# Patient Record
Sex: MALE | Race: WHITE | Employment: UNEMPLOYED | URBAN - METROPOLITAN AREA
[De-identification: names, ages, dates, MRNs, and addresses within clinical notes are randomized per-mention and may not be internally consistent; named-entity substitution may affect disease eponyms.]

---

## 2017-08-15 ENCOUNTER — OFFICE VISIT (OUTPATIENT)
Dept: SURGERY | Age: 37
End: 2017-08-15

## 2017-08-15 VITALS
WEIGHT: 238 LBS | SYSTOLIC BLOOD PRESSURE: 107 MMHG | BODY MASS INDEX: 32.23 KG/M2 | HEART RATE: 58 BPM | DIASTOLIC BLOOD PRESSURE: 71 MMHG | HEIGHT: 72 IN

## 2017-08-15 DIAGNOSIS — E89.0 POSTOPERATIVE HYPOTHYROIDISM: ICD-10-CM

## 2017-08-15 DIAGNOSIS — C73 THYROID CANCER (HCC): ICD-10-CM

## 2017-08-15 PROBLEM — E03.9 HYPOTHYROIDISM: Status: ACTIVE | Noted: 2017-08-15

## 2017-08-15 PROCEDURE — 99203 OFFICE O/P NEW LOW 30 MIN: CPT | Performed by: INTERNAL MEDICINE

## 2017-08-15 RX ORDER — CALCIUM CARBONATE 500(1250)
500 TABLET ORAL DAILY
COMMUNITY
End: 2018-01-29 | Stop reason: SDUPTHER

## 2017-08-15 RX ORDER — LEVOTHYROXINE SODIUM 0.2 MG/1
200 TABLET ORAL DAILY
COMMUNITY
End: 2017-08-15 | Stop reason: SDUPTHER

## 2017-08-15 RX ORDER — CHOLECALCIFEROL (VITAMIN D3) 125 MCG
CAPSULE ORAL
COMMUNITY
End: 2022-05-26 | Stop reason: ALTCHOICE

## 2017-08-15 RX ORDER — GABAPENTIN 300 MG/1
300 CAPSULE ORAL 3 TIMES DAILY
Status: ON HOLD | COMMUNITY
End: 2021-08-26 | Stop reason: HOSPADM

## 2017-08-15 RX ORDER — OXYCODONE HYDROCHLORIDE AND ACETAMINOPHEN 5; 325 MG/1; MG/1
1 TABLET ORAL EVERY 4 HOURS PRN
COMMUNITY
End: 2017-09-12 | Stop reason: ALTCHOICE

## 2017-08-15 RX ORDER — LEVOTHYROXINE SODIUM 0.2 MG/1
200 TABLET ORAL DAILY
Qty: 30 TABLET | Refills: 5 | Status: SHIPPED | OUTPATIENT
Start: 2017-08-15 | End: 2017-09-12 | Stop reason: SDUPTHER

## 2017-08-15 ASSESSMENT — ENCOUNTER SYMPTOMS: TROUBLE SWALLOWING: 0

## 2017-09-12 ENCOUNTER — OFFICE VISIT (OUTPATIENT)
Dept: SURGERY | Age: 37
End: 2017-09-12

## 2017-09-12 VITALS
WEIGHT: 230 LBS | SYSTOLIC BLOOD PRESSURE: 136 MMHG | BODY MASS INDEX: 31.15 KG/M2 | DIASTOLIC BLOOD PRESSURE: 82 MMHG | HEIGHT: 72 IN | HEART RATE: 74 BPM

## 2017-09-12 DIAGNOSIS — C73 PAPILLARY THYROID CARCINOMA (HCC): ICD-10-CM

## 2017-09-12 DIAGNOSIS — E89.0 POSTOPERATIVE HYPOTHYROIDISM: Primary | ICD-10-CM

## 2017-09-12 PROCEDURE — 99213 OFFICE O/P EST LOW 20 MIN: CPT | Performed by: INTERNAL MEDICINE

## 2017-09-12 RX ORDER — LEVOTHYROXINE SODIUM 0.2 MG/1
200 TABLET ORAL DAILY
Qty: 30 TABLET | Refills: 5 | Status: SHIPPED | OUTPATIENT
Start: 2017-09-12 | End: 2017-10-31 | Stop reason: ALTCHOICE

## 2017-09-16 LAB — THYROGLOBULIN BY LC-MS/MS, SERUM/PLASMA: <0.5 NG/ML (ref 1.3–31.8)

## 2017-09-28 ENCOUNTER — HOSPITAL ENCOUNTER (OUTPATIENT)
Dept: ULTRASOUND IMAGING | Age: 37
Discharge: HOME | End: 2017-09-28
Payer: COMMERCIAL

## 2017-09-28 DIAGNOSIS — C73 PAPILLARY THYROID CARCINOMA (HCC): ICD-10-CM

## 2017-09-28 PROCEDURE — 76536 US EXAM OF HEAD AND NECK: CPT

## 2017-10-03 ENCOUNTER — OFFICE VISIT (OUTPATIENT)
Dept: SURGERY | Age: 37
End: 2017-10-03

## 2017-10-03 VITALS
WEIGHT: 231 LBS | HEART RATE: 60 BPM | SYSTOLIC BLOOD PRESSURE: 130 MMHG | HEIGHT: 72 IN | DIASTOLIC BLOOD PRESSURE: 80 MMHG | BODY MASS INDEX: 31.29 KG/M2

## 2017-10-03 DIAGNOSIS — C73 PAPILLARY THYROID CARCINOMA (HCC): ICD-10-CM

## 2017-10-03 DIAGNOSIS — E89.0 POSTOPERATIVE HYPOTHYROIDISM: Primary | ICD-10-CM

## 2017-10-03 PROCEDURE — 99213 OFFICE O/P EST LOW 20 MIN: CPT | Performed by: INTERNAL MEDICINE

## 2017-10-03 NOTE — PROGRESS NOTES
Subjective:      Patient ID: Byron Rondon is a 42 y.o. male. Other   This is a chronic (hypothyroidism/thyroid cancer) problem. The problem occurs intermittently. The problem has been waxing and waning. Associated symptoms include fatigue. Exacerbated by: THYROIDECTOMY. Treatments tried: Synthroid 200 µg daily. The treatment provided moderate relief. Patient Active Problem List   Diagnosis    Thyroid cancer ([facility] [address]    Hypothyroidism         Past Surgical History:   Procedure Laterality Date    TOTAL THYROIDECTOMY Bilateral 2017         No Known Allergies      Current Outpatient Prescriptions:     levothyroxine (SYNTHROID) 200 MCG tablet, Take 1 tablet by mouth Daily, Disp: 30 tablet, Rfl: 5    gabapentin (NEURONTIN) 300 MG capsule, Take 300 mg by mouth 3 times daily, Disp: , Rfl:     calcium carbonate (OSCAL) 500 MG TABS tablet, Take 500 mg by mouth daily, Disp: , Rfl:     Cholecalciferol (VITAMIN D3) 2000 units TABS, Take by mouth, Disp: , Rfl:       Review of Systems   Constitutional: Positive for fatigue. All other systems reviewed and are negative. Vitals:    10/03/17 1144   BP: 130/80   Site: Left Arm   Position: Sitting   Cuff Size: Large Adult   Pulse: 60   Weight: 231 lb (104.8 kg)   Height: 6' (1.829 m)       Objective:   Physical Exam   Constitutional: He appears well-developed and well-nourished. HENT:   Head: Normocephalic and atraumatic. Eyes: Conjunctivae are normal.   Neck: Neck supple. Cardiovascular: Normal rate. Abdominal:   Obese    Neurological: He is alert. Skin: Skin is warm. EXAMINATION: US HEAD NECK SOFT TISSUE THYROID       CLINICAL HISTORY: 68-year-old with history of papillary thyroid carcinoma status post total thyroidectomy and lymph node resection.       COMPARISONS: No previous thyroid studies at this institution       FINDINGS: Thyroid ultrasonography was performed. No visualized thyroid tissue is demonstrated by ultrasound in the thyroid bed.  There are however bilateral nonspecific lymph nodes. There are 3 small lymph nodes in the right supraclavicular region- the    largest measuring 8 mm. Additionally there is a 1.4 x 0.6 cm lymph node at the right angle of the mandible and three lymph nodes just posterior to the angle of the mandible- the largest measuring 2 x 0.5 x 1.9 cm. There is a small less than 1 cm lymph    node just lateral to the mid internal jugular vein. Additionally there are three lymph nodes posterior to the angle of the mandible on the left the largest measuring 2 x 1.5 x 0.5 cm.           Impression   NO ULTRASOUND SIGNS TO CONFIRM THYROID TISSUE IN THE THYROID BED. NONSPECIFIC BILATERAL NECK LYMPH NODES AS DETAILED ABOVE. Results for Geno Turner (MRN [id number]) as of 10/11/2017 07:27   Ref. Range 9/12/2017 12:28   Thyroglobulin by LC-MS/MS, Serum/Plasma Latest Ref Range: 1.3 - 31.8 ng/mL <0.5 (L)     Assessment:      1. Postoperative hypothyroidism     2. Papillary thyroid carcinoma ([facility] [address]             Plan:       Will schedule pt to have neck and whole body scan with iodine 131   F/u in 4 weeks   Pt to hold of synthroid till scan is completed

## 2017-10-03 NOTE — MR AVS SNAPSHOT
After Visit Summary             Byron Rondon   10/3/2017 11:45 AM   Office Visit    Description:  Male : 1980   Provider:  Mary Carmen Jenkins MD   Department:   [address] and Future Appointments         Below is a list of your follow-up and future appointments. This may not be a complete list as you may have made appointments directly with providers that we are not aware of or your providers may have made some for you. Please call your providers to confirm appointments. It is important to keep your appointments. Please bring your current insurance card, photo ID, co-pay, and all medication bottles to your appointment. If self-pay, payment is expected at the time of service. Your To-Do List     Future Appointments Provider Department Dept Phone    10/31/2017 11:45 AM Mary Carmen Jenkins MD [address] [place] [address] [phone]    Please arrive 15 minutes prior to appointment, bring photo ID and insurance card. Please arrive 15 minutes prior to your appointment. Information from Your Visit        Department     Name Address Phone Fax    [facility] [address] [place] [phone]      Vital Signs     Blood Pressure Pulse Height Weight Body Mass Index Smoking Status    130/80 (Site: Left Arm, Position: Sitting, Cuff Size: Large Adult) 60 6' (1.829 m) 231 lb (104.8 kg) 31.33 kg/m2 Current Every Day Smoker      Additional Information about your Body Mass Index (BMI)           Your BMI as listed above is considered obese (30 or more). BMI is an estimate of body fat, calculated from your height and weight. The higher your BMI, the greater your risk of heart disease, high blood pressure, type 2 diabetes, stroke, gallstones, arthritis, sleep apnea, and certain cancers. BMI is not perfect.   It may overestimate body fat in athletes and people who are more muscular. Even a small weight loss (between 5 and 10 percent of your current weight) by decreasing your calorie intake and becoming more physically active will help lower your risk of developing or worsening diseases associated with obesity. Learn more at: [employer]             Medications and Orders      Your Current Medications Are              levothyroxine (SYNTHROID) 200 MCG tablet Take 1 tablet by mouth Daily    gabapentin (NEURONTIN) 300 MG capsule Take 300 mg by mouth 3 times daily    calcium carbonate (OSCAL) 500 MG TABS tablet Take 500 mg by mouth daily    Cholecalciferol (VITAMIN D3) 2000 units TABS Take by mouth      Allergies           No Known Allergies         Additional Information        Basic Information     Date Of Birth Sex Race Ethnicity Preferred Language    1980 Male White Non-/Non  English      Problem List as of 10/3/2017  Date Reviewed: 8/15/2017                Thyroid cancer [facility])    Hypothyroidism      Preventive Care        Date Due    HIV screening is recommended for all people regardless of risk factors  aged 15-65 years at least once (lifetime) who have never been HIV tested. 6/13/1993    Tetanus Combination Vaccine (1 - Tdap) 6/13/1997    Pneumococcal Vaccine - Pneumovax for adults aged 19-64 years with: chronic heart disease, chronic lung disease, diabetes mellitus, alcoholism, chronic liver disease, or cigarette smoking. (1 of 1 - PPSV23) 6/13/1997    Yearly Flu Vaccine (1) 9/1/2017            MyChart Signup           [employer] allows you to send messages to your doctor, view your test results, renew your prescriptions, schedule appointments, view visit notes, and more. How Do I Sign Up? 1. In your Internet browser, go to [email or web address]  2. Click on the Sign Up Now link in the Sign In box. You will see the New Member Sign Up page. 3. Enter your [employer] Access Code exactly as it appears below. You will not need to use this code after youve completed the sign-up process. If you do not sign up before the expiration date, you must request a new code. [employer] Access Code: [email or web address]  Expires: 10/14/2017 11:10 AM    4. Enter your Social Security Number ([id number]) and Date of Birth (mm/dd/yyyy) as indicated and click Submit. You will be taken to the next sign-up page. 5. Create a [employer] ID. This will be your [employer] login ID and cannot be changed, so think of one that is secure and easy to remember. 6. Create a [employer] password. You can change your password at any time. 7. Enter your Password Reset Question and Answer. This can be used at a later time if you forget your password. 8. Enter your e-mail address. You will receive e-mail notification when new information is available in [address] 9. Click Sign Up. You can now view your medical record. Additional Information  If you have questions, please contact the physician practice where you receive care. Remember, [employer] is NOT to be used for urgent needs. For medical emergencies, dial 911. For questions regarding your [employer] account call [phone]. If you have a clinical question, please call your doctor's office.

## 2017-10-30 ENCOUNTER — HOSPITAL ENCOUNTER (OUTPATIENT)
Dept: NUCLEAR MEDICINE | Age: 37
Discharge: HOME | End: 2017-10-30
Payer: COMMERCIAL

## 2017-10-30 DIAGNOSIS — C73 PRIMARY THYROID PAPILLARY CARCINOMA (HCC): ICD-10-CM

## 2017-10-30 PROCEDURE — 78014 THYROID IMAGING W/BLOOD FLOW: CPT

## 2017-10-30 PROCEDURE — 3430000000 HC RX DIAGNOSTIC RADIOPHARMACEUTICAL: Performed by: INTERNAL MEDICINE

## 2017-10-30 PROCEDURE — A9516 IODINE I-123 SOD IODIDE MIC: HCPCS | Performed by: INTERNAL MEDICINE

## 2017-10-30 RX ADMIN — Medication 262 MICRO CURIE: at 08:05

## 2017-10-31 ENCOUNTER — HOSPITAL ENCOUNTER (OUTPATIENT)
Dept: NUCLEAR MEDICINE | Age: 37
Discharge: HOME | End: 2017-10-31
Payer: COMMERCIAL

## 2017-10-31 ENCOUNTER — OFFICE VISIT (OUTPATIENT)
Dept: SURGERY | Age: 37
End: 2017-10-31

## 2017-10-31 VITALS
BODY MASS INDEX: 31.59 KG/M2 | HEIGHT: 72 IN | WEIGHT: 233.2 LBS | DIASTOLIC BLOOD PRESSURE: 85 MMHG | SYSTOLIC BLOOD PRESSURE: 124 MMHG | HEART RATE: 63 BPM

## 2017-10-31 DIAGNOSIS — E89.0 POSTOPERATIVE HYPOTHYROIDISM: Primary | ICD-10-CM

## 2017-10-31 DIAGNOSIS — C73 PRIMARY THYROID PAPILLARY CARCINOMA (HCC): ICD-10-CM

## 2017-10-31 DIAGNOSIS — C73 THYROID CANCER (HCC): ICD-10-CM

## 2017-10-31 PROCEDURE — G8417 CALC BMI ABV UP PARAM F/U: HCPCS | Performed by: INTERNAL MEDICINE

## 2017-10-31 PROCEDURE — 99213 OFFICE O/P EST LOW 20 MIN: CPT | Performed by: INTERNAL MEDICINE

## 2017-10-31 PROCEDURE — 4004F PT TOBACCO SCREEN RCVD TLK: CPT | Performed by: INTERNAL MEDICINE

## 2017-10-31 PROCEDURE — G8427 DOCREV CUR MEDS BY ELIG CLIN: HCPCS | Performed by: INTERNAL MEDICINE

## 2017-10-31 PROCEDURE — 3430000000 HC RX DIAGNOSTIC RADIOPHARMACEUTICAL: Performed by: INTERNAL MEDICINE

## 2017-10-31 PROCEDURE — G8484 FLU IMMUNIZE NO ADMIN: HCPCS | Performed by: INTERNAL MEDICINE

## 2017-10-31 PROCEDURE — A9517 I131 IODIDE CAP, RX: HCPCS | Performed by: INTERNAL MEDICINE

## 2017-10-31 RX ADMIN — Medication 4.6 MILLICURIE: at 14:40

## 2017-11-02 ENCOUNTER — HOSPITAL ENCOUNTER (OUTPATIENT)
Dept: NUCLEAR MEDICINE | Age: 37
Discharge: HOME | End: 2017-11-02
Payer: COMMERCIAL

## 2017-11-02 PROCEDURE — 78018 THYROID MET IMAGING BODY: CPT

## 2017-11-03 DIAGNOSIS — E89.0 POSTOPERATIVE HYPOTHYROIDISM: ICD-10-CM

## 2017-11-03 LAB
T4 FREE: 0.12 NG/DL (ref 0.93–1.7)
TSH SERPL DL<=0.05 MIU/L-ACNC: 122.3 UIU/ML (ref 0.27–4.2)

## 2017-11-04 LAB — THYROGLOBULIN AB: <0.9 IU/ML (ref 0–4)

## 2017-11-06 LAB — THYROGLOBULIN BY LC-MS/MS, SERUM/PLASMA: 1.6 NG/ML (ref 1.3–31.8)

## 2017-11-10 ENCOUNTER — HOSPITAL ENCOUNTER (OUTPATIENT)
Dept: NUCLEAR MEDICINE | Age: 37
Discharge: HOME | End: 2017-11-10
Payer: COMMERCIAL

## 2017-11-10 DIAGNOSIS — C80.1 PAPILLARY ADENOCARCINOMA (HCC): ICD-10-CM

## 2017-11-10 PROCEDURE — 79005 NUCLEAR RX ORAL ADMIN: CPT

## 2017-11-10 PROCEDURE — A9517 I131 IODIDE CAP, RX: HCPCS | Performed by: INTERNAL MEDICINE

## 2017-11-10 PROCEDURE — 3430000000 HC RX DIAGNOSTIC RADIOPHARMACEUTICAL: Performed by: INTERNAL MEDICINE

## 2017-11-10 RX ADMIN — Medication 96.4 MILLICURIE: at 11:30

## 2017-11-28 ENCOUNTER — OFFICE VISIT (OUTPATIENT)
Dept: SURGERY | Age: 37
End: 2017-11-28

## 2017-11-28 VITALS
BODY MASS INDEX: 33.05 KG/M2 | DIASTOLIC BLOOD PRESSURE: 87 MMHG | HEART RATE: 69 BPM | HEIGHT: 72 IN | SYSTOLIC BLOOD PRESSURE: 135 MMHG | WEIGHT: 244 LBS

## 2017-11-28 DIAGNOSIS — E89.0 POSTOPERATIVE HYPOTHYROIDISM: Primary | ICD-10-CM

## 2017-11-28 DIAGNOSIS — C73 PAPILLARY THYROID CARCINOMA (HCC): ICD-10-CM

## 2017-11-28 PROCEDURE — G8484 FLU IMMUNIZE NO ADMIN: HCPCS | Performed by: INTERNAL MEDICINE

## 2017-11-28 PROCEDURE — 99213 OFFICE O/P EST LOW 20 MIN: CPT | Performed by: INTERNAL MEDICINE

## 2017-11-28 PROCEDURE — 4004F PT TOBACCO SCREEN RCVD TLK: CPT | Performed by: INTERNAL MEDICINE

## 2017-11-28 PROCEDURE — G8417 CALC BMI ABV UP PARAM F/U: HCPCS | Performed by: INTERNAL MEDICINE

## 2017-11-28 PROCEDURE — G8427 DOCREV CUR MEDS BY ELIG CLIN: HCPCS | Performed by: INTERNAL MEDICINE

## 2017-11-28 RX ORDER — LEVOTHYROXINE SODIUM 0.2 MG/1
200 TABLET ORAL DAILY
Qty: 30 TABLET | Refills: 3 | Status: SHIPPED | OUTPATIENT
Start: 2017-11-28 | End: 2018-06-29 | Stop reason: SDUPTHER

## 2017-11-28 NOTE — PROGRESS NOTES
Right Arm   Position: Sitting   Cuff Size: Large Adult   Pulse: 69   Weight: 244 lb (110.7 kg)   Height: 6' (1.829 m)       Objective:   Physical Exam   Constitutional: He appears well-developed and well-nourished. HENT:   Head: Normocephalic and atraumatic. Neck: Neck supple. No thyromegaly present. Cardiovascular: Normal rate. Pulmonary/Chest: Effort normal.   Abdominal:   Obese    Musculoskeletal: Normal range of motion. Neurological: He is alert. Skin: Skin is warm. Psychiatric: His mood appears anxious. Results for Aranza Amato (MRN [id number]) as of 11/28/2017 14:20   Ref. Range 11/3/2017 07:35 11/3/2017 08:35   TSH Latest Ref Range: 0.270 - 4.200 uIU/mL  122.300 (H)   T4 Free Latest Ref Range: 0.93 - 1.70 ng/dL  0.12 (L)   Thyroglobulin Ab Latest Ref Range: 0.0 - 4.0 IU/mL  <0.9   Thyroglobulin by LC-MS/MS, Serum/Plasma Latest Ref Range: 1.3 - 31.8 ng/mL 1.6      Assessment:      1. Postoperative hypothyroidism  T4, Free    TSH without Reflex    Thyroglobulin    Anti-Thyroglobulin Antibody   2.  Papillary thyroid carcinoma ([facility] [address]             Plan:      Orders Placed This Encounter   Procedures    T4, Free     Standing Status:   Future     Standing Expiration Date:   11/28/2018    TSH without Reflex     Standing Status:   Future     Standing Expiration Date:   11/28/2018    Thyroglobulin     Standing Status:   Future     Standing Expiration Date:   11/28/2018    Anti-Thyroglobulin Antibody     Standing Status:   Future     Standing Expiration Date:   11/28/2018     Patient to resume his Synthroid 20 µg daily  Labs in 6 weeks

## 2018-01-17 DIAGNOSIS — E89.0 POSTOPERATIVE HYPOTHYROIDISM: ICD-10-CM

## 2018-01-17 LAB
T4 FREE: 2.29 NG/DL (ref 0.93–1.7)
TSH SERPL DL<=0.05 MIU/L-ACNC: 0.52 UIU/ML (ref 0.27–4.2)

## 2018-01-19 LAB — THYROGLOBULIN AB: <0.9 IU/ML (ref 0–4)

## 2018-01-21 LAB — THYROGLOBULIN BY LC-MS/MS, SERUM/PLASMA: <0.5 NG/ML (ref 1.3–31.8)

## 2018-01-29 ENCOUNTER — OFFICE VISIT (OUTPATIENT)
Dept: ENDOCRINOLOGY | Age: 38
End: 2018-01-29
Payer: COMMERCIAL

## 2018-01-29 VITALS
DIASTOLIC BLOOD PRESSURE: 75 MMHG | SYSTOLIC BLOOD PRESSURE: 116 MMHG | HEIGHT: 72 IN | HEART RATE: 67 BPM | WEIGHT: 227 LBS | BODY MASS INDEX: 30.75 KG/M2

## 2018-01-29 DIAGNOSIS — E89.0 POSTOPERATIVE HYPOTHYROIDISM: Primary | ICD-10-CM

## 2018-01-29 DIAGNOSIS — C73 THYROID CANCER (HCC): ICD-10-CM

## 2018-01-29 PROCEDURE — G8417 CALC BMI ABV UP PARAM F/U: HCPCS | Performed by: INTERNAL MEDICINE

## 2018-01-29 PROCEDURE — 99213 OFFICE O/P EST LOW 20 MIN: CPT | Performed by: INTERNAL MEDICINE

## 2018-01-29 PROCEDURE — G8427 DOCREV CUR MEDS BY ELIG CLIN: HCPCS | Performed by: INTERNAL MEDICINE

## 2018-01-29 PROCEDURE — G8484 FLU IMMUNIZE NO ADMIN: HCPCS | Performed by: INTERNAL MEDICINE

## 2018-01-29 PROCEDURE — 4004F PT TOBACCO SCREEN RCVD TLK: CPT | Performed by: INTERNAL MEDICINE

## 2018-01-29 RX ORDER — CALCIUM CARBONATE 500(1250)
500 TABLET ORAL DAILY
Qty: 30 TABLET | Refills: 3 | Status: SHIPPED | OUTPATIENT
Start: 2018-01-29 | End: 2021-10-14

## 2018-04-20 DIAGNOSIS — E89.0 POSTOPERATIVE HYPOTHYROIDISM: ICD-10-CM

## 2018-04-20 LAB
ANION GAP SERPL CALCULATED.3IONS-SCNC: 13 MEQ/L (ref 7–13)
BUN BLDV-MCNC: 14 MG/DL (ref 6–20)
CALCIUM SERPL-MCNC: 9.2 MG/DL (ref 8.6–10.2)
CHLORIDE BLD-SCNC: 98 MEQ/L (ref 98–107)
CO2: 26 MEQ/L (ref 22–29)
CREAT SERPL-MCNC: 0.84 MG/DL (ref 0.7–1.2)
GFR AFRICAN AMERICAN: >60
GFR NON-AFRICAN AMERICAN: >60
GLUCOSE BLD-MCNC: 84 MG/DL (ref 74–109)
POTASSIUM SERPL-SCNC: 4.6 MEQ/L (ref 3.5–5.1)
SODIUM BLD-SCNC: 137 MEQ/L (ref 132–144)
T4 FREE: 2.12 NG/DL (ref 0.93–1.7)
TSH SERPL DL<=0.05 MIU/L-ACNC: 4.15 UIU/ML (ref 0.27–4.2)

## 2018-04-22 LAB — THYROGLOBULIN AB: <0.9 IU/ML (ref 0–4)

## 2018-04-25 LAB — THYROGLOBULIN BY LC-MS/MS, SERUM/PLASMA: 2.4 NG/ML (ref 1.3–31.8)

## 2018-04-30 ENCOUNTER — OFFICE VISIT (OUTPATIENT)
Dept: ENDOCRINOLOGY | Age: 38
End: 2018-04-30
Payer: COMMERCIAL

## 2018-04-30 VITALS
WEIGHT: 248 LBS | DIASTOLIC BLOOD PRESSURE: 88 MMHG | BODY MASS INDEX: 33.59 KG/M2 | SYSTOLIC BLOOD PRESSURE: 134 MMHG | HEIGHT: 72 IN | HEART RATE: 58 BPM

## 2018-04-30 DIAGNOSIS — E89.0 POSTOPERATIVE HYPOTHYROIDISM: Primary | ICD-10-CM

## 2018-04-30 DIAGNOSIS — C73 PAPILLARY THYROID CARCINOMA (HCC): ICD-10-CM

## 2018-04-30 PROCEDURE — 99213 OFFICE O/P EST LOW 20 MIN: CPT | Performed by: INTERNAL MEDICINE

## 2018-04-30 PROCEDURE — 4004F PT TOBACCO SCREEN RCVD TLK: CPT | Performed by: INTERNAL MEDICINE

## 2018-04-30 PROCEDURE — G8417 CALC BMI ABV UP PARAM F/U: HCPCS | Performed by: INTERNAL MEDICINE

## 2018-04-30 PROCEDURE — G8427 DOCREV CUR MEDS BY ELIG CLIN: HCPCS | Performed by: INTERNAL MEDICINE

## 2018-05-17 DIAGNOSIS — C73 PAPILLARY THYROID CARCINOMA (HCC): ICD-10-CM

## 2018-05-17 LAB — TSH SERPL DL<=0.05 MIU/L-ACNC: 89.21 UIU/ML (ref 0.27–4.2)

## 2018-05-23 ENCOUNTER — TELEPHONE (OUTPATIENT)
Dept: ENDOCRINOLOGY | Age: 38
End: 2018-05-23

## 2018-06-04 ENCOUNTER — HOSPITAL ENCOUNTER (OUTPATIENT)
Dept: NUCLEAR MEDICINE | Age: 38
Discharge: HOME | End: 2018-06-06
Payer: COMMERCIAL

## 2018-06-04 DIAGNOSIS — C73 THYROID CANCER (HCC): ICD-10-CM

## 2018-06-04 PROCEDURE — A9516 IODINE I-123 SOD IODIDE MIC: HCPCS | Performed by: INTERNAL MEDICINE

## 2018-06-04 PROCEDURE — 3430000000 HC RX DIAGNOSTIC RADIOPHARMACEUTICAL: Performed by: INTERNAL MEDICINE

## 2018-06-04 PROCEDURE — 78014 THYROID IMAGING W/BLOOD FLOW: CPT

## 2018-06-04 RX ADMIN — Medication 272 MICRO CURIE: at 08:50

## 2018-06-05 ENCOUNTER — HOSPITAL ENCOUNTER (OUTPATIENT)
Dept: NUCLEAR MEDICINE | Age: 38
Discharge: HOME | End: 2018-06-07
Payer: COMMERCIAL

## 2018-06-05 DIAGNOSIS — C73 THYROID CANCER (HCC): ICD-10-CM

## 2018-06-05 PROCEDURE — A9517 I131 IODIDE CAP, RX: HCPCS | Performed by: INTERNAL MEDICINE

## 2018-06-05 PROCEDURE — 78018 THYROID MET IMAGING BODY: CPT

## 2018-06-05 PROCEDURE — 3430000000 HC RX DIAGNOSTIC RADIOPHARMACEUTICAL: Performed by: INTERNAL MEDICINE

## 2018-06-05 RX ADMIN — Medication 4.7 MILLICURIE: at 15:00

## 2018-06-29 ENCOUNTER — OFFICE VISIT (OUTPATIENT)
Dept: ENDOCRINOLOGY | Age: 38
End: 2018-06-29
Payer: COMMERCIAL

## 2018-06-29 VITALS
HEART RATE: 66 BPM | DIASTOLIC BLOOD PRESSURE: 82 MMHG | SYSTOLIC BLOOD PRESSURE: 126 MMHG | WEIGHT: 257 LBS | BODY MASS INDEX: 34.81 KG/M2 | HEIGHT: 72 IN

## 2018-06-29 DIAGNOSIS — E89.0 POSTOPERATIVE HYPOTHYROIDISM: Primary | ICD-10-CM

## 2018-06-29 DIAGNOSIS — C73 PAPILLARY THYROID CARCINOMA (HCC): ICD-10-CM

## 2018-06-29 PROCEDURE — G8417 CALC BMI ABV UP PARAM F/U: HCPCS | Performed by: INTERNAL MEDICINE

## 2018-06-29 PROCEDURE — 4004F PT TOBACCO SCREEN RCVD TLK: CPT | Performed by: INTERNAL MEDICINE

## 2018-06-29 PROCEDURE — 99213 OFFICE O/P EST LOW 20 MIN: CPT | Performed by: INTERNAL MEDICINE

## 2018-06-29 PROCEDURE — G8427 DOCREV CUR MEDS BY ELIG CLIN: HCPCS | Performed by: INTERNAL MEDICINE

## 2018-06-29 RX ORDER — LEVOTHYROXINE SODIUM 0.2 MG/1
200 TABLET ORAL DAILY
Qty: 30 TABLET | Refills: 3 | Status: SHIPPED | OUTPATIENT
Start: 2018-06-29 | End: 2018-12-14 | Stop reason: SDUPTHER

## 2018-07-02 NOTE — PROGRESS NOTES
Subjective:      Patient ID: Mick Yun is a 34 y.o. male. Other   This is a chronic (hypothyroidism/thyroid cancer) problem. The problem occurs intermittently. The problem has been waxing and waning. Associated symptoms include fatigue and myalgias. Exacerbated by: THYROIDECTOMY. Treatments tried: Synthroid 200 µg daily. The treatment provided moderate relief. Pt c/o fatigue myalgia  off synthroid     Patient is status post ablation with radioactive iodine  November 2017      Reviewed thyroid and whole body scan     EXAMINATION: NUCLEAR MEDICINE THYROID CANCER -- TOTAL BODY SCAN       CLINICAL HISTORY: Thyroid cancer with thyroidectomy 2/17.       COMPARISONS: None available.       TECHNIQUE: 4.7 mCi of sodium I-131 was ingested in a capsule with water.  Delayed image were acquired of the whole body and to 72 hours after the ingestion of the radionuclide.       FINDINGS: Radionuclide uptake is seen within salivary glands, stomach, bladder and colon, normal sites of distribution.  On the 72 hour images radionuclide is still present within the transverse colon and more distally. No tracer seen within the kidneys.    There is minimal tracer within the bladder. Also tracer within the salivary glands These are normal sized excretion. There is no significant uptake is seen within the neck.  No other sites of abnormal uptake.           Impression   NO DEFINITE EVIDENCE OF METASTATIC DISEASE. NM THYROID UPTAKE AND SCAN : 6/4/2018       CLINICAL HISTORY: C73 Thyroid cancer ([facility] [address] ICD10.       COMPARISON: Whole body iodine-131 scan 11/2/2017       TECHNIQUE: 24 hours after the oral administration of 272 uCi of I-123 by capsule, planar images of the neck and thyroid uptake was calculated. .           FINDINGS:        There is no significant focal radiotracer accumulation identified within the neck.       A thyroid uptake value of 0.3% is calculated, which appears similar to background activity.                 Impression       NO SIGNIFICANT FOCAL RADIOTRACER ACCUMULATION IDENTIFIED WITHIN THE NECK. Patient Active Problem List   Diagnosis    Thyroid cancer ([facility] [address]    Hypothyroidism    Papillary thyroid carcinoma ([facility] [address]         Past Surgical History:   Procedure Laterality Date    TOTAL THYROIDECTOMY Bilateral 2017         No Known Allergies      Current Outpatient Prescriptions:     levothyroxine (SYNTHROID) 200 MCG tablet, Take 1 tablet by mouth Daily, Disp: 30 tablet, Rfl: 3    calcium carbonate (OSCAL) 500 MG TABS tablet, Take 1 tablet by mouth daily, Disp: 30 tablet, Rfl: 3    gabapentin (NEURONTIN) 300 MG capsule, Take 300 mg by mouth 3 times daily, Disp: , Rfl:     Cholecalciferol (VITAMIN D3) 2000 units TABS, Take by mouth, Disp: , Rfl:       Review of Systems   Constitutional: Positive for fatigue. Endocrine: Positive for cold intolerance. Musculoskeletal: Positive for myalgias. All other systems reviewed and are negative. Vitals:    06/29/18 1351   BP: 126/82   Site: Left Arm   Position: Sitting   Cuff Size: Large Adult   Pulse: 66   Weight: 257 lb (116.6 kg)   Height: 6' (1.829 m)       Objective:   Physical Exam   Constitutional: He appears well-developed and well-nourished. HENT:   Head: Normocephalic and atraumatic. Neck: Neck supple. Cardiovascular: Normal rate. Abdominal:   Obese    Neurological: He is alert. Skin: Skin is warm. Psychiatric: His mood appears anxious. Assessment:       Diagnosis Orders   1. Postoperative hypothyroidism  T4, Free    TSH without Reflex    Thyroglobulin    Anti-Thyroglobulin Antibody   2.  Papillary thyroid carcinoma ([facility] [address]             Plan:      Orders Placed This Encounter   Procedures    T4, Free     Standing Status:   Future     Standing Expiration Date:   6/29/2019    TSH without Reflex     Standing Status:   Future     Standing Expiration Date:   6/29/2019    Thyroglobulin     Standing Status:   Future     Standing

## 2018-08-17 DIAGNOSIS — E89.0 POSTOPERATIVE HYPOTHYROIDISM: ICD-10-CM

## 2018-08-17 LAB
ANION GAP SERPL CALCULATED.3IONS-SCNC: 14 MEQ/L (ref 7–13)
BUN BLDV-MCNC: 16 MG/DL (ref 6–20)
CALCIUM SERPL-MCNC: 9.3 MG/DL (ref 8.6–10.2)
CHLORIDE BLD-SCNC: 100 MEQ/L (ref 98–107)
CO2: 26 MEQ/L (ref 22–29)
CREAT SERPL-MCNC: 0.87 MG/DL (ref 0.7–1.2)
GFR AFRICAN AMERICAN: >60
GFR NON-AFRICAN AMERICAN: >60
GLUCOSE BLD-MCNC: 89 MG/DL (ref 74–109)
POTASSIUM SERPL-SCNC: 3.8 MEQ/L (ref 3.5–5.1)
SODIUM BLD-SCNC: 140 MEQ/L (ref 132–144)
T4 FREE: 2.11 NG/DL (ref 0.93–1.7)
TSH SERPL DL<=0.05 MIU/L-ACNC: 1.19 UIU/ML (ref 0.27–4.2)

## 2018-08-27 LAB — THYROGLOBULIN BY LC-MS/MS, SERUM/PLASMA: <0.5 NG/ML (ref 1.3–31.8)

## 2018-09-04 ENCOUNTER — OFFICE VISIT (OUTPATIENT)
Dept: ENDOCRINOLOGY | Age: 38
End: 2018-09-04
Payer: COMMERCIAL

## 2018-09-04 VITALS
WEIGHT: 246 LBS | BODY MASS INDEX: 33.32 KG/M2 | HEART RATE: 70 BPM | HEIGHT: 72 IN | DIASTOLIC BLOOD PRESSURE: 78 MMHG | SYSTOLIC BLOOD PRESSURE: 118 MMHG

## 2018-09-04 DIAGNOSIS — C73 PAPILLARY THYROID CARCINOMA (HCC): ICD-10-CM

## 2018-09-04 DIAGNOSIS — E89.0 POSTOPERATIVE HYPOTHYROIDISM: Primary | ICD-10-CM

## 2018-09-04 PROCEDURE — G8427 DOCREV CUR MEDS BY ELIG CLIN: HCPCS | Performed by: INTERNAL MEDICINE

## 2018-09-04 PROCEDURE — G8417 CALC BMI ABV UP PARAM F/U: HCPCS | Performed by: INTERNAL MEDICINE

## 2018-09-04 PROCEDURE — 4004F PT TOBACCO SCREEN RCVD TLK: CPT | Performed by: INTERNAL MEDICINE

## 2018-09-04 PROCEDURE — 99213 OFFICE O/P EST LOW 20 MIN: CPT | Performed by: INTERNAL MEDICINE

## 2018-09-07 NOTE — PROGRESS NOTES
Subjective:      Patient ID: Jose Soto is a 34 y.o. male. 2 month f/u     Other   This is a chronic (hypothyroidism) problem. The current episode started more than 1 year ago. The problem has been gradually improving. Pertinent negatives include no fatigue. Exacerbated by: thyroidectomy  Treatments tried: synthyroid. The treatment provided moderate relief. Reviewed labs  Results for Ryland Wilson (MRN [id number]) as of 9/7/2018 06:46   Ref. Range 8/17/2018 11:23   Sodium Latest Ref Range: 132 - 144 mEq/L 140   Potassium Latest Ref Range: 3.5 - 5.1 mEq/L 3.8   Chloride Latest Ref Range: 98 - 107 mEq/L 100   CO2 Latest Ref Range: 22 - 29 mEq/L 26   BUN Latest Ref Range: 6 - 20 mg/dL 16   Creatinine Latest Ref Range: 0.70 - 1.20 mg/dL 0.87   Anion Gap Latest Ref Range: 7 - 13 mEq/L 14 (H)   GFR Non- Latest Ref Range: >60  >60.0   GFR African American Latest Ref Range: >60  >60.0   Glucose Latest Ref Range: 74 - 109 mg/dL 89   Calcium Latest Ref Range: 8.6 - 10.2 mg/dL 9.3   TSH Latest Ref Range: 0.270 - 4.200 uIU/mL 1.190   T4 Free Latest Ref Range: 0.93 - 1.70 ng/dL 2.11 (H)   Thyroglobulin by LC-MS/MS, Serum/Plasma Latest Ref Range: 1.3 - 31.8 ng/mL <0.5 (L)     Patient Active Problem List   Diagnosis    Thyroid cancer (HCC)    Hypothyroidism    Papillary thyroid carcinoma (H[facility])     No Known Allergies      Current Outpatient Prescriptions:     levothyroxine (SYNTHROID) 200 MCG tablet, Take 1 tablet by mouth Daily, Disp: 30 tablet, Rfl: 3    calcium carbonate (OSCAL) 500 MG TABS tablet, Take 1 tablet by mouth daily, Disp: 30 tablet, Rfl: 3    gabapentin (NEURONTIN) 300 MG capsule, Take 300 mg by mouth 3 times daily, Disp: , Rfl:     Cholecalciferol (VITAMIN D3) 2000 units TABS, Take by mouth, Disp: , Rfl:       Review of Systems   Constitutional: Negative for fatigue. Endocrine: Negative.       Vitals:    09/04/18 1620   BP: 118/78   Site: Right Arm   Position: Sitting   Cuff Size: Medium

## 2018-12-13 DIAGNOSIS — E89.0 POSTOPERATIVE HYPOTHYROIDISM: ICD-10-CM

## 2018-12-13 LAB
T4 FREE: 2 NG/DL (ref 0.93–1.7)
TSH SERPL DL<=0.05 MIU/L-ACNC: 2.9 UIU/ML (ref 0.27–4.2)

## 2018-12-14 RX ORDER — LEVOTHYROXINE SODIUM 0.2 MG/1
TABLET ORAL
Qty: 30 TABLET | Refills: 3 | Status: SHIPPED | OUTPATIENT
Start: 2018-12-14 | End: 2019-01-04 | Stop reason: SDUPTHER

## 2018-12-14 RX ORDER — LEVOTHYROXINE SODIUM 0.2 MG/1
200 TABLET ORAL DAILY
Qty: 30 TABLET | Refills: 3 | Status: SHIPPED | OUTPATIENT
Start: 2018-12-14 | End: 2019-01-04 | Stop reason: SDUPTHER

## 2019-01-04 ENCOUNTER — OFFICE VISIT (OUTPATIENT)
Dept: ENDOCRINOLOGY | Age: 39
End: 2019-01-04
Payer: COMMERCIAL

## 2019-01-04 VITALS
WEIGHT: 259 LBS | HEIGHT: 72 IN | SYSTOLIC BLOOD PRESSURE: 124 MMHG | BODY MASS INDEX: 35.08 KG/M2 | DIASTOLIC BLOOD PRESSURE: 76 MMHG | HEART RATE: 58 BPM

## 2019-01-04 DIAGNOSIS — E89.0 POSTOPERATIVE HYPOTHYROIDISM: Primary | ICD-10-CM

## 2019-01-04 DIAGNOSIS — E89.0 POSTOPERATIVE HYPOTHYROIDISM: ICD-10-CM

## 2019-01-04 LAB
T4 FREE: 1.68 NG/DL (ref 0.93–1.7)
TSH SERPL DL<=0.05 MIU/L-ACNC: 0.66 UIU/ML (ref 0.27–4.2)

## 2019-01-04 PROCEDURE — G8427 DOCREV CUR MEDS BY ELIG CLIN: HCPCS | Performed by: INTERNAL MEDICINE

## 2019-01-04 PROCEDURE — 4004F PT TOBACCO SCREEN RCVD TLK: CPT | Performed by: INTERNAL MEDICINE

## 2019-01-04 PROCEDURE — G8417 CALC BMI ABV UP PARAM F/U: HCPCS | Performed by: INTERNAL MEDICINE

## 2019-01-04 PROCEDURE — G8484 FLU IMMUNIZE NO ADMIN: HCPCS | Performed by: INTERNAL MEDICINE

## 2019-01-04 PROCEDURE — 99213 OFFICE O/P EST LOW 20 MIN: CPT | Performed by: INTERNAL MEDICINE

## 2019-01-04 RX ORDER — LEVOTHYROXINE SODIUM 0.2 MG/1
TABLET ORAL
Qty: 30 TABLET | Refills: 3 | Status: SHIPPED | OUTPATIENT
Start: 2019-01-04 | End: 2019-02-18 | Stop reason: SDUPTHER

## 2019-01-06 LAB — THYROGLOBULIN AB: <0.9 IU/ML (ref 0–4)

## 2019-01-09 LAB — THYROGLOBULIN BY LC-MS/MS, SERUM/PLASMA: <0.5 NG/ML (ref 1.3–31.8)

## 2019-02-07 RX ORDER — LEVOTHYROXINE SODIUM 0.1 MG/1
TABLET ORAL
Qty: 56 TABLET | Refills: 0 | COMMUNITY
Start: 2018-09-04 | End: 2019-02-18 | Stop reason: SDUPTHER

## 2019-02-18 ENCOUNTER — OFFICE VISIT (OUTPATIENT)
Dept: ENDOCRINOLOGY | Age: 39
End: 2019-02-18
Payer: COMMERCIAL

## 2019-02-18 VITALS
DIASTOLIC BLOOD PRESSURE: 84 MMHG | WEIGHT: 257 LBS | SYSTOLIC BLOOD PRESSURE: 120 MMHG | HEIGHT: 72 IN | BODY MASS INDEX: 34.81 KG/M2 | HEART RATE: 65 BPM

## 2019-02-18 DIAGNOSIS — E89.0 POSTOPERATIVE HYPOTHYROIDISM: Primary | ICD-10-CM

## 2019-02-18 PROCEDURE — G8484 FLU IMMUNIZE NO ADMIN: HCPCS | Performed by: INTERNAL MEDICINE

## 2019-02-18 PROCEDURE — 4004F PT TOBACCO SCREEN RCVD TLK: CPT | Performed by: INTERNAL MEDICINE

## 2019-02-18 PROCEDURE — 99213 OFFICE O/P EST LOW 20 MIN: CPT | Performed by: INTERNAL MEDICINE

## 2019-02-18 PROCEDURE — G8417 CALC BMI ABV UP PARAM F/U: HCPCS | Performed by: INTERNAL MEDICINE

## 2019-02-18 PROCEDURE — G8427 DOCREV CUR MEDS BY ELIG CLIN: HCPCS | Performed by: INTERNAL MEDICINE

## 2019-02-18 RX ORDER — LEVOTHYROXINE SODIUM 0.2 MG/1
TABLET ORAL
Qty: 30 TABLET | Refills: 3 | Status: SHIPPED | OUTPATIENT
Start: 2019-02-18 | End: 2019-04-18 | Stop reason: SDUPTHER

## 2019-04-18 RX ORDER — LEVOTHYROXINE SODIUM 0.2 MG/1
TABLET ORAL
Qty: 30 TABLET | Refills: 3 | Status: SHIPPED | OUTPATIENT
Start: 2019-04-18 | End: 2019-08-22 | Stop reason: SDUPTHER

## 2019-08-22 RX ORDER — LEVOTHYROXINE SODIUM 0.2 MG/1
TABLET ORAL
Qty: 30 TABLET | Refills: 3 | Status: SHIPPED | OUTPATIENT
Start: 2019-08-22 | End: 2019-09-06 | Stop reason: SDUPTHER

## 2019-08-26 DIAGNOSIS — E89.0 POSTOPERATIVE HYPOTHYROIDISM: ICD-10-CM

## 2019-08-26 LAB
T4 FREE: 2.17 NG/DL (ref 0.84–1.68)
TSH SERPL DL<=0.05 MIU/L-ACNC: 0.1 UIU/ML (ref 0.44–3.86)

## 2019-08-27 LAB — THYROGLOBULIN AB: <0.9 IU/ML (ref 0–4)

## 2019-09-03 LAB — THYROGLOBULIN BY LC-MS/MS, SERUM/PLASMA: <0.5 NG/ML (ref 1.3–31.8)

## 2019-09-06 ENCOUNTER — OFFICE VISIT (OUTPATIENT)
Dept: ENDOCRINOLOGY | Age: 39
End: 2019-09-06
Payer: COMMERCIAL

## 2019-09-06 VITALS
BODY MASS INDEX: 34 KG/M2 | SYSTOLIC BLOOD PRESSURE: 115 MMHG | WEIGHT: 251 LBS | DIASTOLIC BLOOD PRESSURE: 81 MMHG | HEART RATE: 63 BPM | HEIGHT: 72 IN

## 2019-09-06 DIAGNOSIS — C73 PAPILLARY THYROID CARCINOMA (HCC): ICD-10-CM

## 2019-09-06 DIAGNOSIS — E89.0 POSTOPERATIVE HYPOTHYROIDISM: Primary | ICD-10-CM

## 2019-09-06 PROCEDURE — 99213 OFFICE O/P EST LOW 20 MIN: CPT | Performed by: INTERNAL MEDICINE

## 2019-09-06 PROCEDURE — 4004F PT TOBACCO SCREEN RCVD TLK: CPT | Performed by: INTERNAL MEDICINE

## 2019-09-06 PROCEDURE — G8417 CALC BMI ABV UP PARAM F/U: HCPCS | Performed by: INTERNAL MEDICINE

## 2019-09-06 PROCEDURE — G8427 DOCREV CUR MEDS BY ELIG CLIN: HCPCS | Performed by: INTERNAL MEDICINE

## 2019-09-06 RX ORDER — ATORVASTATIN CALCIUM 80 MG/1
80 TABLET, FILM COATED ORAL DAILY
COMMUNITY
End: 2021-10-14

## 2019-09-06 RX ORDER — LEVOTHYROXINE SODIUM 175 UG/1
TABLET ORAL
Qty: 30 TABLET | Refills: 6 | Status: SHIPPED | OUTPATIENT
Start: 2019-09-06 | End: 2020-10-23

## 2020-03-11 ENCOUNTER — HOSPITAL ENCOUNTER (OUTPATIENT)
Age: 40
Discharge: HOME | End: 2020-03-11
Payer: MEDICARE

## 2020-03-11 LAB
THYROXINE, FREE: 1.8 NG/DL (ref 0.93–1.7)
TSH SERPL DL<=0.05 MIU/L-ACNC: 0.39 MIU/L (ref 0.3–5)

## 2020-03-11 PROCEDURE — 36415 COLL VENOUS BLD VENIPUNCTURE: CPT

## 2020-03-11 PROCEDURE — 84439 ASSAY OF FREE THYROXINE: CPT

## 2020-03-11 PROCEDURE — 84443 ASSAY THYROID STIM HORMONE: CPT

## 2020-03-11 PROCEDURE — 84432 ASSAY OF THYROGLOBULIN: CPT

## 2020-03-11 PROCEDURE — 86800 THYROGLOBULIN ANTIBODY: CPT

## 2020-03-12 LAB
THYROGLOBULIN AB: <20 IU/ML (ref 0–40)
THYROGLOBULIN: <0.2 NG/ML (ref 0.3–49.7)

## 2020-09-25 RX ORDER — LEVOTHYROXINE SODIUM 175 UG/1
TABLET ORAL
Qty: 30 TABLET | Refills: 4 | OUTPATIENT
Start: 2020-09-25

## 2020-10-01 RX ORDER — LEVOTHYROXINE SODIUM 175 UG/1
TABLET ORAL
Qty: 30 TABLET | Refills: 4 | OUTPATIENT
Start: 2020-10-01

## 2020-10-05 RX ORDER — LEVOTHYROXINE SODIUM 175 UG/1
TABLET ORAL
Qty: 30 TABLET | Refills: 4 | OUTPATIENT
Start: 2020-10-05

## 2020-10-23 RX ORDER — LEVOTHYROXINE SODIUM 175 UG/1
TABLET ORAL
Qty: 30 TABLET | Refills: 0 | Status: SHIPPED | OUTPATIENT
Start: 2020-10-23 | End: 2020-12-22 | Stop reason: SDUPTHER

## 2020-10-26 RX ORDER — LEVOTHYROXINE SODIUM 175 UG/1
TABLET ORAL
Qty: 30 TABLET | Refills: 4 | OUTPATIENT
Start: 2020-10-26

## 2020-11-24 RX ORDER — LEVOTHYROXINE SODIUM 175 UG/1
TABLET ORAL
Qty: 22 TABLET | Refills: 0 | OUTPATIENT
Start: 2020-11-24

## 2020-12-17 RX ORDER — LEVOTHYROXINE SODIUM 175 UG/1
TABLET ORAL
Qty: 22 TABLET | Refills: 0 | OUTPATIENT
Start: 2020-12-17

## 2020-12-22 ENCOUNTER — VIRTUAL VISIT (OUTPATIENT)
Dept: ENDOCRINOLOGY | Age: 40
End: 2020-12-22
Payer: MEDICARE

## 2020-12-22 PROCEDURE — 99213 OFFICE O/P EST LOW 20 MIN: CPT | Performed by: INTERNAL MEDICINE

## 2020-12-22 PROCEDURE — G8428 CUR MEDS NOT DOCUMENT: HCPCS | Performed by: INTERNAL MEDICINE

## 2020-12-22 RX ORDER — LEVOTHYROXINE SODIUM 175 UG/1
TABLET ORAL
Qty: 30 TABLET | Refills: 11 | Status: SHIPPED | OUTPATIENT
Start: 2020-12-22 | End: 2022-01-19 | Stop reason: SDUPTHER

## 2020-12-22 ASSESSMENT — ENCOUNTER SYMPTOMS: TROUBLE SWALLOWING: 0

## 2020-12-22 NOTE — PROGRESS NOTES
2020    TELEHEALTH EVALUATION -- Audio/Visual (During VEGXP-72 public health emergency)    Due to COVID 19 outbreak, patient's office visit was converted to a virtual visit. Patient was contacted and agreed to proceed with a virtual visit via [email or web address]  The risks and benefits of converting to a virtual visit were discussed in light of the current infectious disease epidemic. Patient also understood that insurance coverage and co-pays are up to their individual insurance plans. HPI: Video visit patient was at home I was at my office    Jaida Perez (:  1980) has requested an audio/video evaluation for the following concern(s):    Follow-up on hypothyroidism status post thyroidectomy for papillary thyroid carcinoma last labs were done in March at [facility] facility out of free T4 was slightly high TSH was normal to thyroglobulin level less than 0.2 patient denies any neck pain difficulty swallowing any lymph node enlargement currently on levothyroxine 175 mcg daily    Results for Kamilla Martinez (MRN [id number]) as of 2020 17:25   Ref. Range 2018 11:23 2018 10:46 2019 17:06 2019 08:22 3/11/2020 13:01   TSH Latest Ref Range: 0.30 - 5.00 mIU/L 1.190 2.900 0.663 0.097 (L) 0.39   Thyroxine, Free Latest Ref Range: 0.93 - 1.70 ng/dL     1.80 (H)   T4 Free Latest Ref Range: 0.84 - 1.68 ng/dL 2.11 (H) 2.00 (H) 1.68 2.17 (H)    Thyroglobulin Latest Ref Range: 0.3 - 49.7 ng/mL     <0.2 (L)   Thyroglobulin Ab Latest Ref Range: 0.0 - 40.0 IU/mL   <0.9 <0.9 <20.0   Thyroglobulin by LC-MS/MS, Serum/Plasma Latest Ref Range: 1.3 - 31.8 ng/mL <0.5 (L)  <0.5 (L) <0.5 (L)      Patient Active Problem List   Diagnosis    Thyroid cancer ([address]    Hypothyroidism    Papillary thyroid carcinoma (HCC)         Review of Systems   HENT: Negative for trouble swallowing. Endocrine: Negative. Musculoskeletal: Negative for neck pain. Hematological: Negative for adenopathy. All other systems reviewed and are negative. Prior to Visit Medications    Medication Sig Taking? Authorizing Provider   levothyroxine (SYNTHROID) 175 MCG tablet TAKE ONE TABLET BY MOUTH DAILY  Jordon Avina MD   apixaban (ELIQUIS) 5 MG TABS tablet Take by mouth 2 times daily  Historical Provider, MD   aspirin 81 MG tablet Take 81 mg by mouth daily  Historical Provider, MD   atorvastatin (LIPITOR) 80 MG tablet Take 80 mg by mouth daily  Historical Provider, MD   calcium carbonate (OSCAL) 500 MG TABS tablet Take 1 tablet by mouth daily  Nayeli Toure MD   gabapentin (NEURONTIN) 300 MG capsule Take 300 mg by mouth 3 times daily  Historical Provider, MD   Cholecalciferol (VITAMIN D3) 2000 units TABS Take by mouth  Historical Provider, MD       Social History     Tobacco Use    Smoking status: Current Every Day Smoker    Smokeless tobacco: Never Used    Tobacco comment: trying to cut back   Substance Use Topics    Alcohol use: Not on file    Drug use: Not on file            PHYSICAL EXAMINATION:  [ INSTRUCTIONS:  \"[x]\" Indicates a positive item  \"[]\" Indicates a negative item  -- DELETE ALL ITEMS NOT EXAMINED]  [] Alert  [] Oriented to person/place/time    [] No apparent distress  [] Toxic appearing    [] Face flushed appearing [] Sclera clear  [] Lips are cyanotic      [] Breathing appears normal  [] Appears tachypneic      [] Rash on visible skin    [] Cranial Nerves II-XII grossly intact    [] Motor grossly intact in visible upper extremities    [] Motor grossly intact in visible lower extremities    [] Normal Mood  [] Anxious appearing    [] Depressed appearing  [] Confused appearing      [] Poor short term memory  [] Poor long term memory    [] OTHER:      Due to this being a TeleHealth encounter, evaluation of the following organ systems is limited: Vitals/Constitutional/EENT/Resp/CV/GI//MS/Neuro/Skin/Heme-Lymph-Imm.     ASSESSMENT/PLAN:     Diagnosis Orders 1. Postoperative hypothyroidism  levothyroxine (SYNTHROID) 175 MCG tablet    T4, Free    TSH without Reflex    Anti-Thyroglobulin Antibody    Thyroglobulin     Orders Placed This Encounter   Procedures    T4, Free     Standing Status:   Future     Standing Expiration Date:   12/22/2021    TSH without Reflex     Standing Status:   Future     Standing Expiration Date:   12/22/2021    Anti-Thyroglobulin Antibody     Standing Status:   Future     Standing Expiration Date:   12/22/2021    Thyroglobulin     Standing Status:   Future     Standing Expiration Date:   12/22/2021     Orders Placed This Encounter   Medications    levothyroxine (SYNTHROID) 175 MCG tablet     Sig: Once a day     Dispense:  30 tablet     Refill:  11     Continue levothyroxine 175 mcg daily repeat labs with thyroglobulin free T4 TSH in 6 to 12 months time    An  electronic signature was used to authenticate this note. --Jonny Garcia MD on 12/22/2020 at 5:25 PM        Pursuant to the emergency declaration under the [facility] waiver authority and the [employer] Act, this Virtual  Visit was conducted, with patient's consent, to reduce the patient's risk of exposure to COVID-19 and provide continuity of care for an established patient. Services were provided through a video synchronous discussion virtually to substitute for in-person clinic visit.

## 2021-08-23 ENCOUNTER — APPOINTMENT (OUTPATIENT)
Dept: CT IMAGING | Age: 41
DRG: 300 | End: 2021-08-23
Attending: STUDENT IN AN ORGANIZED HEALTH CARE EDUCATION/TRAINING PROGRAM
Payer: MEDICARE

## 2021-08-23 ENCOUNTER — APPOINTMENT (OUTPATIENT)
Dept: GENERAL RADIOLOGY | Age: 41
DRG: 300 | End: 2021-08-23
Attending: STUDENT IN AN ORGANIZED HEALTH CARE EDUCATION/TRAINING PROGRAM
Payer: MEDICARE

## 2021-08-23 ENCOUNTER — HOSPITAL ENCOUNTER (INPATIENT)
Age: 41
LOS: 3 days | Discharge: HOME | DRG: 300 | End: 2021-08-26
Attending: STUDENT IN AN ORGANIZED HEALTH CARE EDUCATION/TRAINING PROGRAM | Admitting: STUDENT IN AN ORGANIZED HEALTH CARE EDUCATION/TRAINING PROGRAM
Payer: MEDICARE

## 2021-08-23 DIAGNOSIS — L03.032 CELLULITIS OF THIRD TOE OF LEFT FOOT: ICD-10-CM

## 2021-08-23 DIAGNOSIS — I74.9 ARTERIAL EMBOLISM (HCC): Primary | ICD-10-CM

## 2021-08-23 PROBLEM — Z86.718 HISTORY OF DEEP VEIN THROMBOSIS OF LOWER EXTREMITY: Status: ACTIVE | Noted: 2021-08-23

## 2021-08-23 PROBLEM — Z72.0 TOBACCO ABUSE: Status: ACTIVE | Noted: 2021-08-23

## 2021-08-23 PROBLEM — Z86.718 HISTORY OF DEEP VEIN THROMBOSIS OF LOWER EXTREMITY: Status: RESOLVED | Noted: 2021-08-23 | Resolved: 2021-08-23

## 2021-08-23 PROBLEM — R23.0 TOE CYANOSIS: Status: ACTIVE | Noted: 2021-08-23

## 2021-08-23 LAB
ANION GAP SERPL CALCULATED.3IONS-SCNC: 11 MMOL/L (ref 9–17)
BUN BLDV-MCNC: 16 MG/DL (ref 6–20)
BUN/CREAT BLD: ABNORMAL (ref 9–20)
CALCIUM SERPL-MCNC: 9.2 MG/DL (ref 8.6–10.4)
CHLORIDE BLD-SCNC: 100 MMOL/L (ref 98–107)
CO2: 23 MMOL/L (ref 20–31)
CREAT SERPL-MCNC: 0.85 MG/DL (ref 0.7–1.2)
GFR AFRICAN AMERICAN: >60 ML/MIN
GFR NON-AFRICAN AMERICAN: >60 ML/MIN
GFR SERPL CREATININE-BSD FRML MDRD: ABNORMAL ML/MIN/{1.73_M2}
GFR SERPL CREATININE-BSD FRML MDRD: ABNORMAL ML/MIN/{1.73_M2}
GLUCOSE BLD-MCNC: 91 MG/DL (ref 70–99)
HCT VFR BLD CALC: 56.4 % (ref 40.7–50.3)
HEMOGLOBIN: 18.1 G/DL (ref 13–17)
MCH RBC QN AUTO: 29.1 PG (ref 25.2–33.5)
MCHC RBC AUTO-ENTMCNC: 32.1 G/DL (ref 28.4–34.8)
MCV RBC AUTO: 90.8 FL (ref 82.6–102.9)
NRBC AUTOMATED: 0 PER 100 WBC
PARTIAL THROMBOPLASTIN TIME: 25.9 SEC (ref 20.5–30.5)
PARTIAL THROMBOPLASTIN TIME: 85.2 SEC (ref 20.5–30.5)
PDW BLD-RTO: 16.2 % (ref 11.8–14.4)
PLATELET # BLD: 644 K/UL (ref 138–453)
PMV BLD AUTO: 9.6 FL (ref 8.1–13.5)
POTASSIUM SERPL-SCNC: 4.4 MMOL/L (ref 3.7–5.3)
RBC # BLD: 6.21 M/UL (ref 4.21–5.77)
SODIUM BLD-SCNC: 134 MMOL/L (ref 135–144)
WBC # BLD: 15.4 K/UL (ref 3.5–11.3)

## 2021-08-23 PROCEDURE — 85027 COMPLETE CBC AUTOMATED: CPT

## 2021-08-23 PROCEDURE — 99223 1ST HOSP IP/OBS HIGH 75: CPT | Performed by: INTERNAL MEDICINE

## 2021-08-23 PROCEDURE — 2580000003 HC RX 258: Performed by: INTERNAL MEDICINE

## 2021-08-23 PROCEDURE — 73620 X-RAY EXAM OF FOOT: CPT

## 2021-08-23 PROCEDURE — 80048 BASIC METABOLIC PNL TOTAL CA: CPT

## 2021-08-23 PROCEDURE — 71275 CT ANGIOGRAPHY CHEST: CPT

## 2021-08-23 PROCEDURE — 6370000000 HC RX 637 (ALT 250 FOR IP): Performed by: INTERNAL MEDICINE

## 2021-08-23 PROCEDURE — 36415 COLL VENOUS BLD VENIPUNCTURE: CPT

## 2021-08-23 PROCEDURE — 99222 1ST HOSP IP/OBS MODERATE 55: CPT | Performed by: PODIATRIST

## 2021-08-23 PROCEDURE — 87040 BLOOD CULTURE FOR BACTERIA: CPT

## 2021-08-23 PROCEDURE — 6370000000 HC RX 637 (ALT 250 FOR IP): Performed by: EMERGENCY MEDICINE

## 2021-08-23 PROCEDURE — 6360000002 HC RX W HCPCS: Performed by: INTERNAL MEDICINE

## 2021-08-23 PROCEDURE — 6360000002 HC RX W HCPCS: Performed by: STUDENT IN AN ORGANIZED HEALTH CARE EDUCATION/TRAINING PROGRAM

## 2021-08-23 PROCEDURE — 6360000004 HC RX CONTRAST MEDICATION: Performed by: EMERGENCY MEDICINE

## 2021-08-23 PROCEDURE — 2500000003 HC RX 250 WO HCPCS: Performed by: INTERNAL MEDICINE

## 2021-08-23 PROCEDURE — 6360000002 HC RX W HCPCS: Performed by: NURSE PRACTITIONER

## 2021-08-23 PROCEDURE — 1200000000 HC SEMI PRIVATE

## 2021-08-23 PROCEDURE — 85730 THROMBOPLASTIN TIME PARTIAL: CPT

## 2021-08-23 PROCEDURE — 94761 N-INVAS EAR/PLS OXIMETRY MLT: CPT

## 2021-08-23 PROCEDURE — 2580000003 HC RX 258: Performed by: NURSE PRACTITIONER

## 2021-08-23 RX ORDER — SODIUM CHLORIDE 9 MG/ML
25 INJECTION, SOLUTION INTRAVENOUS PRN
Status: DISCONTINUED | OUTPATIENT
Start: 2021-08-23 | End: 2021-08-26 | Stop reason: HOSPADM

## 2021-08-23 RX ORDER — OXYCODONE HYDROCHLORIDE AND ACETAMINOPHEN 5; 325 MG/1; MG/1
2 TABLET ORAL EVERY 6 HOURS PRN
Status: DISCONTINUED | OUTPATIENT
Start: 2021-08-23 | End: 2021-08-25

## 2021-08-23 RX ORDER — ONDANSETRON 4 MG/1
4 TABLET, ORALLY DISINTEGRATING ORAL EVERY 8 HOURS PRN
Status: DISCONTINUED | OUTPATIENT
Start: 2021-08-23 | End: 2021-08-26 | Stop reason: HOSPADM

## 2021-08-23 RX ORDER — SODIUM CHLORIDE 0.9 % (FLUSH) 0.9 %
10 SYRINGE (ML) INJECTION PRN
Status: DISCONTINUED | OUTPATIENT
Start: 2021-08-23 | End: 2021-08-26 | Stop reason: HOSPADM

## 2021-08-23 RX ORDER — ASPIRIN 81 MG/1
81 TABLET, CHEWABLE ORAL DAILY
Status: DISCONTINUED | OUTPATIENT
Start: 2021-08-23 | End: 2021-08-26 | Stop reason: HOSPADM

## 2021-08-23 RX ORDER — HEPARIN SODIUM 1000 [USP'U]/ML
40 INJECTION, SOLUTION INTRAVENOUS; SUBCUTANEOUS PRN
Status: DISCONTINUED | OUTPATIENT
Start: 2021-08-23 | End: 2021-08-26

## 2021-08-23 RX ORDER — SODIUM CHLORIDE 9 MG/ML
INJECTION, SOLUTION INTRAVENOUS CONTINUOUS
Status: DISCONTINUED | OUTPATIENT
Start: 2021-08-23 | End: 2021-08-25

## 2021-08-23 RX ORDER — GABAPENTIN 600 MG/1
300 TABLET ORAL 3 TIMES DAILY
Status: DISCONTINUED | OUTPATIENT
Start: 2021-08-23 | End: 2021-08-26 | Stop reason: HOSPADM

## 2021-08-23 RX ORDER — HEPARIN SODIUM 1000 [USP'U]/ML
80 INJECTION, SOLUTION INTRAVENOUS; SUBCUTANEOUS PRN
Status: DISCONTINUED | OUTPATIENT
Start: 2021-08-23 | End: 2021-08-26

## 2021-08-23 RX ORDER — HEPARIN SODIUM 10000 [USP'U]/100ML
5-30 INJECTION, SOLUTION INTRAVENOUS CONTINUOUS
Status: DISCONTINUED | OUTPATIENT
Start: 2021-08-23 | End: 2021-08-26

## 2021-08-23 RX ORDER — ACETAMINOPHEN 650 MG/1
650 SUPPOSITORY RECTAL EVERY 6 HOURS PRN
Status: DISCONTINUED | OUTPATIENT
Start: 2021-08-23 | End: 2021-08-26 | Stop reason: HOSPADM

## 2021-08-23 RX ORDER — SODIUM CHLORIDE 0.9 % (FLUSH) 0.9 %
5-40 SYRINGE (ML) INJECTION EVERY 12 HOURS SCHEDULED
Status: DISCONTINUED | OUTPATIENT
Start: 2021-08-23 | End: 2021-08-26 | Stop reason: HOSPADM

## 2021-08-23 RX ORDER — CALCIUM CARBONATE 200(500)MG
500 TABLET,CHEWABLE ORAL DAILY
Status: DISCONTINUED | OUTPATIENT
Start: 2021-08-23 | End: 2021-08-26 | Stop reason: HOSPADM

## 2021-08-23 RX ORDER — POTASSIUM CHLORIDE 20 MEQ/1
40 TABLET, EXTENDED RELEASE ORAL PRN
Status: DISCONTINUED | OUTPATIENT
Start: 2021-08-23 | End: 2021-08-26 | Stop reason: HOSPADM

## 2021-08-23 RX ORDER — LEVOTHYROXINE SODIUM 175 UG/1
175 TABLET ORAL DAILY
Status: DISCONTINUED | OUTPATIENT
Start: 2021-08-23 | End: 2021-08-26 | Stop reason: HOSPADM

## 2021-08-23 RX ORDER — ATORVASTATIN CALCIUM 80 MG/1
80 TABLET, FILM COATED ORAL DAILY
Status: DISCONTINUED | OUTPATIENT
Start: 2021-08-23 | End: 2021-08-26 | Stop reason: HOSPADM

## 2021-08-23 RX ORDER — ACETAMINOPHEN 325 MG/1
650 TABLET ORAL EVERY 6 HOURS PRN
Status: DISCONTINUED | OUTPATIENT
Start: 2021-08-23 | End: 2021-08-26 | Stop reason: HOSPADM

## 2021-08-23 RX ORDER — MAGNESIUM SULFATE 1 G/100ML
1000 INJECTION INTRAVENOUS PRN
Status: DISCONTINUED | OUTPATIENT
Start: 2021-08-23 | End: 2021-08-26 | Stop reason: HOSPADM

## 2021-08-23 RX ORDER — VITAMIN B COMPLEX
2000 TABLET ORAL DAILY
Status: DISCONTINUED | OUTPATIENT
Start: 2021-08-23 | End: 2021-08-26 | Stop reason: HOSPADM

## 2021-08-23 RX ORDER — POLYETHYLENE GLYCOL 3350 17 G/17G
17 POWDER, FOR SOLUTION ORAL DAILY PRN
Status: DISCONTINUED | OUTPATIENT
Start: 2021-08-23 | End: 2021-08-26 | Stop reason: HOSPADM

## 2021-08-23 RX ORDER — POTASSIUM CHLORIDE 7.45 MG/ML
10 INJECTION INTRAVENOUS PRN
Status: DISCONTINUED | OUTPATIENT
Start: 2021-08-23 | End: 2021-08-26 | Stop reason: HOSPADM

## 2021-08-23 RX ORDER — FAMOTIDINE 20 MG/1
20 TABLET, FILM COATED ORAL 2 TIMES DAILY
Status: DISCONTINUED | OUTPATIENT
Start: 2021-08-23 | End: 2021-08-25

## 2021-08-23 RX ORDER — FENTANYL CITRATE 50 UG/ML
25 INJECTION, SOLUTION INTRAMUSCULAR; INTRAVENOUS
Status: DISCONTINUED | OUTPATIENT
Start: 2021-08-23 | End: 2021-08-23

## 2021-08-23 RX ORDER — HEPARIN SODIUM 1000 [USP'U]/ML
80 INJECTION, SOLUTION INTRAVENOUS; SUBCUTANEOUS ONCE
Status: COMPLETED | OUTPATIENT
Start: 2021-08-23 | End: 2021-08-23

## 2021-08-23 RX ORDER — ONDANSETRON 2 MG/ML
4 INJECTION INTRAMUSCULAR; INTRAVENOUS EVERY 6 HOURS PRN
Status: DISCONTINUED | OUTPATIENT
Start: 2021-08-23 | End: 2021-08-26 | Stop reason: HOSPADM

## 2021-08-23 RX ADMIN — DOXYCYCLINE 100 MG: 100 INJECTION, POWDER, LYOPHILIZED, FOR SOLUTION INTRAVENOUS at 16:51

## 2021-08-23 RX ADMIN — OXYCODONE HYDROCHLORIDE AND ACETAMINOPHEN 2 TABLET: 5; 325 TABLET ORAL at 13:26

## 2021-08-23 RX ADMIN — CEFTRIAXONE SODIUM 1000 MG: 1 INJECTION, POWDER, FOR SOLUTION INTRAMUSCULAR; INTRAVENOUS at 16:17

## 2021-08-23 RX ADMIN — HYDROMORPHONE HYDROCHLORIDE 0.5 MG: 1 INJECTION, SOLUTION INTRAMUSCULAR; INTRAVENOUS; SUBCUTANEOUS at 12:13

## 2021-08-23 RX ADMIN — SODIUM CHLORIDE: 9 INJECTION, SOLUTION INTRAVENOUS at 13:20

## 2021-08-23 RX ADMIN — SODIUM CHLORIDE, PRESERVATIVE FREE 10 ML: 5 INJECTION INTRAVENOUS at 12:14

## 2021-08-23 RX ADMIN — FENTANYL CITRATE 25 MCG: 50 INJECTION, SOLUTION INTRAMUSCULAR; INTRAVENOUS at 10:26

## 2021-08-23 RX ADMIN — GABAPENTIN 300 MG: 600 TABLET ORAL at 13:52

## 2021-08-23 RX ADMIN — SODIUM CHLORIDE, PRESERVATIVE FREE 10 ML: 5 INJECTION INTRAVENOUS at 21:05

## 2021-08-23 RX ADMIN — SODIUM CHLORIDE, PRESERVATIVE FREE 10 ML: 5 INJECTION INTRAVENOUS at 10:28

## 2021-08-23 RX ADMIN — HYDROMORPHONE HYDROCHLORIDE 0.5 MG: 1 INJECTION, SOLUTION INTRAMUSCULAR; INTRAVENOUS; SUBCUTANEOUS at 16:19

## 2021-08-23 RX ADMIN — FAMOTIDINE 20 MG: 20 TABLET, FILM COATED ORAL at 21:04

## 2021-08-23 RX ADMIN — FAMOTIDINE 20 MG: 20 TABLET, FILM COATED ORAL at 13:26

## 2021-08-23 RX ADMIN — HEPARIN SODIUM AND DEXTROSE 18 UNITS/KG/HR: 10000; 5 INJECTION INTRAVENOUS at 12:00

## 2021-08-23 RX ADMIN — OXYCODONE HYDROCHLORIDE AND ACETAMINOPHEN 2 TABLET: 5; 325 TABLET ORAL at 21:04

## 2021-08-23 RX ADMIN — HEPARIN SODIUM 7880 UNITS: 1000 INJECTION INTRAVENOUS; SUBCUTANEOUS at 11:54

## 2021-08-23 RX ADMIN — GABAPENTIN 300 MG: 600 TABLET ORAL at 21:04

## 2021-08-23 RX ADMIN — IOPAMIDOL 100 ML: 755 INJECTION, SOLUTION INTRAVENOUS at 14:15

## 2021-08-23 ASSESSMENT — PAIN SCALES - GENERAL
PAINLEVEL_OUTOF10: 10
PAINLEVEL_OUTOF10: 10
PAINLEVEL_OUTOF10: 8
PAINLEVEL_OUTOF10: 8
PAINLEVEL_OUTOF10: 9
PAINLEVEL_OUTOF10: 9
PAINLEVEL_OUTOF10: 10

## 2021-08-23 ASSESSMENT — ENCOUNTER SYMPTOMS
CHEST TIGHTNESS: 0
SHORTNESS OF BREATH: 0
EYE ITCHING: 0
VOMITING: 0
EYE PAIN: 0
FACIAL SWELLING: 0
CHOKING: 0
DIARRHEA: 0
COUGH: 0
COLOR CHANGE: 1
BACK PAIN: 0
NAUSEA: 0

## 2021-08-23 ASSESSMENT — PAIN DESCRIPTION - LOCATION
LOCATION: FOOT

## 2021-08-23 NOTE — FLOWSHEET NOTE
08/23/21 1118   Encounter Summary   Services provided to: Patient   Referral/Consult From: Rounding   Continue Visiting   (8/23/2021)   Complexity of Encounter Low   Length of Encounter 15 minutes   Routine   Type Initial   Assessment Coping   Intervention Active listening;Nurtured hope   Outcome Acceptance

## 2021-08-23 NOTE — CONSULTS
[address]      Patient's Name/ Date of Birth/ Gender: Isela Cornejo / 1980 (35 y.o.) / male     Referring Physician: Patrice Andujar MD    Consulting Physician: Dr. Lucia Ritter    History of present Illness: Pt is a 35 y.o. male w/ PMHx of papillary thyroid cancer (2016) who presents with foot pain and swelling, cyanosis of L 2nd and 3rd toes, and R 4th toes. He states his L toes have been blue for a \"few months\" and the R foot started yesterday. Since yesterday has also had increased swelling to L foot. He also has a wound to his L 3rd toes that he states has been there \"for a few months\", is painful and is the reason he walks with a cane. He tried peroxide on it once a few months ago but has only used soap and water since that time. He was seen at OSH ED for it in the past but did not follow up with podiatry. He has never seen a vascular surgeon. He is on eliquis for history of RLE DVT and blue toe syndrome diagnosed in 2019. Transferred from [facility] this AM. At OSH CT A/P with bilateral 3 vessel run off, faint to no opacification of L 2nd-4th digits. Also showed aorto-iliac atherosclerotic calcifications and ?acute CHUCK thrombus. Denies n/v/diarrhea, hematochezia, abd pain. Denies taking ASA. Past Medical History:  has a past medical history of Headache and Hypothyroidism. Past Surgical History:   Past Surgical History:   Procedure Laterality Date    TOTAL THYROIDECTOMY Bilateral 2017       Social History:  reports that he has been smoking. He has never used smokeless tobacco.    Family History: family history includes Asthma in his brother; Cancer in his mother; High Blood Pressure in his father. Review of Systems:   General: Denies fever, chills, night sweats, weight loss, malaise, fatigue  HEENT: Denies sore throat, sinus problems, allergic rhinosinusitis  Card: Denies chest pain, palpitations, orthopnea/PND.   Pulm: Denies cough, shortness of breath  GI: denies history of constipation, diarrhea, hematochezia or melena  : Denies polyuria, dysuria, hematuria  Endo: Denies diabetes. Positive for hypothyroid  Heme: Denies anemia, +h/o DVT and blue toe syndrome in RLE  Neuro: Denies h/o CVA, TIA  Skin: Denies rashes, ulcers  Musculoskeletal: Denies muscle, joint, back pain. + L foot pain. Allergies: Patient has no known allergies.     Current Meds:  Current Facility-Administered Medications:     aspirin chewable tablet 81 mg, 81 mg, Oral, Daily, Arcelia Rough, APRN - CNP    atorvastatin (LIPITOR) tablet 80 mg, 80 mg, Oral, Daily, Arcelia Rough, APRN - CNP    calcium carbonate (TUMS) chewable tablet 500 mg, 500 mg, Oral, Daily, Arcelia Rough, APRN - CNP    Vitamin D (CHOLECALCIFEROL) tablet 2,000 Units, 2,000 Units, Oral, Daily, Arcelia Rough, APRN - CNP    levothyroxine (SYNTHROID) tablet 175 mcg, 175 mcg, Oral, Daily, Arcelia Rough, APRN - CNP    sodium chloride flush 0.9 % injection 5-40 mL, 5-40 mL, Intravenous, 2 times per day, Arcelia Rough, APRN - CNP, 10 mL at 08/23/21 1028    sodium chloride flush 0.9 % injection 10 mL, 10 mL, Intravenous, PRN, Arcelia Rough, APRN - CNP, 10 mL at 08/23/21 1214    0.9 % sodium chloride infusion, 25 mL, Intravenous, PRN, Arcelia Rough, APRN - CNP    potassium chloride (KLOR-CON M) extended release tablet 40 mEq, 40 mEq, Oral, PRN **OR** potassium bicarb-citric acid (EFFER-K) effervescent tablet 40 mEq, 40 mEq, Oral, PRN **OR** potassium chloride 10 mEq/100 mL IVPB (Peripheral Line), 10 mEq, Intravenous, PRN, Arcelia Rough, APRN - CNP    magnesium sulfate 1000 mg in dextrose 5% 100 mL IVPB, 1,000 mg, Intravenous, PRN, Arcelia Rough, APRN - CNP    ondansetron (ZOFRAN-ODT) disintegrating tablet 4 mg, 4 mg, Oral, Q8H PRN **OR** ondansetron (ZOFRAN) injection 4 mg, 4 mg, Intravenous, Q6H PRN, PHILIP Hogan CNP    polyethylene glycol (GLYCOLAX) packet 17 g, 17 g, Oral, Daily PRN, PHILIP Hogan CNP   acetaminophen (TYLENOL) tablet 650 mg, 650 mg, Oral, Q6H PRN **OR** acetaminophen (TYLENOL) suppository 650 mg, 650 mg, Rectal, Q6H PRN, PHILIP Dias - CNP    heparin (porcine) injection 7,880 Units, 80 Units/kg, Intravenous, PRN, Ivy Zheng, APRN - CNP    heparin (porcine) injection 3,940 Units, 40 Units/kg, Intravenous, PRN, Ivy Zheng APRN - CNP    heparin 25,000 units in dextrose 5% 250 mL (premix) infusion, 5-30 Units/kg/hr, Intravenous, Continuous, PHILIP Dias - CNP, Last Rate: 17.7 mL/hr at 08/23/21 1200, 18 Units/kg/hr at 08/23/21 1200    HYDROmorphone (DILAUDID) injection 0.5 mg, 0.5 mg, Intravenous, Q3H PRN, Jessica Leal MD, 0.5 mg at 08/23/21 1213    oxyCODONE-acetaminophen (PERCOCET) 5-325 MG per tablet 2 tablet, 2 tablet, Oral, Q6H PRN, Jessica Leal MD    0.9 % sodium chloride infusion, , Intravenous, Continuous, Jessica Leal MD    doxycycline (VIBRAMYCIN) 100 mg in dextrose 5 % 100 mL IVPB, 100 mg, Intravenous, Q12H, Jessica Leal MD    cefTRIAXone (ROCEPHIN) 1000 mg IVPB in 50 mL D5W minibag, 1,000 mg, Intravenous, Q24H, Jessica Leal MD    famotidine (PEPCID) tablet 20 mg, 20 mg, Oral, BID, Jessica Leal MD    Vital Signs:  Vitals:    08/23/21 1023   BP: 128/82   Pulse:    Resp:    Temp:    SpO2:        Physical Exam:  Vital signs and Nurse's note reviewed  Gen:  A&Ox3, NAD  HEENT: PERRLA, EOMI, no scleral icterus, oral mucosa moist  Neck: Supple  Chest: Symmetric rise with inhalation, no evidence of trauma  CVS: Regular rate and rhythm  Resp: Good bilateral air entry  Abd: soft, non-tender, non-distended  Ext: L foot edema. L 2nd and 3rd toes cyanotic, chronic wound to 3rd toe. R 4th toe with cyanosis. Ecchymosis to plantar aspect of L foot. peripheral pulses 2+ DP/PT  CNS: Moves all extremities, no gross focal motor deficits  Skin: L 3rd toe chronic wound.      Labs:   Lab Results   Component Value Date    WBC 15.4 08/23/2021    HGB 18.1 08/23/2021    HCT 56.4 08/23/2021    MCV 90.8 08/23/2021     08/23/2021     Lab Results   Component Value Date     08/17/2018    K 3.8 08/17/2018     08/17/2018    CO2 26 08/17/2018    BUN 16 08/17/2018    CREATININE 0.87 08/17/2018    GLUCOSE 89 08/17/2018    CALCIUM 9.3 08/17/2018     No results found for: INR    Imaging:  CT A/P 8/22/21:  There is nonopacification of approximately 3.5 cm segment of the proximal   CHUCK. Given some adjacent stranding and dilatation of this vessel acute thrombus   is suspected. There is reconstitution distally. No associated abnormal bowel   wall thickening, pneumatosis or free air. 2. Patent bilateral lower extremity vasculature with three-vessel runoff to the   level of the ankles. There is faint to no opacification of the digital arteries   of the right second and fourth digital arteries or left fourth digit although   this may in part be related to the small size of these arteries in physiologic   variation. 3. No abdominal aortic aneurysm or dissection. Impression:  Patient Active Problem List   Diagnosis    Thyroid cancer ([address]    Hypothyroidism    Papillary thyroid carcinoma ([address]    Arterial embolism (HCC)    Tobacco abuse    Cellulitis of third toe of left foot    Toe cyanosis         Recommendation:    1. Continue heparin for now. Consider transitioning to lovenox given hx of papillary thyroid cancer and continued thrombus on Eliquis  2. F/u Echo  3. F/u CTA Chest w/ contrast. Cr 1.17 at OSH (0.9-1.3)  4. Recommend hypercoaguability studies  5. Medical management per Primary team  6. No indication for operative intervention at this time.         Ira Sanford DO

## 2021-08-23 NOTE — H&P
[facility]  Office: [address], DO, Levell Ebbs, DO, Vasiliy Wang, DO, Carisa Buchanan Blood, DO, Neil Lara MD, Darling Bo MD, Shin Kuo MD, Naveen Cisneros MD, Roger Ramos MD, Ivy Johnson MD, Jorge Park MD, Shahid Lockhart, DO, Geovanni Bauman MD, Henna Carrington, DO, Samantha Ruth MD,  Adiel Leo, DO, Dee Goldsmith MD, Franco Shanks MD, Raenette Felty, MD, Kyra Lambert MD, Jermaine Saez MD, Scottie Bains MD, Keira Foster MD, Patricia Jones, [facility], [address], CNP, Kourtney Fix, CNP, Shana Kimble, CNS, Bill , CNP, Genaro Howard, CNP, Filomena High, CNP, Esteban Blend, CNP, Grace Vázquez, CNP, Kenneth Emery PA-C, Gustavo Harris, DNP, Ricardo Oris, CNP, Gautam Garcia, CNP, Werner Asters, CNP, Marti Olivarez, CNP, Dar Cleaning, CNP, Jeannette Levine, CNP, Tian Archer, CNP, Hernan Limb, CNP         [facility]    HISTORY AND PHYSICAL EXAMINATION            Date:   8/23/2021  Patient name:  Dom Arango  Date of admission:  8/23/2021  9:19 AM  MRN:   [id number]  Account:  [de-identified]  YOB: 1980  PCP:    Michael Ledbetter  Room:   2001/2001-01  Code Status:    Full Code    Chief Complaint:     Left foot pain and discoloration    History Obtained From:     patient, electronic medical record    History of Present Illness:     Dom Arango is a 35 y.o. Non- / non  male who presents with left foot pain, swelling and discolaration and is admitted to the hospital for the management of Arterial embolism ([address] Patient is a 35 yr old  male with a [facility] thyroid cancer and tobacco abuse who presented to an outside facility with left foot pain, swelling and discolored toes. He has had issues with blue toes of his right foot before in the past.  He was seen in [facility] and started on a Heparin gtt a few years ago. The discoloration improved and the pain lessened. He was then diagnosed with thyroid cancer about 2 years ago and had 3 open neck surgeries to remove his thyroid gland and 31 lymph nodes. He did have some lymph node spread, and then underwent 3-4 radiation treatments. He follows with ENT, Dr. Elly Adkins. Patient smokes about 10 cigs a day. He has stress and has been unable to quick smoking. He has had pain and swelling to the 3rd left digit for a few months, he was told it might be infected, no drainage. He denies any fevers. His 2nd left toe has turned dusky over the last few days and hurts tremendously. No sob, chest pain or swelling of his legs. He now notices left foot swelling, and redness on the top of his foot. He was transferred on a Heparin gtt and has a consult to Vascular surgery. He does admit to missing a few doses of Eliquis. CTA angio abd:    1. Nonopacification of approx 3.5 cm segment of the proximal CHUCK. Acute thrombus is expected, there is reconstitution distally, no abnormal bowel wall thickening, pneumatosis, or free air    2. Patent bilat lower extremity vasculature with 3 vessel run-off to the level of the ankles. There is faint to no opacification of the digital arteries of the right 2nd and 4th digital arteries or left 4th digit, may be related to small size     Xray Left Foot    There is cortical irregularity along the distal tuft of the 3rd distal phalanx concerning for infection, no definite gas within soft tissues    Labs:  Cr 1.17, WBC: 20.4, CRP 2.3. Past Medical History:     Past Medical History:   Diagnosis Date    Headache     Hypothyroidism         Past Surgical History:     Past Surgical History:   Procedure Laterality Date    TOTAL THYROIDECTOMY Bilateral 2017        Medications Prior to Admission:     Prior to Admission medications    Medication Sig Start Date End Date Taking?  Authorizing Provider   levothyroxine (SYNTHROID) 175 MCG tablet Once a day 12/22/20  Yes Morena Hale MD   apixaban (ELIQUIS) 5 MG TABS tablet Take by mouth 2 times daily   Yes Historical Provider, MD   aspirin 81 MG tablet Take 81 mg by mouth daily    Historical Provider, MD   atorvastatin (LIPITOR) 80 MG tablet Take 80 mg by mouth daily    Historical Provider, MD   calcium carbonate (OSCAL) 500 MG TABS tablet Take 1 tablet by mouth daily 1/29/18   Wellington Hutchinson MD   gabapentin (NEURONTIN) 300 MG capsule Take 300 mg by mouth 3 times daily    Historical Provider, MD   Cholecalciferol (VITAMIN D3) 2000 units TABS Take by mouth    Historical Provider, MD        Allergies:     Patient has no known allergies. Social History:     Tobacco:    reports that he has been smoking. He has never used smokeless tobacco.  Alcohol:      has no history on file for alcohol use. Drug Use:  has no history on file for drug use. Family History:     Family History   Problem Relation Age of Onset    Cancer Mother     High Blood Pressure Father     Asthma Brother        Review of Systems:     Positive and Negative as described in HPI.     CONSTITUTIONAL:  negative for fevers, chills, sweats, fatigue, weight loss  HEENT:  negative for vision, hearing changes, runny nose, + throat pain  RESPIRATORY:  negative for shortness of breath, cough, congestion, wheezing  CARDIOVASCULAR:  negative for chest pain, palpitations  GASTROINTESTINAL:  negative for nausea, vomiting, diarrhea, constipation, change in bowel habits, abdominal pain   GENITOURINARY:  negative for difficulty of urination, burning with urination, frequency   INTEGUMENT:  negative for rash, skin lesions, easy bruising   HEMATOLOGIC/LYMPHATIC:  negative for swelling/edema   ALLERGIC/IMMUNOLOGIC:  negative for urticaria , itching  ENDOCRINE:  negative increase in drinking, increase in urination, hot or cold intolerance  MUSCULOSKELETAL:  Positive joint pains, muscle aches, swelling of joints  NEUROLOGICAL:  negative for headaches, dizziness, lightheadedness, numbness, pain, tingling extremities  BEHAVIOR/PSYCH:  negative for depression, + anxiety    Physical Exam:   /82   Pulse 56   Temp 97.7 °F (36.5 °C) (Oral)   Resp 13   Ht 6' 1\" (1.854 m)   Wt 217 lb 1.6 oz (98.5 kg)   SpO2 93%   BMI 28.64 kg/m²   Temp (24hrs), Av.7 °F (36.5 °C), Min:97.7 °F (36.5 °C), Max:97.7 °F (36.5 °C)    No results for input(s): POCGLU in the last 72 hours.   No intake or output data in the 24 hours ending 21 1214    General Appearance: alert, well appearing, and in no acute distress  Mental status: oriented to person, place, and time  Head: normocephalic, atraumatic  Eye: no icterus, redness, pupils equal and reactive, extraocular eye movements intact, conjunctiva clear  Ear: normal external ear, no discharge, hearing intact  Nose: no drainage noted  Mouth: mucous membranes moist  Neck: supple, no carotid bruits, thyroid not palpable  Lungs: Bilateral equal air entry, clear to ausculation, no wheezing, rales or rhonchi, normal effort  Cardiovascular: normal rate, regular rhythm, no murmur, gallop, rub  Abdomen: Soft, nontender, nondistended, normal bowel sounds, no hepatomegaly or splenomegaly  Neurologic: There are no new focal motor or sensory deficits, normal muscle tone and bulk, no abnormal sensation, normal speech, cranial nerves II through XII grossly intact  Skin: + dusky left 2nd toe, + thickened nail of 3rd left toe, dry skin, + erythema and swelling   Extremities: peripheral pulses palpable, + left foot edema, mild erythema skin  Psych: normal affect    Investigations:      Laboratory Testing:  Recent Results (from the past 24 hour(s))   CBC    Collection Time: 21 10:56 AM   Result Value Ref Range    WBC 15.4 (H) 3.5 - 11.3 k/uL    RBC 6.21 (H) 4.21 - 5.77 m/uL    Hemoglobin 18.1 (H) 13.0 - 17.0 g/dL    Hematocrit 56.4 (H) 40.7 - 50.3 %    MCV 90.8 82.6 - 102.9 fL    MCH 29.1 25.2 - 33.5 pg    MCHC 32.1 28.4 - 34.8 g/dL    RDW 16.2 (H) 11.8 - 14.4 %    Platelets 256 (H) 014 - 453 k/uL    MPV 9.6 8.1 - 13.5 fL    NRBC Automated 0.0 0.0 per 100 WBC   APTT    Collection Time: 08/23/21 10:56 AM   Result Value Ref Range    PTT 25.9 20.5 - 30.5 sec       Imaging/Diagnostics:  No results found. Assessment :      Hospital Problems         Last Modified POA    * (Principal) Arterial embolism ([address] 8/23/2021 Yes    Hypothyroidism 8/23/2021 Yes    Tobacco abuse 8/23/2021 Yes    Cellulitis of third toe of left foot 8/23/2021 Yes    Toe cyanosis 8/23/2021 Yes          Plan:     Patient status inpatient in the Progressive Unit/Step down    Left 2nd toe cyanosis- from blue toe syndrome? Started on Heparin gtt, statin, ASA. Vascular consulted. CTA angio abd reviewed. Encouraged pt to stop smoking. Left 3rd toe cellulitis / osteo? Concern for dry gangrene. Will consult Podiatry for assistance. Stop IV Fentanyl, change to IV Dilaudid prn, Percocet prn pain. Check BC X 2, start Doxycycline IV, Rocephin. Cr elevated  Inferior mesentaric artery thrombus-  Incidental finding? No abdominal pain, on Heparin gtt, await Vascular recommendations. Check Echo. Telemetry  Hypothyroid s/p total thyroidectomy for papillary thyroid cancer- resume Levothyroxine, check TSH  Tobacco abuse- encourage cessation  Start IVF  Gi proph    dw nurse    Consultations:   IP CONSULT TO VASCULAR SURGERY  IP CONSULT TO PODIATRY     Patient is admitted as inpatient status because of co-morbidities listed above, severity of signs and symptoms as outlined, requirement for current medical therapies and most importantly because of direct risk to patient if care not provided in a hospital setting. Expected length of stay > 48 hours.     Puneet Russo MD  8/23/2021  12:14 PM    Copy sent to Dr. Maria Victoria Waterman

## 2021-08-23 NOTE — PROGRESS NOTES
[facility] Tele[facility] applied. vitals recorded. Patient oriented to room.

## 2021-08-23 NOTE — CONSULTS
Consultation Note  Podiatric Medicine and Surgery     Subjective     Chief Complaint: Left foot pain and discoloration    HPI:  Wendy Keller is a 35 y.o. male seen at [facility]. [facility] floor for left foot pain, swelling and discoloration to the second and third digit of the left foot and right fourth digit. Patient has a past medical history of thyroid cancer and tobacco abuse. Patient states that he has had issues with blue toes on his right foot in the past when he was getting treated for the thyroid cancer in 2016. Patient states that his discoloration improved when he received blood thinner at [facility] a few years ago. At that time he was told that the discoloration occurred due to small clots in his vessels of his right foot. Today he presents with discoloration on the left second and third digit and right fourth digit. Patient states that he has not seen a podiatrist in the past.  Patient states that he has more of a burning and tingling sensation to his left second and third digit. Patient denies any other pedal complaints at this time. PCP is Ella Ordonez    ROS:   Review of Systems   Constitutional: Negative for chills, fatigue and fever. HENT: Negative for facial swelling. Eyes: Negative for pain and itching. Respiratory: Negative for cough, choking, chest tightness and shortness of breath. Cardiovascular: Negative for leg swelling. Gastrointestinal: Negative for diarrhea, nausea and vomiting. Musculoskeletal: Negative for back pain, neck pain and neck stiffness. Skin: Positive for color change, pallor and wound. Past Medical History   has a past medical history of Headache and Hypothyroidism. Past Surgical History   has a past surgical history that includes Total Thyroidectomy (Bilateral, 2017). Medications  Prior to Admission medications    Medication Sig Start Date End Date Taking?  Authorizing Provider   levothyroxine (SYNTHROID) 175 MCG tablet Once a day 12/22/20  Yes Wellington Hutchinson MD   apixaban (ELIQUIS) 5 MG TABS tablet Take by mouth 2 times daily   Yes Historical Provider, MD   aspirin 81 MG tablet Take 81 mg by mouth daily    Historical Provider, MD   atorvastatin (LIPITOR) 80 MG tablet Take 80 mg by mouth daily    Historical Provider, MD   calcium carbonate (OSCAL) 500 MG TABS tablet Take 1 tablet by mouth daily 1/29/18   Wellington Hutchinson MD   gabapentin (NEURONTIN) 300 MG capsule Take 300 mg by mouth 3 times daily    Historical Provider, MD   Cholecalciferol (VITAMIN D3) 2000 units TABS Take by mouth    Historical Provider, MD    Scheduled Meds:   aspirin  81 mg Oral Daily    atorvastatin  80 mg Oral Daily    calcium carbonate  500 mg Oral Daily    Vitamin D  2,000 Units Oral Daily    levothyroxine  175 mcg Oral Daily    sodium chloride flush  5-40 mL Intravenous 2 times per day    doxycycline (VIBRAMYCIN) IV  100 mg Intravenous Q12H    cefTRIAXone (ROCEPHIN) IV  1,000 mg Intravenous Q24H    famotidine  20 mg Oral BID    gabapentin  300 mg Oral TID     Continuous Infusions:   sodium chloride      heparin (PORCINE) Infusion Stopped (08/23/21 1845)    sodium chloride 75 mL/hr at 08/23/21 1320     PRN Meds:.sodium chloride flush, sodium chloride, potassium chloride **OR** potassium alternative oral replacement **OR** potassium chloride, magnesium sulfate, ondansetron **OR** ondansetron, polyethylene glycol, acetaminophen **OR** acetaminophen, heparin (porcine), heparin (porcine), HYDROmorphone, oxyCODONE-acetaminophen    Allergies  has No Known Allergies. Family History  family history includes Asthma in his brother; Cancer in his mother; High Blood Pressure in his father. Social History   reports that he has been smoking. He has never used smokeless tobacco.   has no history on file for alcohol use.   has no history on file for drug use. Objective     Vitals:  No data found.   Average, Min, and Max for last 24 hours Vitals:  TEMPERATURE:  Temp Av.7 °F (36.5 °C)  Min: 97.7 °F (36.5 °C)  Max: 97.7 °F (36.5 °C)    RESPIRATIONS RANGE: Resp  Av  Min: 13  Max: 13    PULSE RANGE: Pulse  Av  Min: 56  Max: 56    BLOOD PRESSURE RANGE:  Systolic (50ZVQ), UVY:645 , Min:125 , QVH:701   ; Diastolic (55NOF), USC:70, Min:82, Max:85      PULSE OXIMETRY RANGE: SpO2  Av %  Min: 93 %  Max: 93 %  I&O:  No intake/output data recorded. CBC:  Recent Labs     21  1056   WBC 15.4*   HGB 18.1*   HCT 56.4*   *        BMP:  Recent Labs     21  1446   *   K 4.4      CO2 23   BUN 16   CREATININE 0.85   GLUCOSE 91   CALCIUM 9.2        Coags:  Recent Labs     21  1056 21  1727   APTT 25.9 85.2*       No results found for: LABA1C  No results found for: SEDRATE  No results found for: CRP      Lower Extremity Physical Exam:  Vascular: DP and PT pulses are palpable. CFT <3 seconds to digits 1-5 on the right and digits 1, 4 and 5 on the left foot. Hair growth is absent to the level of the digits. Discoloration and nonpitting edema noted to left second and third digit. Neuro: Saph/sural/SP/DP/plantar sensation intact to light touch. Musculoskeletal: Muscle strength is 5/5 to all lower extremity muscle groups. Gross deformity is absent. Dermatologic: Cyanosis of the second and third digit of left toe appreciated. Mild cyanosis noted to the dorsal aspect of the right fourth digit. Ecchymosis to plantar aspect of the left foot. Dried exudate noted to the distal tuft of the left third digit. Otherwise no open lesions noted. No associated mal odor appreciated. Erythema noted to the left foot to ankle. Does not probe to bone, sinus track, or undermine. No fluctuance, crepitus, or induration. Interdigital maceration absent. Clinical:               Imaging:   CTA CHEST W CONTRAST   Preliminary Result   No acute abnormality of the thoracic aorta (when taking into account mild   pulsation/motion artifact). Mild secretions/debris within the trachea and bronchus intermedius. Probable   subsegmental atelectasis/scarring in the lingula. Couple of scattered tiny nodules measuring 4 mm or less. XR FOOT LEFT (2 VIEWS)   Final Result   No acute osseous or soft tissue abnormality. Cultures: None    Assessment     Louise Davies is a 35 y.o. male with   Acute limb ischemia secondary to micro emboli event, left lower extremity   Tobacco abuse  History of thyroid cancer  History of chemotherapy     Principal Problem:    Arterial embolism (HCC)  Active Problems:    Hypothyroidism    Tobacco abuse    Cellulitis of third toe of left foot    Toe cyanosis  Resolved Problems:    History of deep vein thrombosis of lower extremity        Plan     Patient examined and evaluated at bedside   Treatment options discussed in detail with the patient  Radiographs of left foot reviewed and discussed in detail with patient. There is no acute osseous abnormality noted. Internal medicine as primary for medical management  Vascular following- no intervention planned at this time, follow-up echo, follow-up CTA  No surgical intervention planned at this time from podiatry standpoint. Low suspicion for infectious process of the left foot. Given the overall clinical picture patient has acute limb ischemia likely secondary to microembolic event of the left foot. Suspect hypercoagulable state due to history of chemotherapy and previous microembolic event. Agree with hypercoagulability studies. Continue percocet for ischemic pain. We will plan to closely monitor demarcation of the left foot over the next 24 to 48 hours. No dressings to the left foot. Weightbearing as tolerated to the left foot      Chandu Pratt DPM   Podiatric Medicine & Surgery   8/23/2021 at 7:20 PM     Senior Resident Statement:  I have discussed the case, including pertinent history and exam findings with the resident.  I agree with the assessment, plan and orders as documented by the intern. Any changes were made in the note above. Electronically signed by Matheus Guthrie DPM on 8/23/2021 at 9:43 PM    I performed a history and physical examination of the patient and discussed management with the resident. I reviewed the residents note and agree with the documented findings and plan of care. Any areas of disagreement are noted on the chart. I was personally present for the key portions of any procedures. I have documented in the chart those procedures where I was not present during the key portions. I have reviewed the Podiatry Resident progress note. I agree with the chief complaint, past medical history, past surgical history, allergies, medications, social and family history as documented unless otherwise noted below. Documentation of the HPI, Physical Exam and Medical Decision Making performed by medical students or scribes is based on my personal performance of the HPI, PE and MDM. I have personally evaluated this patient and have completed at least one if not all key elements of the E/M (history, physical exam, and MDM). Additional findings are as noted.      Lucrecia Francisco DPM on 8/24/2021 at 6:33 AM  Board Certified, American Board of Podiatric Surgery  Fellow, [employer] [facility] [employer]

## 2021-08-23 NOTE — CARE COORDINATION
Case Management Initial Discharge Plan  Adelina Dickey,             Met with:patient to discuss discharge plans. Information verified: address, contacts, phone number, , insurance Yes  Insurance Provider: Medicare. Medicaid    Emergency Contact/Next of Kin name & number: mother [phone]  Who are involved in patient's support system? Mother, sister    PCP: Nilson Amato  Date of last visit: year ago      Discharge Planning    Living Arrangements:  Parent     Home has one story with ramp    Patient able to perform ADL's:Independent    Current Services (outpatient & in home) none  DME equipment: crutches  DME provider:     Is patient receiving oral anticoagulation therapy? No          Potential Assistance Needed:  N/A    Patient agreeable to home care: No  [place] of choice provided:  n/a    Prior SNF/Rehab Placement and Facility: N/A  Agreeable to SNF/Rehab: No  [place] of choice provided: n/a     Evaluation: n/a    Expected Discharge date:  21    Patient expects to be discharged to:   home    If home: is the family and/or caregiver willing & able to provide support at home? yes  Who will be providing this support? mother*    Follow Up Appointment: Best Day/ Time: Monday AM    Transportation provider: family  Transportation arrangements needed for discharge: No    Readmission Risk              Risk of Unplanned Readmission:  9             Does patient have a readmission risk score greater than 14?: No  If yes, follow-up appointment must be made within 7 days of discharge.      Goals of Care: no left  Foot pain      Educated patient on transitional options, provided freedom of choice and are agreeable with plan      Discharge Plan: home with motherjahaira     [address] in [facility]      Electronically signed by Noris Oneal RN on 21 at 12:12 PM EDT

## 2021-08-24 LAB
ANION GAP SERPL CALCULATED.3IONS-SCNC: 9 MMOL/L (ref 9–17)
BUN BLDV-MCNC: 16 MG/DL (ref 6–20)
BUN/CREAT BLD: ABNORMAL (ref 9–20)
CALCIUM SERPL-MCNC: 8.3 MG/DL (ref 8.6–10.4)
CHLORIDE BLD-SCNC: 102 MMOL/L (ref 98–107)
CO2: 22 MMOL/L (ref 20–31)
CREAT SERPL-MCNC: 1.01 MG/DL (ref 0.7–1.2)
GFR AFRICAN AMERICAN: >60 ML/MIN
GFR NON-AFRICAN AMERICAN: >60 ML/MIN
GFR SERPL CREATININE-BSD FRML MDRD: ABNORMAL ML/MIN/{1.73_M2}
GFR SERPL CREATININE-BSD FRML MDRD: ABNORMAL ML/MIN/{1.73_M2}
GLUCOSE BLD-MCNC: 108 MG/DL (ref 70–99)
HCT VFR BLD CALC: 53.1 % (ref 40.7–50.3)
HEMOGLOBIN: 16.8 G/DL (ref 13–17)
INR BLD: 1
LV EF: 65 %
LVEF MODALITY: NORMAL
MCH RBC QN AUTO: 29.1 PG (ref 25.2–33.5)
MCHC RBC AUTO-ENTMCNC: 31.6 G/DL (ref 28.4–34.8)
MCV RBC AUTO: 92 FL (ref 82.6–102.9)
NRBC AUTOMATED: 0 PER 100 WBC
PARTIAL THROMBOPLASTIN TIME: 40.5 SEC (ref 20.5–30.5)
PARTIAL THROMBOPLASTIN TIME: 59.4 SEC (ref 20.5–30.5)
PARTIAL THROMBOPLASTIN TIME: 60.6 SEC (ref 20.5–30.5)
PDW BLD-RTO: 15.7 % (ref 11.8–14.4)
PLATELET # BLD: 579 K/UL (ref 138–453)
PMV BLD AUTO: 9.7 FL (ref 8.1–13.5)
POTASSIUM SERPL-SCNC: 4.7 MMOL/L (ref 3.7–5.3)
PROTHROMBIN TIME: 10.9 SEC (ref 9.1–12.3)
RBC # BLD: 5.77 M/UL (ref 4.21–5.77)
SODIUM BLD-SCNC: 133 MMOL/L (ref 135–144)
WBC # BLD: 16 K/UL (ref 3.5–11.3)

## 2021-08-24 PROCEDURE — 99232 SBSQ HOSP IP/OBS MODERATE 35: CPT | Performed by: STUDENT IN AN ORGANIZED HEALTH CARE EDUCATION/TRAINING PROGRAM

## 2021-08-24 PROCEDURE — 97161 PT EVAL LOW COMPLEX 20 MIN: CPT

## 2021-08-24 PROCEDURE — 6370000000 HC RX 637 (ALT 250 FOR IP): Performed by: NURSE PRACTITIONER

## 2021-08-24 PROCEDURE — 85730 THROMBOPLASTIN TIME PARTIAL: CPT

## 2021-08-24 PROCEDURE — 6370000000 HC RX 637 (ALT 250 FOR IP): Performed by: INTERNAL MEDICINE

## 2021-08-24 PROCEDURE — 2580000003 HC RX 258: Performed by: INTERNAL MEDICINE

## 2021-08-24 PROCEDURE — 6360000002 HC RX W HCPCS: Performed by: NURSE PRACTITIONER

## 2021-08-24 PROCEDURE — 6370000000 HC RX 637 (ALT 250 FOR IP): Performed by: EMERGENCY MEDICINE

## 2021-08-24 PROCEDURE — 2580000003 HC RX 258: Performed by: STUDENT IN AN ORGANIZED HEALTH CARE EDUCATION/TRAINING PROGRAM

## 2021-08-24 PROCEDURE — 6360000002 HC RX W HCPCS: Performed by: STUDENT IN AN ORGANIZED HEALTH CARE EDUCATION/TRAINING PROGRAM

## 2021-08-24 PROCEDURE — 1200000000 HC SEMI PRIVATE

## 2021-08-24 PROCEDURE — 36415 COLL VENOUS BLD VENIPUNCTURE: CPT

## 2021-08-24 PROCEDURE — 85027 COMPLETE CBC AUTOMATED: CPT

## 2021-08-24 PROCEDURE — 6360000002 HC RX W HCPCS: Performed by: INTERNAL MEDICINE

## 2021-08-24 PROCEDURE — 85610 PROTHROMBIN TIME: CPT

## 2021-08-24 PROCEDURE — 2580000003 HC RX 258: Performed by: NURSE PRACTITIONER

## 2021-08-24 PROCEDURE — 80048 BASIC METABOLIC PNL TOTAL CA: CPT

## 2021-08-24 PROCEDURE — 2500000003 HC RX 250 WO HCPCS: Performed by: INTERNAL MEDICINE

## 2021-08-24 PROCEDURE — 94761 N-INVAS EAR/PLS OXIMETRY MLT: CPT

## 2021-08-24 PROCEDURE — 93306 TTE W/DOPPLER COMPLETE: CPT

## 2021-08-24 PROCEDURE — 97530 THERAPEUTIC ACTIVITIES: CPT

## 2021-08-24 RX ADMIN — DOXYCYCLINE 100 MG: 100 INJECTION, POWDER, LYOPHILIZED, FOR SOLUTION INTRAVENOUS at 00:54

## 2021-08-24 RX ADMIN — HEPARIN SODIUM AND DEXTROSE 18 UNITS/KG/HR: 10000; 5 INJECTION INTRAVENOUS at 16:47

## 2021-08-24 RX ADMIN — FAMOTIDINE 20 MG: 20 TABLET, FILM COATED ORAL at 09:13

## 2021-08-24 RX ADMIN — CEFTRIAXONE SODIUM 1000 MG: 1 INJECTION, POWDER, FOR SOLUTION INTRAMUSCULAR; INTRAVENOUS at 13:37

## 2021-08-24 RX ADMIN — HEPARIN SODIUM 3940 UNITS: 1000 INJECTION INTRAVENOUS; SUBCUTANEOUS at 04:43

## 2021-08-24 RX ADMIN — OXYCODONE HYDROCHLORIDE AND ACETAMINOPHEN 2 TABLET: 5; 325 TABLET ORAL at 03:18

## 2021-08-24 RX ADMIN — GABAPENTIN 300 MG: 600 TABLET ORAL at 13:31

## 2021-08-24 RX ADMIN — HYDROMORPHONE HYDROCHLORIDE 0.5 MG: 1 INJECTION, SOLUTION INTRAMUSCULAR; INTRAVENOUS; SUBCUTANEOUS at 04:49

## 2021-08-24 RX ADMIN — SODIUM CHLORIDE, PRESERVATIVE FREE 10 ML: 5 INJECTION INTRAVENOUS at 16:25

## 2021-08-24 RX ADMIN — VANCOMYCIN HYDROCHLORIDE 1250 MG: 10 INJECTION, POWDER, LYOPHILIZED, FOR SOLUTION INTRAVENOUS at 21:06

## 2021-08-24 RX ADMIN — OXYCODONE HYDROCHLORIDE AND ACETAMINOPHEN 2 TABLET: 5; 325 TABLET ORAL at 19:16

## 2021-08-24 RX ADMIN — SODIUM CHLORIDE: 9 INJECTION, SOLUTION INTRAVENOUS at 16:17

## 2021-08-24 RX ADMIN — HYDROMORPHONE HYDROCHLORIDE 0.5 MG: 1 INJECTION, SOLUTION INTRAMUSCULAR; INTRAVENOUS; SUBCUTANEOUS at 09:19

## 2021-08-24 RX ADMIN — LEVOTHYROXINE SODIUM 175 MCG: 175 TABLET ORAL at 09:14

## 2021-08-24 RX ADMIN — OXYCODONE HYDROCHLORIDE AND ACETAMINOPHEN 2 TABLET: 5; 325 TABLET ORAL at 11:10

## 2021-08-24 RX ADMIN — HYDROMORPHONE HYDROCHLORIDE 0.5 MG: 1 INJECTION, SOLUTION INTRAMUSCULAR; INTRAVENOUS; SUBCUTANEOUS at 13:24

## 2021-08-24 RX ADMIN — SODIUM CHLORIDE, PRESERVATIVE FREE 10 ML: 5 INJECTION INTRAVENOUS at 13:25

## 2021-08-24 RX ADMIN — SODIUM CHLORIDE, PRESERVATIVE FREE 10 ML: 5 INJECTION INTRAVENOUS at 09:19

## 2021-08-24 RX ADMIN — HEPARIN SODIUM AND DEXTROSE 16 UNITS/KG/HR: 10000; 5 INJECTION INTRAVENOUS at 03:15

## 2021-08-24 RX ADMIN — DOXYCYCLINE 100 MG: 100 INJECTION, POWDER, LYOPHILIZED, FOR SOLUTION INTRAVENOUS at 13:29

## 2021-08-24 RX ADMIN — GABAPENTIN 300 MG: 600 TABLET ORAL at 09:13

## 2021-08-24 RX ADMIN — FAMOTIDINE 20 MG: 20 TABLET, FILM COATED ORAL at 21:14

## 2021-08-24 RX ADMIN — SODIUM CHLORIDE, PRESERVATIVE FREE 10 ML: 5 INJECTION INTRAVENOUS at 21:02

## 2021-08-24 RX ADMIN — HYDROMORPHONE HYDROCHLORIDE 0.5 MG: 1 INJECTION, SOLUTION INTRAMUSCULAR; INTRAVENOUS; SUBCUTANEOUS at 16:25

## 2021-08-24 RX ADMIN — GABAPENTIN 300 MG: 600 TABLET ORAL at 21:14

## 2021-08-24 RX ADMIN — CEFEPIME HYDROCHLORIDE 2000 MG: 2 INJECTION, POWDER, FOR SOLUTION INTRAVENOUS at 21:01

## 2021-08-24 RX ADMIN — SODIUM CHLORIDE: 9 INJECTION, SOLUTION INTRAVENOUS at 03:21

## 2021-08-24 RX ADMIN — HYDROMORPHONE HYDROCHLORIDE 0.5 MG: 1 INJECTION, SOLUTION INTRAMUSCULAR; INTRAVENOUS; SUBCUTANEOUS at 00:54

## 2021-08-24 RX ADMIN — HYDROMORPHONE HYDROCHLORIDE 0.5 MG: 1 INJECTION, SOLUTION INTRAMUSCULAR; INTRAVENOUS; SUBCUTANEOUS at 21:16

## 2021-08-24 ASSESSMENT — PAIN DESCRIPTION - ORIENTATION
ORIENTATION: LEFT

## 2021-08-24 ASSESSMENT — PAIN SCALES - GENERAL
PAINLEVEL_OUTOF10: 10
PAINLEVEL_OUTOF10: 8
PAINLEVEL_OUTOF10: 9
PAINLEVEL_OUTOF10: 10
PAINLEVEL_OUTOF10: 8
PAINLEVEL_OUTOF10: 10

## 2021-08-24 ASSESSMENT — PAIN DESCRIPTION - ONSET
ONSET: ON-GOING
ONSET: ON-GOING

## 2021-08-24 ASSESSMENT — PAIN DESCRIPTION - LOCATION
LOCATION: FOOT

## 2021-08-24 ASSESSMENT — PAIN DESCRIPTION - PAIN TYPE
TYPE: ACUTE PAIN

## 2021-08-24 ASSESSMENT — PAIN DESCRIPTION - FREQUENCY
FREQUENCY: CONTINUOUS

## 2021-08-24 ASSESSMENT — PAIN DESCRIPTION - DESCRIPTORS
DESCRIPTORS: THROBBING
DESCRIPTORS: BURNING
DESCRIPTORS: BURNING

## 2021-08-24 ASSESSMENT — PAIN DESCRIPTION - PROGRESSION
CLINICAL_PROGRESSION: NOT CHANGED
CLINICAL_PROGRESSION: NOT CHANGED

## 2021-08-24 NOTE — PROGRESS NOTES
[facility]    Progress Note    8/24/2021    9:58 AM    Name:   Thomas Fields  MRN:     [id number]     [place]:      [de-identified]   Room:   2001/2001-01  IP Day:  1  Admit Date:  8/23/2021  9:19 AM    PCP:   Tyesha Rangel  Code Status:  Full Code    Subjective:     C/C: Left foot pain     Interval History Status: not changed. Patient was seen and examined at bedside this AM. Continues to endorse severe left foot pain. Denies fever/chills, nausea/vomiting, shortness of breath or chest pain. Plan for echocardiogram today. Brief History:     (per HPI) Thomas Fields is a 35 y.o. Non- / non  male who presents with left foot pain, swelling and discolaration and is admitted to the hospital for the management of Arterial embolism ([address]    Patient is a 35 yr old  male with a [facility] thyroid cancer and tobacco abuse who presented to an outside facility with left foot pain, swelling and discolored toes. He has had issues with blue toes of his right foot before in the past.  He was seen in [facility] and started on a Heparin gtt a few years ago. The discoloration improved and the pain lessened. He was then diagnosed with thyroid cancer about 2 years ago and had 3 open neck surgeries to remove his thyroid gland and 31 lymph nodes. He did have some lymph node spread, and then underwent 3-4 radiation treatments. He follows with ENT, Dr. Marichuy Garcia.       Patient smokes about 10 cigs a day. He has stress and has been unable to quick smoking. He has had pain and swelling to the 3rd left digit for a few months, he was told it might be infected, no drainage. He denies any fevers. His 2nd left toe has turned dusky over the last few days and hurts tremendously. No sob, chest pain or swelling of his legs.   He now notices left foot swelling, and redness on the top of his foot.       He was transferred on a Heparin gtt and has a consult to Vascular surgery. He does admit to missing a few doses of Eliquis. Review of Systems:     Constitutional:  negative for chills, fevers, sweats  Respiratory:  negative for cough, dyspnea on exertion, shortness of breath, wheezing  Cardiovascular:  negative for chest pain, chest pressure/discomfort, lower extremity edema, palpitations  Gastrointestinal:  negative for abdominal pain, constipation, diarrhea, nausea, vomiting  Neurological:  negative for dizziness, headache    Medications: Allergies:  No Known Allergies    Current Meds:   Scheduled Meds:    aspirin  81 mg Oral Daily    atorvastatin  80 mg Oral Daily    calcium carbonate  500 mg Oral Daily    Vitamin D  2,000 Units Oral Daily    levothyroxine  175 mcg Oral Daily    sodium chloride flush  5-40 mL Intravenous 2 times per day    doxycycline (VIBRAMYCIN) IV  100 mg Intravenous Q12H    cefTRIAXone (ROCEPHIN) IV  1,000 mg Intravenous Q24H    famotidine  20 mg Oral BID    gabapentin  300 mg Oral TID     Continuous Infusions:    sodium chloride      heparin (PORCINE) Infusion 18 Units/kg/hr (08/24/21 0446)    sodium chloride 75 mL/hr at 08/24/21 0321     PRN Meds: sodium chloride flush, sodium chloride, potassium chloride **OR** potassium alternative oral replacement **OR** potassium chloride, magnesium sulfate, ondansetron **OR** ondansetron, polyethylene glycol, acetaminophen **OR** acetaminophen, heparin (porcine), heparin (porcine), HYDROmorphone, oxyCODONE-acetaminophen    Data:     Past Medical History:   has a past medical history of Headache and Hypothyroidism. Social History:   reports that he has been smoking.  He has never used smokeless tobacco.     Family History:   Family History   Problem Relation Age of Onset    Cancer Mother     High Blood Pressure Father     Asthma Brother        Vitals:  BP (!) 143/88   Pulse 52   Temp 98.7 °F (37.1 °C) (Oral)   Resp 13   Ht 6' 1\" (1.854 m)   Wt 221 lb 3.2 oz (100.3 kg)   SpO2 95%   BMI 29.18 kg/m²   Temp (24hrs), Av.5 °F (36.9 °C), Min:98.1 °F (36.7 °C), Max:98.7 °F (37.1 °C)    No results for input(s): POCGLU in the last 72 hours. I/O (24Hr): Intake/Output Summary (Last 24 hours) at 2021 0958  Last data filed at 2021 0451  Gross per 24 hour   Intake 3014.9 ml   Output 1640 ml   Net 1374.9 ml       Labs:  Hematology:  Recent Labs     21  1056 21  0309   WBC 15.4* 16.0*   RBC 6.21* 5.77   HGB 18.1* 16.8   HCT 56.4* 53.1*   MCV 90.8 92.0   MCH 29.1 29.1   MCHC 32.1 31.6   RDW 16.2* 15.7*   * 579*   MPV 9.6 9.7   INR  --  1.0     Chemistry:  Recent Labs     21  1446 21  0309   * 133*   K 4.4 4.7    102   CO2 23 22   GLUCOSE 91 108*   BUN 16 16   CREATININE 0.85 1.01   ANIONGAP 11 9   LABGLOM >60 >60   GFRAA >60 >60   CALCIUM 9.2 8.3*   No results for input(s): PROT, LABALBU, LABA1C, V2ONAHL, S4QWLKB, FT4, TSH, AST, ALT, LDH, GGT, ALKPHOS, LABGGT, BILITOT, BILIDIR, AMMONIA, AMYLASE, LIPASE, LACTATE, CHOL, HDL, LDLCHOLESTEROL, CHOLHDLRATIO, TRIG, VLDL, VAE23ST, PHENYTOIN, PHENYF, URICACID, POCGLU in the last 72 hours. ABG:No results found for: POCPH, PHART, PH, POCPCO2, EIY9XDU, PCO2, POCPO2, PO2ART, PO2, POCHCO3, CFO6UNN, HCO3, NBEA, PBEA, BEART, BE, THGBART, THB, USA9CVS, GPYZ9SDP, C5NMMOHY, O2SAT, FIO2  Lab Results   Component Value Date/Time    SPECIAL LA 9 CC 2021 02:46 PM    SPECIAL LH UNK CC 2021 02:46 PM     Lab Results   Component Value Date/Time    CULTURE NO GROWTH 14 HOURS 2021 02:46 PM    CULTURE NO GROWTH 14 HOURS 2021 02:46 PM       Radiology:  XR FOOT LEFT (2 VIEWS)    Result Date: 2021  No acute osseous or soft tissue abnormality. CTA CHEST W CONTRAST    Result Date: 2021  No acute abnormality of the thoracic aorta (when taking into account mild pulsation/motion artifact). Mild secretions/debris within the trachea and bronchus intermedius.   Probable subsegmental atelectasis/scarring in the lingula. Couple of scattered tiny nodules measuring 4 mm or less. Physical Examination:        General appearance:  alert, cooperative and no distress  Mental Status:  oriented to person, place and time and normal affect  Lungs:  clear to auscultation bilaterally, normal effort  Heart:  regular rate and rhythm, no murmur  Abdomen:  soft, nontender, nondistended, normal bowel sounds, no masses, hepatomegaly, splenomegaly  Extremities:  Cyanosis appreciated in left second and third toe   Skin:  no gross lesions, rashes, induration    Assessment:        Hospital Problems         Last Modified POA    * (Principal) Arterial embolism ([facility] [address] 8/23/2021 Yes    Hypothyroidism 8/23/2021 Yes    Tobacco abuse 8/23/2021 Yes    Cellulitis of third toe of left foot 8/23/2021 Yes    Toe cyanosis 8/23/2021 Yes          Plan:        Left 2nd toe cyanosis- likely 2/2 blue toe syndrome. Vascular surgery and podiatry following. Continue heparin infusion. Echocardiogram today. Encouraged smoking cessation. Continue aspirin. Continue doxycycline and ceftriaxone for possible cellulitis. Continue PRN hydromorphone and Percocet for breakthrough pain. Thyroid cancer s/p thyroidectomy -Continue levothyroxine 175 mcg daily   Hyperlipidemia -Continue atorvastatin 80 mg daily   Neuropathic pain -Continue gabapentin   Tobacco abuse -Encouraged cessation.      Margarita Castro MD  8/24/2021  9:58 AM

## 2021-08-24 NOTE — PROGRESS NOTES
Pharmacy Note  Vancomycin Consult    Shin Johnson is a 35 y.o. male started on Vancomycin for Skin and Soft Tissue Infection; consult received from Dr. Patsy Silva to manage therapy. Also receiving the following antibiotics: Cefepime. Patient Active Problem List   Diagnosis    Thyroid cancer ([facility] [address]    Hypothyroidism    Papillary thyroid carcinoma ([facility] [address]    Arterial embolism (HCC)    Tobacco abuse    Cellulitis of third toe of left foot    Toe cyanosis     Allergies:  Patient has no known allergies. Temp max: 97.7    Recent Labs     08/23/21  1056 08/23/21  1446 08/24/21  0309   BUN  --  16 16   CREATININE  --  0.85 1.01   WBC 15.4*  --  16.0*       Intake/Output Summary (Last 24 hours) at 8/24/2021 1906  Last data filed at 8/24/2021 1902  Gross per 24 hour   Intake 3703.4 ml   Output 1630 ml   Net 2073.4 ml     Culture Date      Source                       Results  Pending    Ht Readings from Last 1 Encounters:   08/23/21 6' 1\" (1.854 m)        Wt Readings from Last 1 Encounters:   08/24/21 221 lb 3.2 oz (100.3 kg)       Body mass index is 29.18 kg/m². Estimated Creatinine Clearance: 118 mL/min (based on SCr of 1.01 mg/dL). Goal Trough Level: 10-15 mcg/mL  AUC goal = 400-600  Assessment/Plan:  Will initiate Vancomycin 1250 mg IV every 12 hours. Timing of trough level will be determined based on culture results, renal function, and clinical response. Estimated trough = 15.6 and AUC of 496. Thank you for the consult. Will continue to follow.     Juhi Marrerochure Pharm D.  8/24/2021  7:08 PM

## 2021-08-24 NOTE — PROGRESS NOTES
[facility]up[facility] [address] [email or web address]  Occupational Therapy Not Seen Note    DATE: 2021  Name: Louise Davies  : 1980  MRN: [id number]    Patient not available for Occupational Therapy due to:    [x] Pt is modified independent with functional mobility and functional tasks. Pt with no OT acute care needs at this time, will defer OT eval. Re-order OT services if pt has a amputation or functionality changes during this hospital stay.       Next Scheduled Treatment: N/A    Shyla Morrison OT OTR/L

## 2021-08-24 NOTE — PROGRESS NOTES
Progress Note  Podiatric Medicine and Surgery     Subjective     CC: Left foot pain and discoloration    Patient seen and examined at bedside. No acute events overnight. Vital signs stable   WBC 15.4 -> 16.0   Patient states that his left 3rd toenail fell off this morning. HPI :  Jaida Perez is a 35 y.o. male seen at [facility] for left foot pain, swelling and discoloration to the second and third digit of the left foot and right fourth digit. Patient has a past medical history of thyroid cancer and tobacco abuse. Patient states that he has had issues with blue toes on his right foot in the past when he was getting treated for the thyroid cancer in 2016. Patient states that his discoloration improved when he received blood thinner at [facility] a few years ago. At that time he was told that the discoloration occurred due to small clots in his vessels of his right foot. Today he presents with discoloration on the left second and third digit and right fourth digit. Patient states that he has not seen a podiatrist in the past.  Patient states that he has more of a burning and tingling sensation to his left second and third digit. Patient denies any other pedal complaints at this time. ROS: Denies N/V/F/C/SOB/CP. Otherwise negative except at stated in the HPI.      Medications:  Scheduled Meds:   aspirin  81 mg Oral Daily    atorvastatin  80 mg Oral Daily    calcium carbonate  500 mg Oral Daily    Vitamin D  2,000 Units Oral Daily    levothyroxine  175 mcg Oral Daily    sodium chloride flush  5-40 mL IntraVENous 2 times per day    doxycycline (VIBRAMYCIN) IV  100 mg IntraVENous Q12H    cefTRIAXone (ROCEPHIN) IV  1,000 mg IntraVENous Q24H    famotidine  20 mg Oral BID    gabapentin  300 mg Oral TID       Continuous Infusions:   sodium chloride      heparin (PORCINE) Infusion 18 Units/kg/hr (08/24/21 8196)    sodium chloride 75 mL/hr at 08/24/21 0321       PRN Meds:sodium chloride flush, sodium chloride, potassium chloride **OR** potassium alternative oral replacement **OR** potassium chloride, magnesium sulfate, ondansetron **OR** ondansetron, polyethylene glycol, acetaminophen **OR** acetaminophen, heparin (porcine), heparin (porcine), HYDROmorphone, oxyCODONE-acetaminophen    Objective     Vitals:  Patient Vitals for the past 8 hrs:   BP Temp Temp src Pulse Resp   21 1200 132/86 97.7 °F (36.5 °C) Oral 64 16   21 0907 (!) 143/88 98.7 °F (37.1 °C) Oral -- --     Average, Min, and Max for last 24 hours Vitals:  TEMPERATURE:  Temp  Av.3 °F (36.8 °C)  Min: 97.7 °F (36.5 °C)  Max: 98.7 °F (37.1 °C)    RESPIRATIONS RANGE: Resp  Av.7  Min: 12  Max: 16    PULSE RANGE: Pulse  Av.8  Min: 52  Max: 64    BLOOD PRESSURE RANGE:  Systolic (25DEB), BN , Min:131 , LET:437   ; Diastolic (96SMM), WHV:12, Min:86, Max:89      PULSE OXIMETRY RANGE: SpO2  Av %  Min: 95 %  Max: 95 %    I/O last 3 completed shifts: In: 3014.9 [P.O.:1320; I.V.:1694.9]  Out: 1640 [Urine:1640]    CBC:  Recent Labs     21  1056 21  0309   WBC 15.4* 16.0*   HGB 18.1* 16.8   HCT 56.4* 53.1*   * 579*        BMP:  Recent Labs     21  1446 21  0309   * 133*   K 4.4 4.7    102   CO2 23 22   BUN 16 16   CREATININE 0.85 1.01   GLUCOSE 91 108*   CALCIUM 9.2 8.3*        Coags:  Recent Labs     21  1727 21  0309 21  0959   APTT 85.2* 40.5* 60.6*   INR  --  1.0  --        No results found for: SEDRATE  No results for input(s): CRP in the last 72 hours. Lower Extremity Physical Exam:  Vascular: DP and PT pulses are palpable Bilateral. CFT <3 seconds to digits 1-3 and 5 on the right foot, 1, 4 and 5 digits on the left foot. Cyanotic left second and discolored third digit.       Neuro: Light touch sensation is intact to the level of the digits, Bilateral.     Musculoskeletal: Muscle strength is 5/5 to all lower extremity muscle groups, Bilateral.  Pain on palpation to second and third digit, left foot. Gross deformity is absent    Dermatologic: Cyanosis of the second and discoloration of the third digit, left foot appreciated. Increased cyanosis from previous physical exam noted to the dorsal aspect of the right fourth digit. Nails 1-5 normatrophic on the right foot and nails 1, 3 and 5 are normatrophic while nails 2 and 3 are discolored on the left foot. Ecchymosis to plantar aspect of the left foot. Malodor appreciated from left foot. Erythema noted to left foot and to ankle. No fluctuance, crepitus, or induration. Interdigital maceration absent, Bilateral.       Clinical Images:                  Imaging:   CTA CHEST W CONTRAST   Final Result   No acute abnormality of the thoracic aorta (when taking into account mild   pulsation/motion artifact). Mild secretions/debris within the trachea and bronchus intermedius. Probable   subsegmental atelectasis/scarring in the lingula. Couple of scattered tiny nodules measuring 4 mm or less. RECOMMENDATIONS:   Multiple pulmonary nodules. Most severe: 4 mm right solid pulmonary nodule   within the upper lobe. If patient is low risk for malignancy, no routine   follow-up imaging is recommended; if patient is high risk for malignancy, a   non-contrast Chest CT at 12 months is optional. If performed and the nodule   is stable at 12 months, no further follow-up is recommended. These guidelines do not apply to immunocompromised patients and patients with   cancer. Follow up in patients with significant comorbidities as clinically   warranted. For lung cancer screening, adhere to Lung-RADS guidelines. Reference: Radiology. 2017; 284(1):228-43. XR FOOT LEFT (2 VIEWS)   Final Result   No acute osseous or soft tissue abnormality.              Cultures: None    Assessment   Beverely Dancer is a 35 y.o. male with   Acute limb ischemia secondary to micro emboli event, left lower extremity  Tobacco abuse  History of thyroid cancer  History of chemotherapy    Principal Problem:    Arterial embolism (HCC)  Active Problems:    Hypothyroidism    Tobacco abuse    Cellulitis of third toe of left foot    Toe cyanosis  Resolved Problems:    History of deep vein thrombosis of lower extremity       Plan     Patient examined and evaluated at bedside   Treatment options discussed in detail with the patient  Internal medicine is as primary for medical management. At this time no surgical intervention planned from podiatry standpoint. There is low suspicion of infection process of the left foot and no acute osseous abnormality appreciated on radiographs. Given the overall clinical picture patient has acute limb ischemia likely secondary to a microembolic event of the left foot. Agree with vascular on hypercoagulability studies. Continue Percocet for ischemic pain. Podiatry will sign off at this time. Thank you for the consult. Please [facility] podiatry resident on call with any questions or concerns. Discussed with Dr. Toth Held as tolerated to left foot    Isael Hogue DPM   Podiatric Medicine & Surgery   8/24/2021 at 2:01 PM     Senior Resident Statement:  I have discussed the case, including pertinent history and exam findings with the resident. I agree with the assessment, plan and orders as documented above. Electronically signed by Soo Gambino DPM on 8/24/2021 at 7:59 PM    I performed a history and physical examination of the patient and discussed management with the resident. I reviewed the residents note and agree with the documented findings and plan of care. Any areas of disagreement are noted on the chart. I was personally present for the key portions of any procedures. I have documented in the chart those procedures where I was not present during the key portions. I have reviewed the Podiatry Resident progress note.  I agree with the chief complaint, past medical history,

## 2021-08-24 NOTE — PLAN OF CARE
Problem: Pain:  Goal: Pain level will decrease  Description: Pain level will decrease  8/24/2021 0743 by Sang Alcazar RN  Outcome: Ongoing  8/24/2021 0512 by Cole Hutson RN  Outcome: Ongoing     Problem: Pain:  Goal: Control of acute pain  Description: Control of acute pain  8/24/2021 0743 by Sang Alcazar RN  Outcome: Ongoing  8/24/2021 0512 by Cole Hutson RN  Outcome: Ongoing     Problem: Pain:  Goal: Control of chronic pain  Description: Control of chronic pain  8/24/2021 0512 by Cole Hutson RN  Outcome: Ongoing     Problem: Falls - Risk of:  Goal: Will remain free from falls  Description: Will remain free from falls  8/24/2021 0512 by Cole Hutson RN  Outcome: Ongoing     Will continue to monitor patient

## 2021-08-24 NOTE — PROGRESS NOTES
Physical Therapy    Facility/Department: [facility] CAR 2  Initial Assessment    NAME: Elbert Tavarez  : 1980  MRN: [id number]    No chief complaint on file.  -L foot pain    Date of Service: 2021    Discharge Recommendations:    No further therapy required at discharge. PT Equipment Recommendations  Equipment Needed: Yes  Mobility Devices: Kota Champagne: Rolling    Assessment   Body structures, Functions, Activity limitations: Decreased functional mobility   Assessment: Pt amb 250' CGA improving to Benedict, cues to walk flat footed, using RW d/t pain with WB LLE. Pt without acute PT needs, physical therapy to sign off. Prognosis: Good  Decision Making: Low Complexity  PT Education: Plan of Care;PT Role;General Safety  REQUIRES PT FOLLOW UP: No  Activity Tolerance  Activity Tolerance: Patient Tolerated treatment well       Patient Diagnosis(es): There were no encounter diagnoses. has a past medical history of Headache and Hypothyroidism. has a past surgical history that includes Total Thyroidectomy (Bilateral, 2017). Restrictions  Restrictions/Precautions  Restrictions/Precautions: General Precautions  Required Braces or Orthoses?: No  Position Activity Restriction  Other position/activity restrictions: LLE WBAT  Vision/Hearing  Vision: Within Functional Limits  Hearing: Within functional limits     Subjective  General  Chart Reviewed: Yes  Patient assessed for rehabilitation services?: Yes  Response To Previous Treatment: Not applicable  Family / Caregiver Present: Yes  Follows Commands: Within Functional Limits  Subjective  Subjective: RN and pt agreeable to PT. Pt alert in bed upon arrival.  Pain Screening  Patient Currently in Pain: Yes  Pain Assessment  Pain Assessment: 0-10  Pain Level: 8  Pain Type: Acute pain  Pain Location: Foot  Pain Orientation: Left  Non-Pharmaceutical Pain Intervention(s): Ambulation/Increased Activity; Distraction; Emotional support  Response to Pain Intervention: Patient Satisfied  Vital Signs  Patient Currently in Pain: Yes  Pre Treatment Pain Screening  Intervention List: Patient able to continue with treatment    Orientation  Orientation  Overall Orientation Status: Within Functional Limits  Social/Functional History  Social/Functional History  Lives With:  (mother)  Type of Home: House  Home Layout: One level  Home Access: Ramped entrance  Bathroom Shower/Tub: Walk-in shower  [address] [place]: Grab bars in shower (able to shower standing)  Bathroom Accessibility: [facility]: [address] [place] [address] Street: [address]: Independent  Homemaking Responsibilities: Yes (do own stuff)  Ambulation Assistance: Independent  Transfer Assistance: Independent  Active : Yes  Mode of Transportation: Car  Occupation: Part time employment, On disability  Type of occupation: seasonal work, done for season. Objective          AROM RLE (degrees)  RLE AROM: WFL  RLE General AROM: .  AROM LLE (degrees)  LLE AROM : WFL  LLE General AROM: painful L toes  AROM RUE (degrees)  RUE AROM : WFL  AROM LUE (degrees)  LUE AROM : WFL  Strength RLE  Strength RLE: WFL  Strength LLE  Strength LLE: WFL  Strength RUE  Strength RUE: WFL  Strength LUE  Strength LUE: WFL     Sensation  Overall Sensation Status: Impaired (c/o numbness/ tingling L foot, chronic)  Bed mobility  Supine to Sit: Independent  Sit to Supine: Independent  Scooting: Independent  Transfers  Sit to Stand: Contact guard assistance  Stand to sit: Stand by assistance  Ambulation  Ambulation?: Yes  Ambulation 1  Surface: level tile  Device: Rolling Walker; No Device  Assistance: Contact guard assistance;Modified Independent (CGA initially improving to Benedict)  Quality of Gait: minorly slowed marily, no LOB or buckling,  Distance: 250  Comments: pt tending to walk on lateral aspect of L foot/ L ankle inverted. Pt stated d/t pain. encouraged to WB flat on foot, using RW if too painful. Pt stated understanding. Balance  Posture: Good  Sitting - Static: Good  Sitting - Dynamic: Good  Standing - Static: Good  Standing - Dynamic: Good;-  Comments: RW used while assessing standing balance        Plan   Plan  Times per week: d/c PT  Safety Devices  Type of devices: Call light within reach, Nurse notified, Gait belt, Left in bed, All fall risk precautions in place  Restraints  Initially in place: No    AM-PAC Score  AM-PAC Inpatient Mobility Raw Score : 24 (08/24/21 1413)  AM-PAC Inpatient T-Scale Score : 61.14 (08/24/21 1413)  Mobility Inpatient CMS 0-100% Score: 0 (08/24/21 1413)  Mobility Inpatient CMS G-Code Modifier : [address] (08/24/21 1413)          Goals  Short term goals  Time Frame for Short term goals: d/c PT       Therapy Time   Individual Concurrent Group Co-treatment   Time In 1042         Time Out 1101         Minutes 19         Timed Code Treatment Minutes: 8 Minutes       Josephine Belts, PT

## 2021-08-25 ENCOUNTER — APPOINTMENT (OUTPATIENT)
Dept: CT IMAGING | Age: 41
DRG: 300 | End: 2021-08-25
Attending: STUDENT IN AN ORGANIZED HEALTH CARE EDUCATION/TRAINING PROGRAM
Payer: MEDICARE

## 2021-08-25 LAB
ANION GAP SERPL CALCULATED.3IONS-SCNC: 10 MMOL/L (ref 9–17)
BUN BLDV-MCNC: 10 MG/DL (ref 6–20)
BUN/CREAT BLD: ABNORMAL (ref 9–20)
CALCIUM SERPL-MCNC: 8.4 MG/DL (ref 8.6–10.4)
CHLORIDE BLD-SCNC: 101 MMOL/L (ref 98–107)
CO2: 21 MMOL/L (ref 20–31)
CREAT SERPL-MCNC: 0.82 MG/DL (ref 0.7–1.2)
GFR AFRICAN AMERICAN: >60 ML/MIN
GFR NON-AFRICAN AMERICAN: >60 ML/MIN
GFR SERPL CREATININE-BSD FRML MDRD: ABNORMAL ML/MIN/{1.73_M2}
GFR SERPL CREATININE-BSD FRML MDRD: ABNORMAL ML/MIN/{1.73_M2}
GLUCOSE BLD-MCNC: 105 MG/DL (ref 70–99)
HCT VFR BLD CALC: 50.6 % (ref 40.7–50.3)
HEMOGLOBIN: 16.2 G/DL (ref 13–17)
MCH RBC QN AUTO: 28.7 PG (ref 25.2–33.5)
MCHC RBC AUTO-ENTMCNC: 32 G/DL (ref 28.4–34.8)
MCV RBC AUTO: 89.7 FL (ref 82.6–102.9)
NRBC AUTOMATED: 0 PER 100 WBC
PARTIAL THROMBOPLASTIN TIME: 55.1 SEC (ref 20.5–30.5)
PARTIAL THROMBOPLASTIN TIME: 75.4 SEC (ref 20.5–30.5)
PARTIAL THROMBOPLASTIN TIME: 97 SEC (ref 20.5–30.5)
PDW BLD-RTO: 15.5 % (ref 11.8–14.4)
PLATELET # BLD: 576 K/UL (ref 138–453)
PMV BLD AUTO: 10.1 FL (ref 8.1–13.5)
POTASSIUM SERPL-SCNC: 4 MMOL/L (ref 3.7–5.3)
RBC # BLD: 5.64 M/UL (ref 4.21–5.77)
SODIUM BLD-SCNC: 132 MMOL/L (ref 135–144)
WBC # BLD: 14.7 K/UL (ref 3.5–11.3)

## 2021-08-25 PROCEDURE — 6370000000 HC RX 637 (ALT 250 FOR IP): Performed by: INTERNAL MEDICINE

## 2021-08-25 PROCEDURE — 6370000000 HC RX 637 (ALT 250 FOR IP): Performed by: EMERGENCY MEDICINE

## 2021-08-25 PROCEDURE — 94760 N-INVAS EAR/PLS OXIMETRY 1: CPT

## 2021-08-25 PROCEDURE — 99231 SBSQ HOSP IP/OBS SF/LOW 25: CPT | Performed by: STUDENT IN AN ORGANIZED HEALTH CARE EDUCATION/TRAINING PROGRAM

## 2021-08-25 PROCEDURE — 6360000004 HC RX CONTRAST MEDICATION: Performed by: STUDENT IN AN ORGANIZED HEALTH CARE EDUCATION/TRAINING PROGRAM

## 2021-08-25 PROCEDURE — 6360000002 HC RX W HCPCS: Performed by: STUDENT IN AN ORGANIZED HEALTH CARE EDUCATION/TRAINING PROGRAM

## 2021-08-25 PROCEDURE — 2060000000 HC ICU INTERMEDIATE R&B

## 2021-08-25 PROCEDURE — 85730 THROMBOPLASTIN TIME PARTIAL: CPT

## 2021-08-25 PROCEDURE — 85027 COMPLETE CBC AUTOMATED: CPT

## 2021-08-25 PROCEDURE — 2580000003 HC RX 258: Performed by: STUDENT IN AN ORGANIZED HEALTH CARE EDUCATION/TRAINING PROGRAM

## 2021-08-25 PROCEDURE — 6360000002 HC RX W HCPCS: Performed by: NURSE PRACTITIONER

## 2021-08-25 PROCEDURE — 6370000000 HC RX 637 (ALT 250 FOR IP): Performed by: NURSE PRACTITIONER

## 2021-08-25 PROCEDURE — 73706 CT ANGIO LWR EXTR W/O&W/DYE: CPT

## 2021-08-25 PROCEDURE — 6360000002 HC RX W HCPCS: Performed by: INTERNAL MEDICINE

## 2021-08-25 PROCEDURE — 2580000003 HC RX 258: Performed by: NURSE PRACTITIONER

## 2021-08-25 PROCEDURE — 36415 COLL VENOUS BLD VENIPUNCTURE: CPT

## 2021-08-25 PROCEDURE — 80048 BASIC METABOLIC PNL TOTAL CA: CPT

## 2021-08-25 PROCEDURE — 6370000000 HC RX 637 (ALT 250 FOR IP): Performed by: STUDENT IN AN ORGANIZED HEALTH CARE EDUCATION/TRAINING PROGRAM

## 2021-08-25 RX ORDER — OXYCODONE HYDROCHLORIDE AND ACETAMINOPHEN 5; 325 MG/1; MG/1
2 TABLET ORAL EVERY 4 HOURS PRN
Status: DISCONTINUED | OUTPATIENT
Start: 2021-08-25 | End: 2021-08-26

## 2021-08-25 RX ORDER — MORPHINE SULFATE 2 MG/ML
2 INJECTION, SOLUTION INTRAMUSCULAR; INTRAVENOUS
Status: DISCONTINUED | OUTPATIENT
Start: 2021-08-25 | End: 2021-08-26

## 2021-08-25 RX ORDER — MORPHINE SULFATE 4 MG/ML
4 INJECTION, SOLUTION INTRAMUSCULAR; INTRAVENOUS
Status: DISCONTINUED | OUTPATIENT
Start: 2021-08-25 | End: 2021-08-26

## 2021-08-25 RX ADMIN — GABAPENTIN 300 MG: 600 TABLET ORAL at 13:53

## 2021-08-25 RX ADMIN — OXYCODONE HYDROCHLORIDE AND ACETAMINOPHEN 2 TABLET: 5; 325 TABLET ORAL at 15:01

## 2021-08-25 RX ADMIN — HYDROMORPHONE HYDROCHLORIDE 0.5 MG: 1 INJECTION, SOLUTION INTRAMUSCULAR; INTRAVENOUS; SUBCUTANEOUS at 03:13

## 2021-08-25 RX ADMIN — GABAPENTIN 300 MG: 600 TABLET ORAL at 23:02

## 2021-08-25 RX ADMIN — HEPARIN SODIUM 3940 UNITS: 1000 INJECTION INTRAVENOUS; SUBCUTANEOUS at 12:02

## 2021-08-25 RX ADMIN — MORPHINE SULFATE 4 MG: 4 INJECTION INTRAVENOUS at 21:03

## 2021-08-25 RX ADMIN — SODIUM CHLORIDE, PRESERVATIVE FREE 10 ML: 5 INJECTION INTRAVENOUS at 21:03

## 2021-08-25 RX ADMIN — HEPARIN SODIUM AND DEXTROSE 20 UNITS/KG/HR: 10000; 5 INJECTION INTRAVENOUS at 06:16

## 2021-08-25 RX ADMIN — CEFEPIME HYDROCHLORIDE 2000 MG: 2 INJECTION, POWDER, FOR SOLUTION INTRAVENOUS at 08:59

## 2021-08-25 RX ADMIN — FAMOTIDINE 20 MG: 20 TABLET, FILM COATED ORAL at 08:57

## 2021-08-25 RX ADMIN — HYDROMORPHONE HYDROCHLORIDE 0.5 MG: 1 INJECTION, SOLUTION INTRAMUSCULAR; INTRAVENOUS; SUBCUTANEOUS at 12:18

## 2021-08-25 RX ADMIN — SODIUM CHLORIDE, PRESERVATIVE FREE 10 ML: 5 INJECTION INTRAVENOUS at 08:55

## 2021-08-25 RX ADMIN — GABAPENTIN 300 MG: 600 TABLET ORAL at 08:57

## 2021-08-25 RX ADMIN — OXYCODONE HYDROCHLORIDE AND ACETAMINOPHEN 2 TABLET: 5; 325 TABLET ORAL at 19:00

## 2021-08-25 RX ADMIN — HYDROMORPHONE HYDROCHLORIDE 0.5 MG: 1 INJECTION, SOLUTION INTRAMUSCULAR; INTRAVENOUS; SUBCUTANEOUS at 08:50

## 2021-08-25 RX ADMIN — LEVOTHYROXINE SODIUM 175 MCG: 175 TABLET ORAL at 08:58

## 2021-08-25 RX ADMIN — VANCOMYCIN HYDROCHLORIDE 1250 MG: 10 INJECTION, POWDER, LYOPHILIZED, FOR SOLUTION INTRAVENOUS at 09:56

## 2021-08-25 RX ADMIN — SODIUM CHLORIDE, PRESERVATIVE FREE 10 ML: 5 INJECTION INTRAVENOUS at 17:59

## 2021-08-25 RX ADMIN — IOPAMIDOL 125 ML: 755 INJECTION, SOLUTION INTRAVENOUS at 02:21

## 2021-08-25 RX ADMIN — OXYCODONE HYDROCHLORIDE AND ACETAMINOPHEN 2 TABLET: 5; 325 TABLET ORAL at 10:00

## 2021-08-25 RX ADMIN — HYDROMORPHONE HYDROCHLORIDE 0.5 MG: 1 INJECTION, SOLUTION INTRAMUSCULAR; INTRAVENOUS; SUBCUTANEOUS at 17:58

## 2021-08-25 RX ADMIN — OXYCODONE HYDROCHLORIDE AND ACETAMINOPHEN 2 TABLET: 5; 325 TABLET ORAL at 01:50

## 2021-08-25 ASSESSMENT — PAIN SCALES - GENERAL
PAINLEVEL_OUTOF10: 10
PAINLEVEL_OUTOF10: 0

## 2021-08-25 ASSESSMENT — PAIN DESCRIPTION - PAIN TYPE: TYPE: ACUTE PAIN

## 2021-08-25 ASSESSMENT — PAIN DESCRIPTION - FREQUENCY: FREQUENCY: CONTINUOUS

## 2021-08-25 ASSESSMENT — PAIN DESCRIPTION - LOCATION: LOCATION: FOOT

## 2021-08-25 ASSESSMENT — PAIN DESCRIPTION - DESCRIPTORS: DESCRIPTORS: BURNING;POUNDING;PRESSURE

## 2021-08-25 NOTE — PLAN OF CARE
Problem: Falls - Risk of:  Goal: Will remain free from falls  Description: Will remain free from falls  Outcome: Met This Shift  Goal: Absence of physical injury  Description: Absence of physical injury  Outcome: Met This Shift     Ongoing left foot pain, left second toe is black necrotic in appearence left third toe is red and dusky in appearance and swollen. Pain meds given per order. Patient remains free of falls. Uses urinal at bedside and uses call light appropriately.

## 2021-08-25 NOTE — PROGRESS NOTES
[facility]    Progress Note    8/25/2021    1:24 PM    Name:   Louise Davies  MRN:     [id number]     [place]:      [de-identified]   Room:   2001/2001-01  IP Day:  2  Admit Date:  8/23/2021  9:19 AM    PCP:   Baron Bueno  Code Status:  Full Code    Subjective:     C/C: Left foot pain     Interval History Status: not changed. Patient was seen and examined at bedside this AM. Continues to endorse severe left foot pain. The patient's left second toe has darkened in color and is starting to appear necrotic. Update: Spoke with both vascular surgery and podiatry this afternoon. No intervention indicated from either service at this time. Plan to continue heparin drip and continue to monitor for improvement at this time. Brief History:     (per HPI) Louise Davies is a 35 y.o. Non- / non  male who presents with left foot pain, swelling and discolaration and is admitted to the hospital for the management of Arterial embolism ([facility] [address]    Patient is a 35 yr old  male with a [facility] thyroid cancer and tobacco abuse who presented to an outside facility with left foot pain, swelling and discolored toes. He has had issues with blue toes of his right foot before in the past.  He was seen in [facility] and started on a Heparin gtt a few years ago. The discoloration improved and the pain lessened. He was then diagnosed with thyroid cancer about 2 years ago and had 3 open neck surgeries to remove his thyroid gland and 31 lymph nodes. He did have some lymph node spread, and then underwent 3-4 radiation treatments. He follows with ENT, Dr. Bee Lee.       Patient smokes about 10 cigs a day. He has stress and has been unable to quick smoking. He has had pain and swelling to the 3rd left digit for a few months, he was told it might be infected, no drainage. He denies any fevers.   His 2nd left toe has turned dusky over the last few days and hurts tremendously. No sob, chest pain or swelling of his legs. He now notices left foot swelling, and redness on the top of his foot.       He was transferred on a Heparin gtt and has a consult to Vascular surgery. He does admit to missing a few doses of Eliquis. Review of Systems:     Constitutional:  negative for chills, fevers, sweats  Respiratory:  negative for cough, dyspnea on exertion, shortness of breath, wheezing  Cardiovascular:  negative for chest pain, chest pressure/discomfort, lower extremity edema, palpitations  Gastrointestinal:  negative for abdominal pain, constipation, diarrhea, nausea, vomiting  Neurological:  negative for dizziness, headache    Medications: Allergies:  No Known Allergies    Current Meds:   Scheduled Meds:    aspirin  81 mg Oral Daily    atorvastatin  80 mg Oral Daily    calcium carbonate  500 mg Oral Daily    Vitamin D  2,000 Units Oral Daily    levothyroxine  175 mcg Oral Daily    sodium chloride flush  5-40 mL IntraVENous 2 times per day    gabapentin  300 mg Oral TID     Continuous Infusions:    sodium chloride      heparin (PORCINE) Infusion 22 Units/kg/hr (08/25/21 1200)     PRN Meds: oxyCODONE-acetaminophen, sodium chloride flush, sodium chloride, potassium chloride **OR** potassium alternative oral replacement **OR** potassium chloride, magnesium sulfate, ondansetron **OR** ondansetron, polyethylene glycol, acetaminophen **OR** acetaminophen, heparin (porcine), heparin (porcine), HYDROmorphone    Data:     Past Medical History:   has a past medical history of Headache and Hypothyroidism. Social History:   reports that he has been smoking.  He has never used smokeless tobacco.     Family History:   Family History   Problem Relation Age of Onset    Cancer Mother     High Blood Pressure Father     Asthma Brother        Vitals:  /80   Pulse 59   Temp 98.2 °F (36.8 °C) (Oral)   Resp 14   Ht 6' 1\" (1.854 m)   Wt 221 lb 8 oz (100.5 kg) SpO2 95%   BMI 29.22 kg/m²   Temp (24hrs), Av °F (36.7 °C), Min:97.6 °F (36.4 °C), Max:98.6 °F (37 °C)    No results for input(s): POCGLU in the last 72 hours. I/O (24Hr): Intake/Output Summary (Last 24 hours) at 2021 1324  Last data filed at 2021 1157  Gross per 24 hour   Intake 2122.4 ml   Output 3430 ml   Net -1307.6 ml       Labs:  Hematology:  Recent Labs     21  1056 21  0309 21  0430   WBC 15.4* 16.0* 14.7*   RBC 6.21* 5.77 5.64   HGB 18.1* 16.8 16.2   HCT 56.4* 53.1* 50.6*   MCV 90.8 92.0 89.7   MCH 29.1 29.1 28.7   MCHC 32.1 31.6 32.0   RDW 16.2* 15.7* 15.5*   * 579* 576*   MPV 9.6 9.7 10.1   INR  --  1.0  --      Chemistry:  Recent Labs     21  1446 21  0309 21  0430   * 133* 132*   K 4.4 4.7 4.0    102 101   CO2 23 22 21   GLUCOSE 91 108* 105*   BUN 16 16 10   CREATININE 0.85 1.01 0.82   ANIONGAP 11 9 10   LABGLOM >60 >60 >60   GFRAA >60 >60 >60   CALCIUM 9.2 8.3* 8.4*   No results for input(s): PROT, LABALBU, LABA1C, S0JUEUF, O1ARCYH, FT4, TSH, AST, ALT, LDH, GGT, ALKPHOS, LABGGT, BILITOT, BILIDIR, AMMONIA, AMYLASE, LIPASE, LACTATE, CHOL, HDL, LDLCHOLESTEROL, CHOLHDLRATIO, TRIG, VLDL, EAI98LP, PHENYTOIN, PHENYF, URICACID, POCGLU in the last 72 hours. ABG:No results found for: POCPH, PHART, PH, POCPCO2, GIO3QQV, PCO2, POCPO2, PO2ART, PO2, POCHCO3, OEN6UDW, HCO3, NBEA, PBEA, BEART, BE, THGBART, THB, HUR8ZUP, AEXA4EYK, Z3QNTEOV, O2SAT, FIO2  Lab Results   Component Value Date/Time    SPECIAL LA 9 CC 2021 02:46 PM    SPECIAL LH UNK CC 2021 02:46 PM     Lab Results   Component Value Date/Time    CULTURE NO GROWTH 2 DAYS 2021 02:46 PM    CULTURE NO GROWTH 2 DAYS 2021 02:46 PM       Radiology:  XR FOOT LEFT (2 VIEWS)    Result Date: 2021  No acute osseous or soft tissue abnormality.      CTA CHEST W CONTRAST    Result Date: 2021  No acute abnormality of the thoracic aorta (when taking into account mild pulsation/motion artifact). Mild secretions/debris within the trachea and bronchus intermedius. Probable subsegmental atelectasis/scarring in the lingula. Couple of scattered tiny nodules measuring 4 mm or less. Physical Examination:        General appearance:  alert, cooperative and no distress  Mental Status:  oriented to person, place and time and normal affect  Lungs:  clear to auscultation bilaterally, normal effort  Heart:  regular rate and rhythm, no murmur  Abdomen:  soft, nontender, nondistended, normal bowel sounds, no masses, hepatomegaly, splenomegaly  Extremities:  Cyanosis appreciated in left second and third toe. Left second toe appears necrotic. Skin:  no gross lesions, rashes, induration    Assessment:        Hospital Problems         Last Modified POA    * (Principal) Arterial embolism ([address] 8/23/2021 Yes    Hypothyroidism 8/23/2021 Yes    Tobacco abuse 8/23/2021 Yes    Cellulitis of third toe of left foot 8/23/2021 Yes    Toe cyanosis 8/23/2021 Yes          Plan:        Left 2nd toe cyanosis- likely 2/2 blue toe syndrome. Continue heparin drip. Stop antibiotics as there is no evidence of infection at this time. Will ask podiatry to re-evaluate the patient. Thyroid cancer s/p thyroidectomy -Continue levothyroxine 175 mcg daily   Hyperlipidemia -Continue atorvastatin 80 mg daily   Neuropathic pain -Continue gabapentin   Tobacco abuse -Encouraged cessation.      Mark Thakur MD  8/25/2021  1:24 PM

## 2021-08-25 NOTE — PLAN OF CARE
Problem: Pain:  Goal: Pain level will decrease  Description: Pain level will decrease  8/25/2021 0727 by Darwin Ramos RN  Outcome: Ongoing  8/25/2021 0648 by Maurice Palomino RN  Outcome: Not Met This Shift     Problem: Pain:  Goal: Control of acute pain  Description: Control of acute pain  8/25/2021 0727 by Darwin Ramos RN  Outcome: Ongoing  8/25/2021 0648 by Maurice Palomino RN  Outcome: Not Met This Shift     Will continue to monitor patient

## 2021-08-26 ENCOUNTER — APPOINTMENT (OUTPATIENT)
Dept: GENERAL RADIOLOGY | Age: 41
DRG: 300 | End: 2021-08-26
Attending: STUDENT IN AN ORGANIZED HEALTH CARE EDUCATION/TRAINING PROGRAM
Payer: MEDICARE

## 2021-08-26 VITALS
SYSTOLIC BLOOD PRESSURE: 146 MMHG | HEIGHT: 73 IN | RESPIRATION RATE: 16 BRPM | BODY MASS INDEX: 29.36 KG/M2 | WEIGHT: 221.5 LBS | DIASTOLIC BLOOD PRESSURE: 78 MMHG | TEMPERATURE: 97.5 F | HEART RATE: 72 BPM | OXYGEN SATURATION: 96 %

## 2021-08-26 LAB
PARTIAL THROMBOPLASTIN TIME: 35.1 SEC (ref 20.5–30.5)
VANCOMYCIN TROUGH DATE LAST DOSE: ABNORMAL
VANCOMYCIN TROUGH DOSE AMOUNT: ABNORMAL
VANCOMYCIN TROUGH TIME LAST DOSE: ABNORMAL
VANCOMYCIN TROUGH: 4.8 UG/ML (ref 10–20)

## 2021-08-26 PROCEDURE — 6370000000 HC RX 637 (ALT 250 FOR IP): Performed by: NURSE PRACTITIONER

## 2021-08-26 PROCEDURE — 6370000000 HC RX 637 (ALT 250 FOR IP): Performed by: EMERGENCY MEDICINE

## 2021-08-26 PROCEDURE — 6370000000 HC RX 637 (ALT 250 FOR IP): Performed by: STUDENT IN AN ORGANIZED HEALTH CARE EDUCATION/TRAINING PROGRAM

## 2021-08-26 PROCEDURE — 80202 ASSAY OF VANCOMYCIN: CPT

## 2021-08-26 PROCEDURE — 99238 HOSP IP/OBS DSCHRG MGMT 30/<: CPT | Performed by: STUDENT IN AN ORGANIZED HEALTH CARE EDUCATION/TRAINING PROGRAM

## 2021-08-26 PROCEDURE — 6360000002 HC RX W HCPCS: Performed by: NURSE PRACTITIONER

## 2021-08-26 PROCEDURE — 85730 THROMBOPLASTIN TIME PARTIAL: CPT

## 2021-08-26 PROCEDURE — 36415 COLL VENOUS BLD VENIPUNCTURE: CPT

## 2021-08-26 PROCEDURE — 73630 X-RAY EXAM OF FOOT: CPT

## 2021-08-26 PROCEDURE — 2580000003 HC RX 258: Performed by: NURSE PRACTITIONER

## 2021-08-26 RX ORDER — GABAPENTIN 300 MG/1
300 CAPSULE ORAL 3 TIMES DAILY
Qty: 90 CAPSULE | Refills: 0 | Status: SHIPPED | OUTPATIENT
Start: 2021-08-26 | End: 2021-10-14

## 2021-08-26 RX ORDER — OXYCODONE HYDROCHLORIDE 5 MG/1
10 TABLET ORAL EVERY 4 HOURS PRN
Status: DISCONTINUED | OUTPATIENT
Start: 2021-08-26 | End: 2021-08-26 | Stop reason: HOSPADM

## 2021-08-26 RX ORDER — POVIDONE-IODINE 7.5 MG/ML
SOLUTION TOPICAL
Qty: 50 EACH | Refills: 1 | Status: SHIPPED | OUTPATIENT
Start: 2021-08-26 | End: 2022-05-26 | Stop reason: ALTCHOICE

## 2021-08-26 RX ORDER — GAUZE BANDAGE 1"X131"
BANDAGE TOPICAL
Qty: 96 EACH | Refills: 3 | Status: SHIPPED | OUTPATIENT
Start: 2021-08-26 | End: 2022-05-26 | Stop reason: ALTCHOICE

## 2021-08-26 RX ORDER — OXYCODONE HYDROCHLORIDE 10 MG/1
10 TABLET ORAL EVERY 6 HOURS PRN
Qty: 12 TABLET | Refills: 0 | Status: SHIPPED | OUTPATIENT
Start: 2021-08-26 | End: 2021-08-29

## 2021-08-26 RX ADMIN — LEVOTHYROXINE SODIUM 175 MCG: 175 TABLET ORAL at 08:10

## 2021-08-26 RX ADMIN — SODIUM CHLORIDE, PRESERVATIVE FREE 10 ML: 5 INJECTION INTRAVENOUS at 08:11

## 2021-08-26 RX ADMIN — GABAPENTIN 300 MG: 600 TABLET ORAL at 08:10

## 2021-08-26 RX ADMIN — HEPARIN SODIUM 3940 UNITS: 1000 INJECTION INTRAVENOUS; SUBCUTANEOUS at 05:46

## 2021-08-26 RX ADMIN — ASPIRIN 81 MG: 81 TABLET, CHEWABLE ORAL at 08:10

## 2021-08-26 RX ADMIN — Medication 2000 UNITS: at 08:11

## 2021-08-26 RX ADMIN — MORPHINE SULFATE 4 MG: 4 INJECTION INTRAVENOUS at 04:20

## 2021-08-26 RX ADMIN — ATORVASTATIN CALCIUM 80 MG: 80 TABLET, FILM COATED ORAL at 08:11

## 2021-08-26 RX ADMIN — OXYCODONE HYDROCHLORIDE AND ACETAMINOPHEN 2 TABLET: 5; 325 TABLET ORAL at 05:46

## 2021-08-26 RX ADMIN — ANTACID TABLETS 500 MG: 500 TABLET, CHEWABLE ORAL at 08:11

## 2021-08-26 RX ADMIN — APIXABAN 5 MG: 5 TABLET, FILM COATED ORAL at 08:23

## 2021-08-26 RX ADMIN — HEPARIN SODIUM AND DEXTROSE 19 UNITS/KG/HR: 10000; 5 INJECTION INTRAVENOUS at 05:47

## 2021-08-26 RX ADMIN — POLYETHYLENE GLYCOL 3350 17 G: 17 POWDER, FOR SOLUTION ORAL at 08:22

## 2021-08-26 RX ADMIN — MORPHINE SULFATE 2 MG: 2 INJECTION, SOLUTION INTRAMUSCULAR; INTRAVENOUS at 01:03

## 2021-08-26 RX ADMIN — OXYCODONE HYDROCHLORIDE 10 MG: 5 TABLET ORAL at 12:16

## 2021-08-26 RX ADMIN — GABAPENTIN 300 MG: 600 TABLET ORAL at 15:00

## 2021-08-26 ASSESSMENT — PAIN SCALES - GENERAL
PAINLEVEL_OUTOF10: 10
PAINLEVEL_OUTOF10: 8
PAINLEVEL_OUTOF10: 10
PAINLEVEL_OUTOF10: 8
PAINLEVEL_OUTOF10: 10

## 2021-08-26 ASSESSMENT — PAIN DESCRIPTION - FREQUENCY: FREQUENCY: CONTINUOUS

## 2021-08-26 ASSESSMENT — PAIN DESCRIPTION - ONSET: ONSET: ON-GOING

## 2021-08-26 ASSESSMENT — PAIN DESCRIPTION - LOCATION: LOCATION: FOOT

## 2021-08-26 ASSESSMENT — PAIN DESCRIPTION - DESCRIPTORS: DESCRIPTORS: BURNING;POUNDING

## 2021-08-26 ASSESSMENT — PAIN DESCRIPTION - PROGRESSION
CLINICAL_PROGRESSION: NOT CHANGED
CLINICAL_PROGRESSION: NOT CHANGED

## 2021-08-26 ASSESSMENT — PAIN DESCRIPTION - ORIENTATION: ORIENTATION: LEFT

## 2021-08-26 ASSESSMENT — PAIN DESCRIPTION - PAIN TYPE: TYPE: ACUTE PAIN

## 2021-08-26 NOTE — PLAN OF CARE
Patient discharged. Reviewed discharge paperwork with patient and meds received from meds-to-bed service. Patient is aware that he still needs to  his Gabapentin from [facility] in [place], [place] and stated that he would do so. Educated on smoking cessation. Patient had a ride home.    Problem: Pain:  Goal: Pain level will decrease  Description: Pain level will decrease  8/26/2021 1755 by Stacia Curtis RN  Outcome: Completed  8/26/2021 0816 by Rufina Bashir RN  Outcome: Ongoing  Goal: Control of acute pain  Description: Control of acute pain  Outcome: Completed  Goal: Control of chronic pain  Description: Control of chronic pain  Outcome: Completed     Problem: Falls - Risk of:  Goal: Will remain free from falls  Description: Will remain free from falls  8/26/2021 1755 by Stacia Curtis RN  Outcome: Completed  8/26/2021 0816 by Rufina Bashir RN  Outcome: Met This Shift  Goal: Absence of physical injury  Description: Absence of physical injury  Outcome: Completed

## 2021-08-26 NOTE — PROGRESS NOTES
- Patient takes Acetaminophen 650 mg  at home. Given 975 mg in ED and tolerated well.  - Ordered Acetaminophen 650 mg Q 6 hrs for pain  - Neuro checks q6h  - PT/OT evaluation  - Fall risk protocol Pharmacy Note  Stress Ulcer Prophylaxis Discontinuation    Pharmacist assessment of stress ulcer prophylaxis therapy for Shan Trevino, 35 y.o. male    Patient Active Problem List   Diagnosis    Thyroid cancer ([facility] [address]    Hypothyroidism    Papillary thyroid carcinoma ([facility] [address]    Arterial embolism (HCC)    Tobacco abuse    Cellulitis of third toe of left foot    Toe cyanosis     Past Medical History:   Diagnosis Date    Headache     Hypothyroidism      Recent Labs     08/24/21  0309   INR 1.0     Recent Labs     08/23/21  1056 08/24/21  0309 08/25/21  0430   HGB 18.1* 16.8 16.2   HCT 56.4* 53.1* 50.6*   * 579* 576*       Per the [facility] stress ulcer prophylaxis criteria, the following has been discontinued per P&T Guidelines:    Pepcid                                                                          Stress ulcer prophylaxis criteria:   Any one of the following major risk factors:   Mechanical ventilation ? 48 hours  Coagulopathy (platelets <14,998 mm3, INR >1.5, or aPTT >2 times control, not on anticoagulation)  History of gastrointestinal (GI) ulcerations or GI bleed within past year   Traumatic brain or spinal cord injury   Diana Coma Scale (GCS) ? 10 or inability to obey simple commands  Burn injuries affecting >35% body surface area    At least two of the following minor risk factors:   Length of ICU stay ?7 days  Occult GI bleeding (lasting 6 days or longer)  Sepsis/septic shock (vasopressor support and/or positive microbiologic cultures/suspected infection)  High dose corticosteroid use (>250 mg/day hydrocortisone or equivalent)  Hepatic failure [total bilirubin level >5 mg/dL, AST or ALT >150 U/L (3× ULN)] or partial hepatectomy  Acute renal failure   Transplantation perioperatively in the ICU  Multiple trauma; trauma sustained to more than one body region (injury severity score >15)  _________________________________________________________________________    If the - Patient takes Acetaminophen 650 mg  at home. Given 975 mg in ED and tolerated well.  - Ordered Acetaminophen 650 mg Q 6 hrs for pain  - Neuro checks q6h  - PT/OT evaluation  - Fall risk protocol prescriber doesn't feel this discontinuation is appropriate, re-order the medication and place \"SUP appropriate per prescriber\" in comments of the order. If you would like further assistance or have questions, please contact inpatient pharmacy.      Thank you,    Khalif Everett, PharmD, JUDI, BCPS 8/25/2021 12:51 PM - Patient takes Acetaminophen 650 mg  at home. Given 975 mg in ED and tolerated well.  - Ordered Acetaminophen 650 mg Q 6 hrs for pain  - Neuro checks q6h  - PT/OT evaluation  - Fall risk protocol - Patient takes Acetaminophen 650 mg  at home. Given 975 mg in ED and tolerated well.  - Ordered Acetaminophen 650 mg Q 6 hrs for pain  - Neuro checks q6h  - PT/OT evaluation  - Fall risk protocol - Patient takes Acetaminophen 650 mg  at home. Given 975 mg in ED and tolerated well.  - Ordered Acetaminophen 650 mg Q 6 hrs for pain  - Neuro checks q6h  - PT/OT evaluation  - Fall risk protocol - Patient takes Acetaminophen 650 mg  at home. Given 975 mg in ED and tolerated well.  - Ordered Acetaminophen 650 mg Q 6 hrs for pain  - Neuro checks q6h  - PT/OT evaluation  - Fall risk protocol Hx of CKD stage 3, baseline Scr 1.5- 1.7  - avoid nephrotoxic agents  - Renal consult appreciated. - Patient takes Acetaminophen 650 mg  at home. Given 975 mg in ED and tolerated well.  - Ordered Acetaminophen 650 mg Q 6 hrs for pain  - Neuro checks q6h  - PT/OT evaluation  - Fall risk protocol Hx of CKD stage 3, baseline Scr 1.5- 1.7  - avoid nephrotoxic agents  - Renal consult appreciated. - Patient takes Acetaminophen 650 mg  at home. Given 975 mg in ED and tolerated well.  - Ordered Acetaminophen 650 mg Q 6 hrs for pain  - Neuro checks q6h  - PT/OT evaluation  - Fall risk protocol - Patient takes Acetaminophen 650 mg  at home. Given 975 mg in ED and tolerated well.  - Ordered Acetaminophen 650 mg Q 6 hrs for pain  - Neuro checks q6h  - PT/OT evaluation  - Fall risk protocol - Patient takes Acetaminophen 650 mg  at home. Given 975 mg in ED and tolerated well.  - Ordered Acetaminophen 650 mg Q 6 hrs for pain  - Neuro checks q6h  - PT/OT evaluation  - Fall risk protocol - Patient takes Acetaminophen 650 mg  at home. Given 975 mg in ED and tolerated well.  - Ordered Acetaminophen 650 mg Q 6 hrs for pain  - Neuro checks q6h  - PT/OT evaluation  - Fall risk protocol - Patient takes Acetaminophen 650 mg  at home. Given 975 mg in ED and tolerated well.  - Ordered Acetaminophen 650 mg Q 6 hrs for pain  - Neuro checks q6h  - PT/OT evaluation  - Fall risk protocol - Patient takes Acetaminophen 650 mg  at home. Given 975 mg in ED and tolerated well.  - Ordered Acetaminophen 650 mg Q 6 hrs for pain  - Neuro checks q6h  - PT/OT evaluation  - Fall risk protocol

## 2021-08-26 NOTE — CARE COORDINATION
Met with patient, plan is home with family, he is comfortable with doing his own dressing changes    Discharge [facility] Case Management Department  Written by: Yesenia Cali RN    Patient Name: Caro Mcnair  Attending Provider: Hope Opitz, MD  Admit Date: 2021  9:19 AM  MRN: [id number]  Account: [de-identified]                     : 1980  Discharge Date:  2021        Disposition: home    Yesenia Cali RN

## 2021-08-26 NOTE — PROGRESS NOTES
Progress Note  Podiatric Medicine and Surgery     Subjective     CC: Ischemic digits     Patient seen and examined at bedside. He denies any new complaints. Tolerating diet well. Patient states he often misses his evening dose of Eliquis. Afebrile, vital signs stable       HPI :  Kenna Woods is a 35 y.o. male seen at [facility] floor for left foot pain, swelling and discoloration to the second and third digit of the left foot and right fourth digit. Patient has a past medical history of thyroid cancer and tobacco abuse. Patient states that he has had issues with blue toes on his right foot in the past when he was getting treated for the thyroid cancer in 2016. Patient states that his discoloration improved when he received blood thinner at [facility] a few years ago. At that time he was told that the discoloration occurred due to small clots in his vessels of his right foot. Today he presents with discoloration on the left second and third digit and right fourth digit. Patient states that he has not seen a podiatrist in the past.  Patient states that he has more of a burning and tingling sensation to his left second and third digit. Patient denies any other pedal complaints at this time. ROS: Denies N/V/F/C/SOB/CP. Otherwise negative except at stated in the HPI.      Medications:  Scheduled Meds:   rivaroxaban  20 mg Oral Daily    aspirin  81 mg Oral Daily    atorvastatin  80 mg Oral Daily    calcium carbonate  500 mg Oral Daily    Vitamin D  2,000 Units Oral Daily    levothyroxine  175 mcg Oral Daily    sodium chloride flush  5-40 mL IntraVENous 2 times per day    gabapentin  300 mg Oral TID       Continuous Infusions:   sodium chloride         PRN Meds:oxyCODONE, sodium chloride flush, sodium chloride, potassium chloride **OR** potassium alternative oral replacement **OR** potassium chloride, magnesium sulfate, ondansetron **OR** ondansetron, polyethylene glycol, acetaminophen **OR** acetaminophen    Objective     Vitals:  Patient Vitals for the past 8 hrs:   BP Temp Temp src Pulse Resp   21 0830 129/75 98.4 °F (36.9 °C) Oral 59 20   21 0330 125/79 98.6 °F (37 °C) Oral 57 15     Average, Min, and Max for last 24 hours Vitals:  TEMPERATURE:  Temp  Av.4 °F (36.9 °C)  Min: 98.2 °F (36.8 °C)  Max: 98.6 °F (37 °C)    RESPIRATIONS RANGE: Resp  Avg: 15.2  Min: 13  Max: 20    PULSE RANGE: Pulse  Av.8  Min: 54  Max: 59    BLOOD PRESSURE RANGE:  Systolic (03XUH), ISC:507 , Min:117 , WELSH:023   ; Diastolic (56GKB), CPL:07, Min:70, Max:80      PULSE OXIMETRY RANGE: SpO2  Av %  Min: 95 %  Max: 97 %    I/O last 3 completed shifts: In: 2194 [P.O.:810; I.V.:940]  Out: 2465 [Urine:2465]    CBC:  Recent Labs     21  0309 21  0430   WBC 16.0* 14.7*   HGB 16.8 16.2   HCT 53.1* 50.6*   * 576*        BMP:  Recent Labs     21  1446 21  0309 21  0430   * 133* 132*   K 4.4 4.7 4.0    102 101   CO2 23 22 21   BUN 16 16 10   CREATININE 0.85 1.01 0.82   GLUCOSE 91 108* 105*   CALCIUM 9.2 8.3* 8.4*        Coags:  Recent Labs     21  0309 21  0959 21  0930 21  1745 21  0426   APTT 40.5*   < > 55.1* 97.0* 35.1*   INR 1.0  --   --   --   --     < > = values in this interval not displayed. No results found for: SEDRATE  No results for input(s): CRP in the last 72 hours. Lower Extremity Physical Exam:  Vascular: DP and PT pulses are palpable Bilateral. CFT <3 seconds to digits 1-3 and 5 on the right foot, 1, 3-5 digits on the left foot. Neuro: Light touch sensation is intact to the level of the digits, Bilateral.     Musculoskeletal: Muscle strength is 5/5 to all lower extremity muscle groups, Bilateral.  Pain on palpation to second and third digit, left foot. Gross deformity is absent    Dermatologic: Ischemic changes to left 2nd digit with erythema extending to dorsum of foot.  Malodor appreciated from left cyanosis  Resolved Problems:    History of deep vein thrombosis of lower extremity       Plan     Patient examined and evaluated at bedside   Treatment options discussed in detail with the patient  Continue medical management per primary. At this time no surgical intervention planned from podiatry standpoint. There is low suspicion of infection process of the left foot and no acute osseous abnormality appreciated on radiographs. Given the overall clinical picture patient has acute limb ischemia likely secondary to a microembolic event of the left foot and we will monitor the foot on an outpatient basis. Strongly advised patient to quit tobacco abuse. Applied dry dressing to left foot, patient to change daily, given instructions on how to perform. Rx for dressing supplies sent to [facility] on file. Recommend switching patient to once daily Xarelto for anti-coag as patient is having compliance issues with twice daily Eliquis. Podiatry will sign off at this time. Thank you for the consult. Please [facility] podiatry resident on call with any questions or concerns. Weightbearing as tolerated to left foot in surgical shoe. Patient to follow up with Dr. Jarrett Stockton. Discussed with Dr. Chata Chapa DPM   Podiatric Medicine & Surgery   8/26/2021 at 11:07 AM     I performed a history and physical examination of the patient and discussed management with the resident. I reviewed the residents note and agree with the documented findings and plan of care. Any areas of disagreement are noted on the chart. I was personally present for the key portions of any procedures. I have documented in the chart those procedures where I was not present during the key portions. I have reviewed the Podiatry Resident progress note.  I agree with the chief complaint, past medical history, past surgical history, allergies, medications, social and family history as documented unless otherwise noted below. Documentation of the HPI, Physical Exam and Medical Decision Making performed by medical students or scribes is based on my personal performance of the HPI, PE and MDM. I have personally evaluated this patient and have completed at least one if not all key elements of the E/M (history, physical exam, and MDM). Additional findings are as noted.      Octavio Murphy DPM on 8/26/2021 at 1:70 PM  Board Certified, American Board of Podiatric Surgery  Fellow, [facility] and [facility]

## 2021-08-26 NOTE — PLAN OF CARE
Problem: Falls - Risk of:  Goal: Will remain free from falls  Outcome: Met This Shift     Problem: Pain:  Description: Pain management should include both nonpharmacologic and pharmacologic interventions.   Goal: Pain level will decrease  Outcome: Ongoing

## 2021-08-26 NOTE — PROGRESS NOTES
[facility]    Progress Note    8/26/2021    8:45 AM    Name:   Demond Reilly  MRN:     [id number]     [place]:      [de-identified]   Room:   2001/2001-01  IP Day:  3  Admit Date:  8/23/2021  9:19 AM    PCP:   Sandeep Olvera  Code Status:  Full Code    Subjective:     C/C: Left foot pain     Interval History Status: not changed. Patient was seen and examined at bedside this AM. Continues to complain of severe left foot pain. Heparin has been transitioned to Xarelto per podiatry's recommendation. Brief History:     (per HPI) Demond Reilly is a 35 y.o. Non- / non  male who presents with left foot pain, swelling and discolaration and is admitted to the hospital for the management of Arterial embolism ([facility] [address]    Patient is a 35 yr old  male with a [facility] thyroid cancer and tobacco abuse who presented to an outside facility with left foot pain, swelling and discolored toes. He has had issues with blue toes of his right foot before in the past.  He was seen in [address] and started on a Heparin gtt a few years ago. The discoloration improved and the pain lessened. He was then diagnosed with thyroid cancer about 2 years ago and had 3 open neck surgeries to remove his thyroid gland and 31 lymph nodes. He did have some lymph node spread, and then underwent 3-4 radiation treatments. He follows with ENT, Dr. Jenny Jaimes.       Patient smokes about 10 cigs a day. He has stress and has been unable to quick smoking. He has had pain and swelling to the 3rd left digit for a few months, he was told it might be infected, no drainage. He denies any fevers. His 2nd left toe has turned dusky over the last few days and hurts tremendously. No sob, chest pain or swelling of his legs. He now notices left foot swelling, and redness on the top of his foot.       He was transferred on a Heparin gtt and has a consult to Vascular surgery.   He does Net -1315 ml       Labs:  Hematology:  Recent Labs     08/23/21  1056 08/24/21  0309 08/25/21  0430   WBC 15.4* 16.0* 14.7*   RBC 6.21* 5.77 5.64   HGB 18.1* 16.8 16.2   HCT 56.4* 53.1* 50.6*   MCV 90.8 92.0 89.7   MCH 29.1 29.1 28.7   MCHC 32.1 31.6 32.0   RDW 16.2* 15.7* 15.5*   * 579* 576*   MPV 9.6 9.7 10.1   INR  --  1.0  --      Chemistry:  Recent Labs     08/23/21  1446 08/24/21  0309 08/25/21  0430   * 133* 132*   K 4.4 4.7 4.0    102 101   CO2 23 22 21   GLUCOSE 91 108* 105*   BUN 16 16 10   CREATININE 0.85 1.01 0.82   ANIONGAP 11 9 10   LABGLOM >60 >60 >60   GFRAA >60 >60 >60   CALCIUM 9.2 8.3* 8.4*   No results for input(s): PROT, LABALBU, LABA1C, I8BDGVP, M8OEPOC, FT4, TSH, AST, ALT, LDH, GGT, ALKPHOS, LABGGT, BILITOT, BILIDIR, AMMONIA, AMYLASE, LIPASE, LACTATE, CHOL, HDL, LDLCHOLESTEROL, CHOLHDLRATIO, TRIG, VLDL, AXZ13TF, PHENYTOIN, PHENYF, URICACID, POCGLU in the last 72 hours. ABG:No results found for: POCPH, PHART, PH, POCPCO2, IVQ6LNC, PCO2, POCPO2, PO2ART, PO2, POCHCO3, CZQ0LBW, HCO3, NBEA, PBEA, BEART, BE, THGBART, THB, XUH6MAE, XPDW8AJR, J4UIJOZT, O2SAT, FIO2  Lab Results   Component Value Date/Time    SPECIAL LA 9 CC 08/23/2021 02:46 PM    SPECIAL LH UNK CC 08/23/2021 02:46 PM     Lab Results   Component Value Date/Time    CULTURE NO GROWTH 2 DAYS 08/23/2021 02:46 PM    CULTURE NO GROWTH 2 DAYS 08/23/2021 02:46 PM       Radiology:  XR FOOT LEFT (2 VIEWS)    Result Date: 8/23/2021  No acute osseous or soft tissue abnormality. CTA CHEST W CONTRAST    Result Date: 8/23/2021  No acute abnormality of the thoracic aorta (when taking into account mild pulsation/motion artifact). Mild secretions/debris within the trachea and bronchus intermedius. Probable subsegmental atelectasis/scarring in the lingula. Couple of scattered tiny nodules measuring 4 mm or less.        Physical Examination:        General appearance:  alert, cooperative and no distress  Mental Status:  oriented to person, place and time and normal affect  Lungs:  clear to auscultation bilaterally, normal effort  Heart:  regular rate and rhythm, no murmur  Abdomen:  soft, nontender, nondistended, normal bowel sounds, no masses, hepatomegaly, splenomegaly  Extremities:  Cyanosis appreciated in left second and third toe. Left second toe appears necrotic. Skin:  no gross lesions, rashes, induration    Assessment:        Hospital Problems         Last Modified POA    * (Principal) Arterial embolism ([address] 8/23/2021 Yes    Hypothyroidism 8/23/2021 Yes    Tobacco abuse 8/23/2021 Yes    Cellulitis of third toe of left foot 8/23/2021 Yes    Toe cyanosis 8/23/2021 Yes          Plan:        Left 2nd toe cyanosis- likely 2/2 blue toe syndrome. Re-consulted podiatry this AM. Transition heparin to Xarelto. Hypercoagulability workup as outpatient. Continue PRN oxycodone for pain management. Thyroid cancer s/p thyroidectomy -Continue levothyroxine 175 mcg daily   Hyperlipidemia -Continue atorvastatin 80 mg daily   Neuropathic pain -Continue gabapentin   Tobacco abuse -Encouraged cessation.      Jaskaran Newberry MD  8/26/2021  8:45 AM

## 2021-08-26 NOTE — PLAN OF CARE
Notified Dr. Wilson of the following via secure messenger: HR has been between 47-49BPM for the better part of an hour. Patient is asymptomatic and states he is fine. .. Dr. Wilson stated to watch patient for now.

## 2021-08-26 NOTE — DISCHARGE SUMMARY
[facility]  Office: [address], DO, Robert Prider, DO, Riley Narrow, DO, Lorie Marshall Blood, DO, Olvin Gallego MD, Rober Moreno MD, Hira Lazaro MD, Oanh Johnson MD, Rina Yu MD, Neftali Daniel MD, Fany Kunz MD, Christin Arnold, DO, Sage Lord MD, Wilfredo Tucker, DO, Bill Funes MD,  Mandi To, DO, Jadyn David MD, Werner Escudero MD, Markus Munoz MD, Teresita Childers MD, Ayah Kenyon MD, Suleiman Benítez MD, Dorothy Fagan MD, Augustine Jules, [facility], [facility], CNP, Solo Johnson, CNP, Laura Councilman, CNS, Delfina Islas, CNP, Alicia Vigil, CNP, Pilar Welsh, CNP, Danii Poe, CNP, Cain Mims, CNP, Maikel Mcgee PA-C, Polo Dennis, [facility], Sabino Reed, CNP, Mayi Child, CNP, Trinda Starch, CNP, Fallon Opal, CNP, America Kugel, CNP, Naila Copper, CNP, Carole Groves, [facility], Arleth Laws, [facility]    Discharge Summary     Patient ID: Kenna Woods  :  1980   MRN: [id number]     ACCOUNT:  [de-identified]   Patient's PCP: Yelitza Haji  Admit Date: 2021   Discharge Date: 2021     Length of Stay: 3  Code Status:  Full Code  Admitting Physician: Teresita Childers MD  Discharge Physician: Teresita Childers MD     Active Discharge Diagnoses:     Hospital Problem Lists:  Principal Problem:    Arterial embolism [facility])  Active Problems:    Hypothyroidism    Tobacco abuse    Cellulitis of third toe of left foot    Toe cyanosis  Resolved Problems:    History of deep vein thrombosis of lower extremity      Admission Condition:  stable     Discharged Condition: good    Hospital Stay:     Hospital Course:  Kenna Woods is a 35 y.o. male with a past medical history of blue toe syndrome, thyroid cancer s/p thyroidectomy and hyperlipidemia who presented to the emergency department on 2021 complaining of severe pain in his left second toe.  The patient states that the pain started soft tissue abnormality. CTA CHEST W CONTRAST    Result Date: 8/24/2021  No acute abnormality of the thoracic aorta (when taking into account mild pulsation/motion artifact). Mild secretions/debris within the trachea and bronchus intermedius. Probable subsegmental atelectasis/scarring in the lingula. Couple of scattered tiny nodules measuring 4 mm or less. RECOMMENDATIONS: Multiple pulmonary nodules. Most severe: 4 mm right solid pulmonary nodule within the upper lobe. If patient is low risk for malignancy, no routine follow-up imaging is recommended; if patient is high risk for malignancy, a non-contrast Chest CT at 12 months is optional. If performed and the nodule is stable at 12 months, no further follow-up is recommended. These guidelines do not apply to immunocompromised patients and patients with cancer. Follow up in patients with significant comorbidities as clinically warranted. For lung cancer screening, adhere to Lung-RADS guidelines. Reference: Radiology. 2017; 284(1):228-43. CTA LOWER EXTREMITY LEFT W CONTRAST    Result Date: 8/25/2021  Mild mural thrombus without hemodynamically significant stenosis in mid to distal portion of the left external iliac artery and proximal portion of the left SFA. Otherwise normal exam.       Consultations:    Consults:     Final Specialist Recommendations/Findings:   IP CONSULT TO VASCULAR SURGERY  IP CONSULT TO PODIATRY  IP CONSULT TO PODIATRY      The patient was seen and examined on day of discharge and this discharge summary is in conjunction with any daily progress note from day of discharge.     Discharge plan:     Disposition: Home    Physician Follow Up:     Westley Pavon DPM  [address] [place] [address]  [phone]    Schedule an appointment as soon as possible for a visit in 1 week  As needed    Lincoln Gutierrez    In 1 week  Hypercoagulability workup; hospital follow-up        Requiring Further Evaluation/Follow Up POST HOSPITALIZATION/Incidental Findings: Patient needs continued follow-up with podiatry as well as hypercoagulability workup. Diet: regular diet    Activity: As tolerated]    Instructions to Patient: Take oxycodone for up to three days for severe pain. Wean as tolerated. Follow-up with podiatry and your primary care doctor as scheduled. Discharge Medications:      Medication List      START taking these medications    Gauze Pads & Dressings 2\"X2\" Pads  Apply to left foot once daily. oxyCODONE HCl 10 MG immediate release tablet  Commonly known as: OXY-IR  Take 1 tablet by mouth every 6 hours as needed for Pain for up to 3 days. rivaroxaban 20 MG Tabs tablet  Commonly known as: XARELTO  Take 1 tablet by mouth daily (with breakfast)     Sof-Arvind Conforming Bandage Misc  Apply to left foot once daily.         CONTINUE taking these medications    aspirin 81 MG tablet     atorvastatin 80 MG tablet  Commonly known as: LIPITOR     calcium carbonate 500 MG Tabs tablet  Commonly known as: OSCAL  Take 1 tablet by mouth daily     gabapentin 300 MG capsule  Commonly known as: NEURONTIN     levothyroxine 175 MCG tablet  Commonly known as: SYNTHROID  Once a day     Vitamin D3 50 MCG (2000 UT) Tabs        STOP taking these medications    apixaban 5 MG Tabs tablet  Commonly known as: ELIQUIS           Where to Get Your Medications      These medications were sent to [address] - [address] [facility] [address] - P [phone] Octavio Galeano [phone]  [address] [facility] [place] [address]    Phone: [phone]   Gauze Pads & Dressings 2\"X2\" Pads  Sof-Arvind Conforming Bandage Misc     These medications were sent to [facility] [address] [facility], [address] [facility] [address] [facility]  [address], [address] [address]    Phone: [phone]   oxyCODONE HCl 10 MG immediate release tablet  rivaroxaban 20 MG Tabs tablet         Time Spent on discharge is  20 mins in patient examination, evaluation, counseling as well as medication reconciliation, prescriptions for required medications, discharge plan and follow up. Electronically signed by   Mariah Maradiaga MD  8/26/2021  2:13 PM      Thank you Dr. Sandeep Olvera for the opportunity to be involved in this patient's care.

## 2021-08-29 LAB
CULTURE: NORMAL
CULTURE: NORMAL
Lab: NORMAL
Lab: NORMAL
SPECIMEN DESCRIPTION: NORMAL
SPECIMEN DESCRIPTION: NORMAL

## 2021-09-07 ENCOUNTER — OFFICE VISIT (OUTPATIENT)
Dept: PODIATRY | Age: 41
End: 2021-09-07
Payer: MEDICARE

## 2021-09-07 VITALS — HEIGHT: 73 IN | WEIGHT: 225 LBS | BODY MASS INDEX: 29.82 KG/M2

## 2021-09-07 DIAGNOSIS — L97.523 SKIN ULCER OF TOE OF LEFT FOOT WITH NECROSIS OF MUSCLE (HCC): ICD-10-CM

## 2021-09-07 DIAGNOSIS — I73.9 PERIPHERAL VASCULAR DISORDER (HCC): ICD-10-CM

## 2021-09-07 DIAGNOSIS — M79.675 PAIN IN TOE OF LEFT FOOT: ICD-10-CM

## 2021-09-07 DIAGNOSIS — I96 GANGRENE OF TOE OF LEFT FOOT (HCC): Primary | ICD-10-CM

## 2021-09-07 PROCEDURE — 99203 OFFICE O/P NEW LOW 30 MIN: CPT | Performed by: PODIATRIST

## 2021-09-07 PROCEDURE — 1111F DSCHRG MED/CURRENT MED MERGE: CPT | Performed by: PODIATRIST

## 2021-09-07 PROCEDURE — G8417 CALC BMI ABV UP PARAM F/U: HCPCS | Performed by: PODIATRIST

## 2021-09-07 PROCEDURE — 4004F PT TOBACCO SCREEN RCVD TLK: CPT | Performed by: PODIATRIST

## 2021-09-07 PROCEDURE — G8427 DOCREV CUR MEDS BY ELIG CLIN: HCPCS | Performed by: PODIATRIST

## 2021-09-07 RX ORDER — DOXYCYCLINE HYCLATE 100 MG/1
100 CAPSULE ORAL 2 TIMES DAILY
Qty: 28 CAPSULE | Refills: 0 | Status: SHIPPED | OUTPATIENT
Start: 2021-09-07 | End: 2021-09-21

## 2021-09-07 ASSESSMENT — ENCOUNTER SYMPTOMS
BACK PAIN: 0
NAUSEA: 0
COLOR CHANGE: 0
SHORTNESS OF BREATH: 0
DIARRHEA: 0

## 2021-09-07 NOTE — PROGRESS NOTES
Bib Lua is a 35 y.o. male who presents to the office today with chief complaint of wound to the left second toe. Chief Complaint   Patient presents with    Toe Pain     left second toe   Symptoms began about 1 month(s) ago. Patient denies injury to the left second toe. Patient states that there is pain to the toe. Pain is rated 10 out of 10 at it's worst and is described as intermittent. Treatments prior to today's visit include: Patient states that he had a wound to the left third toe all summer, but it healed up. Patient states that he was in the hospital for this for about 5 days and had IV antibiotics and heparin. Patient states that the day before he went in to the hospital his left second toe started turning black. Patient states that the doctors at the hospital referred him to our office for care. Patient is dressing the toe with gauze only. No Known Allergies    Past Medical History:   Diagnosis Date    Headache     Hypothyroidism        Prior to Admission medications    Medication Sig Start Date End Date Taking? Authorizing Provider   doxycycline hyclate (VIBRAMYCIN) 100 MG capsule Take 1 capsule by mouth 2 times daily for 14 days 9/7/21 9/21/21 Yes Sang Rodriguez DPM   Gauze Pads & Dressings 2\"X2\" PADS Apply to left foot once daily. 8/26/21  Yes Conchita Washington DPM   Gauze Bandages (SOF-DEV CONFORMING BANDAGE) MISC Apply to left foot once daily. 8/26/21  Yes Conchita Washington DPM   rivaroxaban (XARELTO) 20 MG TABS tablet Take 1 tablet by mouth daily (with breakfast) 8/26/21  Yes Ruperto Gomes MD   Povidone-Iodine 7.5 % SWAB Please apply to left 2nd digit daily with dressing change. 8/26/21  Yes Conchita Washington DPM   gabapentin (NEURONTIN) 300 MG capsule Take 1 capsule by mouth 3 times daily for 30 days.  Intended supply: 90 days 8/26/21 9/25/21 Yes Ruperto Gomes MD   levothyroxine (SYNTHROID) 175 MCG tablet Once a day 12/22/20  Yes Parish Koehler MD   aspirin 81 MG tablet Take 81 mg by mouth daily   Yes Historical Provider, MD   atorvastatin (LIPITOR) 80 MG tablet Take 80 mg by mouth daily   Yes Historical Provider, MD   calcium carbonate (OSCAL) 500 MG TABS tablet Take 1 tablet by mouth daily 1/29/18  Yes Wellington Hutchinson MD   Cholecalciferol (VITAMIN D3) 2000 units TABS Take by mouth   Yes Historical Provider, MD       Past Surgical History:   Procedure Laterality Date    TOTAL THYROIDECTOMY Bilateral 2017       Family History   Problem Relation Age of Onset    Cancer Mother     High Blood Pressure Father     Asthma Brother        Social History     Tobacco Use    Smoking status: Current Every Day Smoker    Smokeless tobacco: Never Used    Tobacco comment: trying to cut back   Substance Use Topics    Alcohol use: Not on file       Review of Systems   Constitutional: Negative for activity change, appetite change, chills, diaphoresis, fatigue and fever. Respiratory: Negative for shortness of breath. Cardiovascular: Negative for leg swelling. Gastrointestinal: Negative for diarrhea and nausea. Endocrine: Negative for cold intolerance, heat intolerance and polyuria. Musculoskeletal: Negative for arthralgias, back pain, gait problem, joint swelling and myalgias. Skin: Positive for wound. Negative for color change, pallor and rash. Allergic/Immunologic: Negative for environmental allergies and food allergies. Neurological: Negative for dizziness, weakness, light-headedness and numbness. Hematological: Does not bruise/bleed easily. Psychiatric/Behavioral: Negative for behavioral problems, confusion and self-injury. The patient is not nervous/anxious. Vitals: There were no vitals filed for this visit. General: AAO x 3 in NAD. Integument: There is black, necrotic tissue to the distal two thirds of the left second toe. There is a small area of moisture with purulence to the dorsal aspect of this toe. There is pain with palpation and manipulation of the left second toe. muscle groups of the right lower extremity and +5/5 to all four muscle groups of the left lower extremity. There are no areas of subluxation, dislocation, or laxity noted to either lower extremity. Range of motion to the right ankle is  free of pain or grinding. Range of motion to the left ankle is  free of pain or grinding. Range of motion to the right subtalar joint is  free of pain or grinding. Range of motion to the left subtalar joint is  free of pain or grinding. No abnormalities, asymmetries, or misalignments are seen between the extremities. Weightbearing evaluation does not reveal rearfoot eversion, medial prominence of the talar head, loss of the medial longitudinal arch height, and too many toes sign bilaterally. The lesser digits of the right foot are contracted. The lesser digits of the left foot are contracted. There is no prominence noted to the first metatarsal head without abduction of the hallux of the right foot. There is no prominence noted to the first metatarsal head without abduction of the hallux of the left foot. Shoe examination was performed. Biomechanical Exam: normal bilaterally. Asessment: Patient is a 35 y.o. male with:    Diagnosis Orders   1. Gangrene of toe of left foot ([address]  Alysia Leigh MD, Vascular Surgery, [place]    doxycycline hyclate (VIBRAMYCIN) 100 MG capsule   2. Skin ulcer of toe of left foot with necrosis of muscle ([address]  Asha Lanier MD, Vascular Surgery, [place]    doxycycline hyclate (VIBRAMYCIN) 100 MG capsule   3. Pain in toe of left foot  Alysia Leigh MD, Vascular Surgery, [place]    doxycycline hyclate (VIBRAMYCIN) 100 MG capsule   4. Peripheral vascular disorder ([address]  Alysia Leigh MD, Vascular Surgery, [place]    doxycycline hyclate (VIBRAMYCIN) 100 MG capsule       Plan:  1. Clinical evaluation of the patient.  2. The left second toe was treated with betadine and then dressed with gauze and a Kerlix wrap. Patient to change this dressing daily. Patient given a prescription for doxycycline 100 mg bid for 14 days. Patient given a referral to Dr. Willy Tineo, Vascular surgery, for evaluation. 3. Contact office with any questions/problems/concerns. Return in about 1 week (around 9/14/2021) for Wound Care.    9/7/2021      Laura Greco DPM

## 2021-09-16 ENCOUNTER — TELEPHONE (OUTPATIENT)
Dept: VASCULAR SURGERY | Age: 41
End: 2021-09-16

## 2021-09-16 NOTE — TELEPHONE ENCOUNTER
Pt called to say that he is cancelling his appt for 9/17/21 due to lack of smell and taste (X5 days). He will not get tested for Covid. Will call to reschedule at a later date. Pt used vulgar language and spoke aggressively to me.

## 2021-10-04 ENCOUNTER — TELEPHONE (OUTPATIENT)
Dept: VASCULAR SURGERY | Age: 41
End: 2021-10-04

## 2021-10-11 ENCOUNTER — HOSPITAL ENCOUNTER (OUTPATIENT)
Dept: LAB | Age: 41
Setting detail: SPECIMEN
Discharge: HOME | End: 2021-10-11
Payer: MEDICARE

## 2021-10-11 ENCOUNTER — OFFICE VISIT (OUTPATIENT)
Dept: PODIATRY | Age: 41
End: 2021-10-11
Payer: MEDICARE

## 2021-10-11 VITALS — BODY MASS INDEX: 29.82 KG/M2 | HEIGHT: 73 IN | WEIGHT: 225 LBS

## 2021-10-11 DIAGNOSIS — I73.9 PERIPHERAL VASCULAR DISORDER (HCC): ICD-10-CM

## 2021-10-11 DIAGNOSIS — M86.9 OSTEOMYELITIS OF SECOND TOE OF LEFT FOOT (HCC): ICD-10-CM

## 2021-10-11 DIAGNOSIS — I96 GANGRENE OF TOE OF LEFT FOOT (HCC): Primary | ICD-10-CM

## 2021-10-11 DIAGNOSIS — M79.675 PAIN IN TOE OF LEFT FOOT: ICD-10-CM

## 2021-10-11 DIAGNOSIS — L08.9 INFECTION OF TOE: ICD-10-CM

## 2021-10-11 PROCEDURE — 99213 OFFICE O/P EST LOW 20 MIN: CPT | Performed by: PODIATRIST

## 2021-10-11 PROCEDURE — U0003 INFECTIOUS AGENT DETECTION BY NUCLEIC ACID (DNA OR RNA); SEVERE ACUTE RESPIRATORY SYNDROME CORONAVIRUS 2 (SARS-COV-2) (CORONAVIRUS DISEASE [COVID-19]), AMPLIFIED PROBE TECHNIQUE, MAKING USE OF HIGH THROUGHPUT TECHNOLOGIES AS DESCRIBED BY CMS-2020-01-R: HCPCS

## 2021-10-11 PROCEDURE — U0005 INFEC AGEN DETEC AMPLI PROBE: HCPCS

## 2021-10-11 RX ORDER — CLINDAMYCIN HYDROCHLORIDE 300 MG/1
300 CAPSULE ORAL 4 TIMES DAILY
Qty: 56 CAPSULE | Refills: 0 | Status: SHIPPED | OUTPATIENT
Start: 2021-10-11 | End: 2021-10-25

## 2021-10-11 RX ORDER — CIPROFLOXACIN 500 MG/1
500 TABLET, FILM COATED ORAL 2 TIMES DAILY
Qty: 28 TABLET | Refills: 0 | Status: SHIPPED | OUTPATIENT
Start: 2021-10-11 | End: 2021-10-25

## 2021-10-11 ASSESSMENT — ENCOUNTER SYMPTOMS
COLOR CHANGE: 0
DIARRHEA: 0
SHORTNESS OF BREATH: 0
NAUSEA: 0
BACK PAIN: 0

## 2021-10-11 NOTE — PROGRESS NOTES
[facility] Podiatry  Return Patient Progress Note    Subjective: Barrie Jackson 35 y.o. male that presents for follow up evaluation of gangrene to the left second toe. Chief Complaint   Patient presents with    Wound Check     left 2nd toe     Patient's treatment thus far has included daily dressing changes with betadine and a DSD. Pain is rated 10 out of 10 and is described as intermittent. Patient has not been following my prescribed course of therapy as instructed. Patient was supposed to see me one week from his last appointment, but never showed. Patient states that he was sick. Review of Systems   Constitutional: Negative for activity change, appetite change, chills, diaphoresis, fatigue and fever. Respiratory: Negative for shortness of breath. Cardiovascular: Negative for leg swelling. Gastrointestinal: Negative for diarrhea and nausea. Endocrine: Negative for cold intolerance, heat intolerance and polyuria. Musculoskeletal: Negative for arthralgias, back pain, gait problem, joint swelling and myalgias. Skin: Positive for wound. Negative for color change, pallor and rash. Allergic/Immunologic: Negative for environmental allergies and food allergies. Neurological: Negative for dizziness, weakness, light-headedness and numbness. Hematological: Does not bruise/bleed easily. Psychiatric/Behavioral: Negative for behavioral problems, confusion and self-injury. The patient is not nervous/anxious. Objective: Clinical evaluation of the patient reveals black, necrotic tissue to the distal two thirds of the left second toe. There is moisture noted to the left second toe at the interface of the gangrenous and healthy tissue. There is serous drainage noted to the left second toe, but no purulence is noted. There is pain with palpation and manipulation of the left second toe. There is a strong malodor noted to this wound today.  This wound is secondary to arterial insufficiency. Assessment:    Diagnosis Orders   1. Gangrene of toe of left foot (HCC)  ciprofloxacin (CIPRO) 500 MG tablet    clindamycin (CLEOCIN) 300 MG capsule   2. Osteomyelitis of second toe of left foot (HCC)  ciprofloxacin (CIPRO) 500 MG tablet    clindamycin (CLEOCIN) 300 MG capsule   3. Infection of toe  ciprofloxacin (CIPRO) 500 MG tablet    clindamycin (CLEOCIN) 300 MG capsule   4. Pain in toe of left foot  ciprofloxacin (CIPRO) 500 MG tablet    clindamycin (CLEOCIN) 300 MG capsule   5. Peripheral vascular disorder (HCC)  ciprofloxacin (CIPRO) 500 MG tablet    clindamycin (CLEOCIN) 300 MG capsule         Plan: 1. Clinical evaluation of the patient. 2. The left second toe was dressed with betadine and a DSD. Patient to change this dressing daily. Patient is scheduled for amputation of this toe on Friday, October 15, 2021. Patient given prescriptions for oral clindamycin and ciprofloxacin. 3. Return if symptoms worsen or fail to improve, for Patient to schedule surgery for amputation left second toe.    10/11/2021      Mark Dwyer DPM

## 2021-10-12 LAB
SARS-COV-2: NORMAL
SARS-COV-2: NOT DETECTED
SOURCE: NORMAL

## 2021-10-14 ENCOUNTER — HOSPITAL ENCOUNTER (OUTPATIENT)
Age: 41
Setting detail: OUTPATIENT SURGERY
Discharge: HOME | End: 2021-10-14
Attending: PODIATRIST | Admitting: PODIATRIST
Payer: MEDICARE

## 2021-10-14 ENCOUNTER — HOSPITAL ENCOUNTER (EMERGENCY)
Age: 41
Discharge: HOME | End: 2021-10-14
Attending: STUDENT IN AN ORGANIZED HEALTH CARE EDUCATION/TRAINING PROGRAM
Payer: MEDICARE

## 2021-10-14 ENCOUNTER — APPOINTMENT (OUTPATIENT)
Dept: GENERAL RADIOLOGY | Age: 41
End: 2021-10-14
Payer: MEDICARE

## 2021-10-14 ENCOUNTER — HOSPITAL ENCOUNTER (OUTPATIENT)
Dept: PREADMISSION TESTING | Age: 41
Discharge: HOME | End: 2021-10-18
Payer: MEDICARE

## 2021-10-14 ENCOUNTER — TELEPHONE (OUTPATIENT)
Dept: PODIATRY | Age: 41
End: 2021-10-14

## 2021-10-14 VITALS
SYSTOLIC BLOOD PRESSURE: 100 MMHG | HEART RATE: 68 BPM | TEMPERATURE: 97.9 F | OXYGEN SATURATION: 98 % | DIASTOLIC BLOOD PRESSURE: 42 MMHG | RESPIRATION RATE: 16 BRPM

## 2021-10-14 VITALS
RESPIRATION RATE: 16 BRPM | DIASTOLIC BLOOD PRESSURE: 79 MMHG | WEIGHT: 235 LBS | HEART RATE: 60 BPM | OXYGEN SATURATION: 100 % | HEIGHT: 73 IN | SYSTOLIC BLOOD PRESSURE: 140 MMHG | TEMPERATURE: 97.8 F | BODY MASS INDEX: 31.14 KG/M2

## 2021-10-14 VITALS
WEIGHT: 230 LBS | HEART RATE: 56 BPM | OXYGEN SATURATION: 97 % | DIASTOLIC BLOOD PRESSURE: 85 MMHG | SYSTOLIC BLOOD PRESSURE: 121 MMHG | HEIGHT: 73 IN | BODY MASS INDEX: 30.48 KG/M2 | TEMPERATURE: 98.3 F | RESPIRATION RATE: 9 BRPM

## 2021-10-14 DIAGNOSIS — U07.1 COVID-19: ICD-10-CM

## 2021-10-14 DIAGNOSIS — R94.31 NONSPECIFIC ST-T WAVE ELECTROCARDIOGRAPHIC CHANGES: Primary | ICD-10-CM

## 2021-10-14 DIAGNOSIS — G89.18 ACUTE POST-OPERATIVE PAIN: Primary | ICD-10-CM

## 2021-10-14 LAB
ABSOLUTE EOS #: 0.5 K/UL (ref 0–0.4)
ABSOLUTE EOS #: 0.5 K/UL (ref 0–0.4)
ABSOLUTE IMMATURE GRANULOCYTE: ABNORMAL K/UL (ref 0–0.3)
ABSOLUTE IMMATURE GRANULOCYTE: ABNORMAL K/UL (ref 0–0.3)
ABSOLUTE LYMPH #: 2.9 K/UL (ref 1–4.8)
ABSOLUTE LYMPH #: 2.9 K/UL (ref 1–4.8)
ABSOLUTE MONO #: 0.7 K/UL (ref 0.1–1.3)
ABSOLUTE MONO #: 0.9 K/UL (ref 0.1–1.3)
ANION GAP SERPL CALCULATED.3IONS-SCNC: 9 MMOL/L (ref 9–17)
ANION GAP SERPL CALCULATED.3IONS-SCNC: 9 MMOL/L (ref 9–17)
BASOPHILS # BLD: 1 % (ref 0–2)
BASOPHILS # BLD: 1 % (ref 0–2)
BASOPHILS ABSOLUTE: 0.2 K/UL (ref 0–0.2)
BASOPHILS ABSOLUTE: 0.2 K/UL (ref 0–0.2)
BUN BLDV-MCNC: 14 MG/DL (ref 6–20)
BUN BLDV-MCNC: 14 MG/DL (ref 6–20)
BUN/CREAT BLD: NORMAL (ref 9–20)
BUN/CREAT BLD: NORMAL (ref 9–20)
CALCIUM SERPL-MCNC: 9.8 MG/DL (ref 8.6–10.4)
CALCIUM SERPL-MCNC: 9.8 MG/DL (ref 8.6–10.4)
CHLORIDE BLD-SCNC: 104 MMOL/L (ref 98–107)
CHLORIDE BLD-SCNC: 104 MMOL/L (ref 98–107)
CO2: 28 MMOL/L (ref 20–31)
CO2: 28 MMOL/L (ref 20–31)
CREAT SERPL-MCNC: 0.94 MG/DL (ref 0.7–1.2)
CREAT SERPL-MCNC: 0.94 MG/DL (ref 0.7–1.2)
DIFFERENTIAL TYPE: ABNORMAL
DIFFERENTIAL TYPE: ABNORMAL
EOSINOPHILS RELATIVE PERCENT: 3 % (ref 0–4)
EOSINOPHILS RELATIVE PERCENT: 4 % (ref 0–4)
GFR AFRICAN AMERICAN: >60 ML/MIN
GFR AFRICAN AMERICAN: >60 ML/MIN
GFR NON-AFRICAN AMERICAN: >60 ML/MIN
GFR NON-AFRICAN AMERICAN: >60 ML/MIN
GFR SERPL CREATININE-BSD FRML MDRD: NORMAL ML/MIN/{1.73_M2}
GLUCOSE BLD-MCNC: 98 MG/DL (ref 70–99)
GLUCOSE BLD-MCNC: 98 MG/DL (ref 70–99)
HCT VFR BLD CALC: 52.1 % (ref 41–53)
HCT VFR BLD CALC: 53.7 % (ref 41–53)
HEMOGLOBIN: 17.5 G/DL (ref 13.5–17.5)
HEMOGLOBIN: 17.9 G/DL (ref 13.5–17.5)
IMMATURE GRANULOCYTES: ABNORMAL %
IMMATURE GRANULOCYTES: ABNORMAL %
LYMPHOCYTES # BLD: 20 % (ref 24–44)
LYMPHOCYTES # BLD: 22 % (ref 24–44)
MAGNESIUM: 2.3 MG/DL (ref 1.6–2.6)
MCH RBC QN AUTO: 29.8 PG (ref 26–34)
MCH RBC QN AUTO: 29.8 PG (ref 26–34)
MCHC RBC AUTO-ENTMCNC: 33.4 G/DL (ref 31–37)
MCHC RBC AUTO-ENTMCNC: 33.6 G/DL (ref 31–37)
MCV RBC AUTO: 88.8 FL (ref 80–100)
MCV RBC AUTO: 89.3 FL (ref 80–100)
MONOCYTES # BLD: 6 % (ref 1–7)
MONOCYTES # BLD: 6 % (ref 1–7)
NRBC AUTOMATED: ABNORMAL PER 100 WBC
NRBC AUTOMATED: ABNORMAL PER 100 WBC
PDW BLD-RTO: 15.3 % (ref 11.5–14.9)
PDW BLD-RTO: 15.4 % (ref 11.5–14.9)
PLATELET # BLD: 604 K/UL (ref 150–450)
PLATELET # BLD: 664 K/UL (ref 150–450)
PLATELET ESTIMATE: ABNORMAL
PLATELET ESTIMATE: ABNORMAL
PMV BLD AUTO: 7.7 FL (ref 6–12)
PMV BLD AUTO: 7.9 FL (ref 6–12)
POTASSIUM SERPL-SCNC: 4.4 MMOL/L (ref 3.7–5.3)
POTASSIUM SERPL-SCNC: 4.4 MMOL/L (ref 3.7–5.3)
RBC # BLD: 5.86 M/UL (ref 4.5–5.9)
RBC # BLD: 6.02 M/UL (ref 4.5–5.9)
RBC # BLD: ABNORMAL 10*6/UL
RBC # BLD: ABNORMAL 10*6/UL
SARS-COV-2, RAPID: DETECTED
SEG NEUTROPHILS: 67 % (ref 36–66)
SEG NEUTROPHILS: 70 % (ref 36–66)
SEGMENTED NEUTROPHILS ABSOLUTE COUNT: 10.1 K/UL (ref 1.3–9.1)
SEGMENTED NEUTROPHILS ABSOLUTE COUNT: 8.9 K/UL (ref 1.3–9.1)
SODIUM BLD-SCNC: 141 MMOL/L (ref 135–144)
SODIUM BLD-SCNC: 141 MMOL/L (ref 135–144)
SPECIMEN DESCRIPTION: ABNORMAL
TROPONIN INTERP: NORMAL
TROPONIN INTERP: NORMAL
TROPONIN T: NORMAL NG/ML
TROPONIN T: NORMAL NG/ML
TROPONIN, HIGH SENSITIVITY: 7 NG/L (ref 0–22)
TROPONIN, HIGH SENSITIVITY: 7 NG/L (ref 0–22)
WBC # BLD: 13.2 K/UL (ref 3.5–11)
WBC # BLD: 14.6 K/UL (ref 3.5–11)
WBC # BLD: ABNORMAL 10*3/UL
WBC # BLD: ABNORMAL 10*3/UL

## 2021-10-14 PROCEDURE — 84484 ASSAY OF TROPONIN QUANT: CPT

## 2021-10-14 PROCEDURE — 7100000000 HC PACU RECOVERY - FIRST 15 MIN: Performed by: PODIATRIST

## 2021-10-14 PROCEDURE — 85025 COMPLETE CBC W/AUTO DIFF WBC: CPT

## 2021-10-14 PROCEDURE — 88305 TISSUE EXAM BY PATHOLOGIST: CPT

## 2021-10-14 PROCEDURE — 83735 ASSAY OF MAGNESIUM: CPT

## 2021-10-14 PROCEDURE — 87635 SARS-COV-2 COVID-19 AMP PRB: CPT

## 2021-10-14 PROCEDURE — 7100000001 HC PACU RECOVERY - ADDTL 15 MIN: Performed by: PODIATRIST

## 2021-10-14 PROCEDURE — 36415 COLL VENOUS BLD VENIPUNCTURE: CPT

## 2021-10-14 PROCEDURE — 7100000011 HC PHASE II RECOVERY - ADDTL 15 MIN: Performed by: PODIATRIST

## 2021-10-14 PROCEDURE — 3600000002 HC SURGERY LEVEL 2 BASE: Performed by: PODIATRIST

## 2021-10-14 PROCEDURE — 71045 X-RAY EXAM CHEST 1 VIEW: CPT

## 2021-10-14 PROCEDURE — 80048 BASIC METABOLIC PNL TOTAL CA: CPT

## 2021-10-14 PROCEDURE — 7100000010 HC PHASE II RECOVERY - FIRST 15 MIN: Performed by: PODIATRIST

## 2021-10-14 PROCEDURE — 2709999900 HC NON-CHARGEABLE SUPPLY: Performed by: PODIATRIST

## 2021-10-14 PROCEDURE — 88311 DECALCIFY TISSUE: CPT

## 2021-10-14 PROCEDURE — 3600000012 HC SURGERY LEVEL 2 ADDTL 15MIN: Performed by: PODIATRIST

## 2021-10-14 PROCEDURE — 2500000003 HC RX 250 WO HCPCS: Performed by: PODIATRIST

## 2021-10-14 PROCEDURE — 93005 ELECTROCARDIOGRAM TRACING: CPT | Performed by: ANESTHESIOLOGY

## 2021-10-14 RX ORDER — OXYCODONE HYDROCHLORIDE AND ACETAMINOPHEN 5; 325 MG/1; MG/1
1 TABLET ORAL EVERY 6 HOURS PRN
Qty: 28 TABLET | Refills: 0 | Status: SHIPPED | OUTPATIENT
Start: 2021-10-14 | End: 2021-10-21

## 2021-10-14 RX ORDER — OXYCODONE HYDROCHLORIDE AND ACETAMINOPHEN 5; 325 MG/1; MG/1
1 TABLET ORAL EVERY 6 HOURS PRN
Qty: 28 TABLET | Refills: 0 | Status: SHIPPED | OUTPATIENT
Start: 2021-10-14 | End: 2021-10-14 | Stop reason: HOSPADM

## 2021-10-14 RX ORDER — LIDOCAINE HYDROCHLORIDE 10 MG/ML
INJECTION, SOLUTION INFILTRATION; PERINEURAL PRN
Status: DISCONTINUED | OUTPATIENT
Start: 2021-10-14 | End: 2021-10-14 | Stop reason: ALTCHOICE

## 2021-10-14 ASSESSMENT — ENCOUNTER SYMPTOMS
FACIAL SWELLING: 0
NAUSEA: 0
COUGH: 0
SHORTNESS OF BREATH: 0
PHOTOPHOBIA: 0
NAUSEA: 0
VOMITING: 0
EYE REDNESS: 0
FACIAL SWELLING: 0
VOMITING: 0
RHINORRHEA: 0
SHORTNESS OF BREATH: 0
ABDOMINAL PAIN: 0
EYE PAIN: 0
COLOR CHANGE: 0
DIARRHEA: 0
CHEST TIGHTNESS: 0
ABDOMINAL PAIN: 0
SORE THROAT: 0
BACK PAIN: 0
DIARRHEA: 0
SORE THROAT: 0

## 2021-10-14 ASSESSMENT — PAIN SCALES - GENERAL: PAINLEVEL_OUTOF10: 0

## 2021-10-14 NOTE — PROGRESS NOTES
Pt's PAT EKG indicated an acute STEMI, pt reports vague symptoms of heartburn and generalized pain. He states these issues are chronic so he dismissed them. He was taken to the ED immediately.    Electronically signed by PHILIP Holloway CNP on 10/14/2021 at 8:04 AM

## 2021-10-14 NOTE — PROGRESS NOTES
Called and spoke with Hina at Dr. Elmore Najjar office. Update given that patient is in ER from abnormal EKG. EKG faxed to their office.

## 2021-10-14 NOTE — BRIEF OP NOTE
PODIATRY OP NOTE    PATIENT NAME: Len De Anda  YOB: 1980  -  35 y.o. male  MRN: [id number]  DATE: 10/14/2021  BILLING #: [id number]    Surgeon(s):  Peter Vergara DPM     ASSISTANTS: Aminata Richard DPM PGY-1    PRE-OP DIAGNOSIS:   1. Osteomyelitis, 2nd digit, left foot  2. Gangrene, 2nd digit, left foot    POST-OP DIAGNOSIS: Same as above. PROCEDURE:   1. Partial second ray amputation, left foot    ANESTHESIA: Local    HEMOSTASIS: Direct pressure. ESTIMATED BLOOD LOSS: Less than 15cc. MATERIALS:   * No implants in log *    INJECTABLES: 10cc 0.5% marcaine plain pre-operatively. 30cc 1%lidocaine plain intraoperatively. SPECIMEN:   ID Type Source Tests Collected by Time Destination   A : LEFT SECOND TOE Tissue Toe SURGICAL PATHOLOGY Peter Vergara DPM 10/14/2021 5715        COMPLICATIONS: none    FINDINGS: necrotic 2nd toe noted. There was some bleeding during surgery. Patient tolerated procedure. The patient was counseled at length about the risks of alexsandra Covid-19 during their perioperative period and any recovery window from their procedure. The patient was made aware that alexsandra Covid-19  may worsen their prognosis for recovering from their procedure  and lend to a higher morbidity and/or mortality risk. All material risks, benefits, and reasonable alternatives including postponing the procedure were discussed. The patient does wish to proceed with the procedure at this time.     Aminata Richard DPM   Podiatric Medicine & Surgery   10/14/2021 at 7:54 PM

## 2021-10-14 NOTE — PROGRESS NOTES
Dr. Hakan South, anesthesia, was contacted and informed of patient's history and planned surgery. Reviewed ER visit from today, EKG, covid positive. Dr. Hakan South relates the case will be a local anesthesia.

## 2021-10-14 NOTE — TELEPHONE ENCOUNTER
Pt called in and stated he is in [facility] for his pre op testing. Pre op sent pt to ER to check his heart. In the Er he received a covid test that came back positive. Pt needs clinical staff to call him back ASAP to let him know if he needs to continue Pre op, or surgery if surgery is canceled due to his covid test. Writer was unable to reach clinical staff.

## 2021-10-14 NOTE — ED TRIAGE NOTES
Mode of arrival (squad #, walk in, police, etc) : Walk In        Chief complaint(s): Abnormal EKG        Arrival Note (brief scenario, treatment PTA, etc). : Pt arrives to ED from pre op testing with an abnormal EKG. Patient was in pre op for testing when an EKG was done and was reading STEMI. Patient denies any chest pain or shortness of breath. EKG obtained in ED and patient placed on cardiac monitor and continuous pulse oximetry. C= \"Have you ever felt that you should Cut down on your drinking? \"  No  A= \"Have people Annoyed you by criticizing your drinking? \"  No  G= \"Have you ever felt bad or Guilty about your drinking? \"  No  E= \"Have you ever had a drink as an Eye-opener first thing in the morning to steady your nerves or to help a hangover? \"  No      Deferred []      Reason for deferring: N/A    *If yes to two or more: probable alcohol abuse. *

## 2021-10-14 NOTE — PROGRESS NOTES
EKG done in pre testing, EKG shows ST elevation, ** ACUTE MI/STEMI **  Leads repositioned, next EKG showed the same reading. Patient denies chest pain, SOB. Patient states lately that he's had a \"lump\" in his throat, like something is choking him. The discomfort comes & goes, but last night it lasted for and hour. Called Dr. Norberto Obando, anesthesia, wants patient to see PCP today. Patient relates he has not seen PCP in a year. Patient strongly encouraged to go to ER. Spoke with Gordon Mcgowan CNP and walks patient to ER. Called ER, spoke with nurse informed off EKG and patient history.

## 2021-10-14 NOTE — H&P
Preop History and Physical  Podiatric Medicine and Surgery     Subjective     Chief Complaint: 2nd toe amputation, left foot    HPI:  Mayi Murrieta is a 35 y.o. male seen at [facility] for 2nd toe amputation, left foot . Patient has a necrotic wound on th left 2nd toe for quite some time. Denies other pedal complaints. PCP is Sara Rene    ROS:   Review of Systems   Constitutional: Negative. Negative for activity change, chills and fever. HENT: Negative. Negative for facial swelling and sore throat. Eyes: Negative for photophobia. Respiratory: Negative for chest tightness and shortness of breath. Cardiovascular: Negative for chest pain, palpitations and leg swelling. Gastrointestinal: Negative for abdominal pain, diarrhea, nausea and vomiting. Musculoskeletal: Negative for arthralgias, gait problem and joint swelling. Skin: Positive for wound (left 2nd toe). Neurological: Negative for weakness and headaches. Psychiatric/Behavioral: Negative for agitation and behavioral problems. Past Medical History   has a past medical history of Cancer ([facility] [address] Gangrene ([facility] [address] Headache, Hx of blood clots, Hypothyroidism, Osteomyelitis ([facility] [address] Papillary thyroid carcinoma ([facility] [address] and PVD (peripheral vascular disease) ([facility] [address] Past Surgical History   has a past surgical history that includes Total Thyroidectomy (Bilateral, 2017); IR US THYROID BIOPSY; and Colonoscopy (2020). Medications  Prior to Admission medications    Medication Sig Start Date End Date Taking? Authorizing Provider   ciprofloxacin (CIPRO) 500 MG tablet Take 1 tablet by mouth 2 times daily for 14 days 10/11/21 10/25/21  Suzan Garrett DPM   clindamycin (CLEOCIN) 300 MG capsule Take 1 capsule by mouth 4 times daily for 14 days 10/11/21 10/25/21  Suzan Garrett DPM   Gauze Pads & Dressings 2\"X2\" PADS Apply to left foot once daily.  8/26/21   Eva Temple DPM   Gauze Bandages (SOF-DEV CONFORMING BANDAGE) MISC Apply to left foot once daily. 21   Conception ALONDRA Segovia   rivaroxaban (XARELTO) 20 MG TABS tablet Take 1 tablet by mouth daily (with breakfast) 21   Lucia De Santiago MD   Povidone-Iodine 7.5 % SWAB Please apply to left 2nd digit daily with dressing change. 21   Conception ALONDRA Segovia   levothyroxine (SYNTHROID) 175 MCG tablet Once a day 20   Jan Collado MD   aspirin 81 MG tablet Take 81 mg by mouth daily    Historical Provider, MD   Cholecalciferol (VITAMIN D3) 2000 units TABS Take by mouth    Historical Provider, MD    Scheduled Meds:  Continuous Infusions:  PRN Meds:. Allergies  has No Known Allergies. Family History  family history includes Asthma in his brother; Cancer in his mother; High Blood Pressure in his father. Social History   reports that he has been smoking. He has been smoking about 0.50 packs per day. He has never used smokeless tobacco.   reports current alcohol use. reports current drug use. Drug: Marijuana. Objective     Vitals:  No data found. Average, Min, and Max for last 24 hours Vitals:  TEMPERATURE:  Temp  Av.1 °F (36.7 °C)  Min: 97.8 °F (36.6 °C)  Max: 98.3 °F (36.8 °C)    RESPIRATIONS RANGE: Resp  Av.2  Min: 9  Max: 17    PULSE RANGE: Pulse  Av.5  Min: 56  Max: 62    BLOOD PRESSURE RANGE:  Systolic (00CVB), HWP:293 , Min:116 , SAP:456   ; Diastolic (91CSO), JVC:41, Min:75, Max:91      PULSE OXIMETRY RANGE: SpO2  Av %  Min: 96 %  Max: 100 %  I&O:  No intake/output data recorded. CBC:  Recent Labs     10/14/21  0730 10/14/21  0818   WBC 13.2* 14.6*   HGB 17.5 17.9*   HCT 52.1 53.7*   * 664*        BMP:  Recent Labs     10/14/21  0730 10/14/21  0818    141   K 4.4 4.4    104   CO2 28 28   BUN 14 14   CREATININE 0.94 0.94   GLUCOSE 98 98   CALCIUM 9.8 9.8        Coags:  No results for input(s): APTT, PROT, INR in the last 72 hours.     No results found for: LABA1C  No results found for: SEDRATE   No results found for: CRP Physical Exam:    General: A&Ox3, NAD  Heart: Regular rate and rhythm. Lungs: Equal air entry. No increased effort. Abdomen: Soft, non-tender to palpation. Not distended. Lower Extremity Physical Exam:  Vascular: DP and PT pulses are palpable +1/4. CFT <3 seconds to all digits. Hair growth is absent to the level of the digits. Neuro: Saph/sural/SP/DP/plantar sensation diminished to light touch. Musculoskeletal: Muscle strength is 4/5 to all lower extremity muscle groups. Gross     Dermatologic: Necrotic tissue noted to the two third of left second toe. There is moisture noted to the left second toe at the interface of the gangrenous and healthy tissue. There is serous drainage noted to the left second toe, but no purulence is noted. There is pain with palpation and manipulation of the left second toe. Th    Imaging:   No orders to display         Assessment     Owen Uribe is a 35 y.o. male with   1. Gangrene of left foot  2. Osteomyelitis, 2nd toe, left foot    Active Problems:    * No active hospital problems. *  Resolved Problems:    * No resolved hospital problems. *       Plan     · Patient examined and evaluated at bedside   · Patient is going to 2nd toe left foot surgery today.    · Patient is NPO  · COVID is postive  · Consent signed  · Foot marked  · Morning AC held    Tayo Cage   Podiatric Medicine & Surgery   10/14/2021 at 6:15 PM

## 2021-10-14 NOTE — ED PROVIDER NOTES
EMERGENCY DEPARTMENT ENCOUNTER    Pt Name: Tamia Hernandez  MRN: [id number]  [place] 1980  Date of evaluation: 10/14/21  CHIEF COMPLAINT       Chief Complaint   Patient presents with    Abnormal Test Results     HISTORY OF PRESENT ILLNESS   HPI  24-year-old male history of thyroid cancer, DVT on Xarelto, osteomyelitis of the left great toe, peripheral vascular disease presents for evaluation from preoperative clinic with concern for an abnormal EKG. Patient had a screening EKG done for preoperative clearance with plan to amputate the great toe and the read was acute STEMI. Patient came into the ER for evaluation. He is asymptomatic. He is having no chest pain or shortness of breath. No nausea or diaphoresis. No cough. Otherwise feeling fine. No known cardiac history. REVIEW OF SYSTEMS     Review of Systems   Constitutional: Negative for chills and fatigue. HENT: Negative for facial swelling, nosebleeds, rhinorrhea and sore throat. Eyes: Negative for pain and redness. Respiratory: Negative for cough and shortness of breath. Cardiovascular: Negative for chest pain and leg swelling. Gastrointestinal: Negative for abdominal pain, diarrhea, nausea and vomiting. Genitourinary: Negative for flank pain and hematuria. Musculoskeletal: Negative for arthralgias and back pain. Skin: Negative for color change and rash. Neurological: Negative for dizziness, tremors, facial asymmetry, speech difficulty, weakness and numbness.      PASTMEDICAL HISTORY     Past Medical History:   Diagnosis Date    Cancer [facility])     Thyroid    Gangrene ([facility]     left 2nd toe    Headache     Hx of blood clots 08/2021    DVT, [facility]    Hypothyroidism     Osteomyelitis ([address]     Papillary thyroid carcinoma ([address] 2016    radiation and surgery    PVD (peripheral vascular disease) ([address]      Past Problem List  Patient Active Problem List   Diagnosis Code    Thyroid cancer ([facility] C73    Hypothyroidism E03.9    Papillary thyroid carcinoma (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    Tobacco abuse Z72.0    Cellulitis of third toe of left foot L03.032    Toe cyanosis R23.0     SURGICAL HISTORY       Past Surgical History:   Procedure Laterality Date    COLONOSCOPY  2020    IR US THYROID BIOPSY      TOTAL THYROIDECTOMY Bilateral 2017    31 lymph nodes removed, 3 total surgeries     CURRENT MEDICATIONS       Discharge Medication List as of 10/14/2021 10:50 AM      CONTINUE these medications which have NOT CHANGED    Details   ciprofloxacin (CIPRO) 500 MG tablet Take 1 tablet by mouth 2 times daily for 14 days, Disp-28 tablet, R-0Normal      clindamycin (CLEOCIN) 300 MG capsule Take 1 capsule by mouth 4 times daily for 14 days, Disp-56 capsule, R-0Normal      Gauze Pads & Dressings 2\"X2\" PADS Disp-30 each, R-3, NormalApply to left foot once daily. Gauze Bandages (SOF-DEV CONFORMING BANDAGE) MISC Disp-96 each, R-3, NormalApply to left foot once daily. rivaroxaban (XARELTO) 20 MG TABS tablet Take 1 tablet by mouth daily (with breakfast), Disp-30 tablet, R-2Normal      Povidone-Iodine 7.5 % SWAB Please apply to left 2nd digit daily with dressing change., Disp-50 each, R-1Normal      levothyroxine (SYNTHROID) 175 MCG tablet Once a day, Disp-30 tablet, R-11Normal      aspirin 81 MG tablet Take 81 mg by mouth dailyHistorical Med      Cholecalciferol (VITAMIN D3) 2000 units TABS Take by mouthHistorical Med           ALLERGIES     has No Known Allergies. FAMILY HISTORY     He indicated that the status of his mother is unknown. He indicated that the status of his father is unknown. He indicated that the status of his brother is unknown. SOCIAL HISTORY       Social History     Tobacco Use    Smoking status: Current Every Day Smoker     Packs/day: 0.50    Smokeless tobacco: Never Used    Tobacco comment: trying to cut back   Substance Use Topics    Alcohol use: Yes     Comment: rare    Drug use:  Yes Types: Marijuana     Comment: daily     PHYSICAL EXAM     INITIAL VITALS: /85   Pulse 56   Temp 98.3 °F (36.8 °C) (Oral)   Resp 9   Ht 6' 1\" (1.854 m)   Wt 230 lb (104.3 kg)   SpO2 97%   BMI 30.34 kg/m²    Physical Exam  Vitals and nursing note reviewed. Constitutional:       Appearance: Normal appearance. HENT:      Head: Normocephalic and atraumatic. Nose: Nose normal.   Eyes:      Extraocular Movements: Extraocular movements intact. Pupils: Pupils are equal, round, and reactive to light. Cardiovascular:      Rate and Rhythm: Normal rate and regular rhythm. Pulmonary:      Effort: Pulmonary effort is normal. No respiratory distress. Breath sounds: Normal breath sounds. Abdominal:      General: Abdomen is flat. There is no distension. Palpations: Abdomen is soft. There is no mass. Musculoskeletal:         General: No swelling. Normal range of motion. Cervical back: Normal range of motion. No rigidity. Skin:     General: Skin is warm and dry. Neurological:      General: No focal deficit present. Mental Status: He is alert. Mental status is at baseline. Cranial Nerves: No cranial nerve deficit. MEDICAL DECISION MAKIN-year-old male presents for evaluation with abnormal EKG. Reviewed EKG it looks like there are some repolarization changes in the anterolateral leads which I think is what triggered the rate of an acute STEMI. Repeated EKG here similar appearance. No prior to compare. Discussed with cardiology and they agree with benign repolarization changes. Troponins were negative x2. Chest x-ray did show some bibasilar changes this prompted a Covid test which was positive. Patient had normal vital signs and was feeling well think he is safe for discharge with home quarantine.          CRITICAL CARE:       PROCEDURES:    Procedures    DIAGNOSTIC RESULTS   EKG:All EKG's are interpreted by the Emergency Department Physician who either signs or Co-signs this chart in the absence of a cardiologist.    Sinus rhythm rate of 60 normal axis normal intervals repolarization changes in anterolateral leads    RADIOLOGY:All plain film, CT, MRI, and formal ultrasound images (except ED bedside ultrasound) are read by the radiologist, see reports below, unless otherwisenoted in MDM or here. XR CHEST PORTABLE   Final Result   Subtle bibasilar infiltrates concerning for pneumonia. LABS: All lab results were reviewed by myself, and all abnormals are listed below. Labs Reviewed   COVID-19, RAPID - Abnormal; Notable for the following components:       Result Value    SARS-CoV-2, Rapid DETECTED (*)     All other components within normal limits   CBC WITH AUTO DIFFERENTIAL - Abnormal; Notable for the following components:    WBC 14.6 (*)     RBC 6.02 (*)     Hemoglobin 17.9 (*)     Hematocrit 53.7 (*)     RDW 15.3 (*)     Platelets 953 (*)     Seg Neutrophils 70 (*)     Lymphocytes 20 (*)     Segs Absolute 10.10 (*)     Absolute Eos # 0.50 (*)     All other components within normal limits   BASIC METABOLIC PANEL W/ REFLEX TO MG FOR LOW K   TROPONIN       EMERGENCY DEPARTMENTCOURSE:         Vitals:    Vitals:    10/14/21 0802 10/14/21 0815 10/14/21 0830 10/14/21 0845   BP: 129/75 (!) 116/91 (!) 129/90 121/85   Pulse:  59 62 56   Resp:  16 10 9   Temp: 98.3 °F (36.8 °C)      TempSrc: Oral      SpO2:  96% 96% 97%   Weight:       Height:           The patient was given the following medications while in the emergency department:  No orders of the defined types were placed in this encounter. CONSULTS:  IP CONSULT TO CARDIOLOGY    FINAL IMPRESSION      1. Nonspecific ST-T wave electrocardiographic changes    2.  COVID-19          DISPOSITION/PLAN   DISPOSITION Decision To Discharge 10/14/2021 10:49:50 AM      PATIENT REFERRED TO:  Saritha Dominique    Call   As needed    DISCHARGE MEDICATIONS:  Discharge Medication List as of 10/14/2021 10:50 AM        The care is provided during an unprecedented national emergency due to the novel coronavirus, COVID 19.   Diane Jaffe MD  Attending Emergency Physician                    Diane Jaffe MD  10/14/21 5422

## 2021-10-15 ENCOUNTER — CARE COORDINATION (OUTPATIENT)
Dept: CARE COORDINATION | Age: 41
End: 2021-10-15

## 2021-10-15 LAB
EKG ATRIAL RATE: 60 BPM
EKG P AXIS: 10 DEGREES
EKG P-R INTERVAL: 182 MS
EKG Q-T INTERVAL: 402 MS
EKG QRS DURATION: 86 MS
EKG QTC CALCULATION (BAZETT): 402 MS
EKG R AXIS: 40 DEGREES
EKG T AXIS: 39 DEGREES
EKG VENTRICULAR RATE: 60 BPM

## 2021-10-15 PROCEDURE — 93010 ELECTROCARDIOGRAM REPORT: CPT | Performed by: INTERNAL MEDICINE

## 2021-10-15 NOTE — OP NOTE
PODIATRY OP NOTE    PATIENT NAME: Lucien Carbajal  YOB: 1980  -  35 y.o. male  MRN: [id number]  DATE: 10/14/2021  BILLING #: [id number]    Surgeon(s):  Dennis Hill DPM     ASSISTANTS: Lauren Bonilla DPM PGY-1    PRE-OP DIAGNOSIS:   1. Osteomyelitis, 2nd digit, left foot  2. Gangrene, 2nd digit, left foot    POST-OP DIAGNOSIS: Same as above. PROCEDURE:   1. Partial second ray amputation, left foot    ANESTHESIA: Local    HEMOSTASIS: Direct pressure. ESTIMATED BLOOD LOSS: Less than 15cc. MATERIALS:   * No implants in log *    INJECTABLES: 10cc 0.5% marcaine plain pre-operatively. 30cc 1%lidocaine plain intraoperatively. SPECIMEN:   ID Type Source Tests Collected by Time Destination   A : LEFT SECOND TOE Tissue Toe SURGICAL PATHOLOGY Dennis Hill DPM 10/14/2021 [id number]        COMPLICATIONS: none    FINDINGS: necrotic 2nd toe noted. There was some bleeding during surgery. Patient tolerated procedure. INDICATION FOR PROCEDURE: The patient is a 75-year-old male who has had an infection of the 2nd digit for some time. All treatment options were thoroughly discussed with the patient and it was decided to move forward with partial 2nd ray amputaion of the left foot. This has failed conservative treatments thus far, and the patient elects to undergo surgical correction. All risks and benefits discussed with the patient in detail. No guarantees were given or implied. Consent signed and in the chart. Covid is negative. PROCEDURE IN DETAIL:  Patient was transported from inpatient room to the operating room and placed on the operating table in the supine position with a safety strap across the lap. A local block of 10 cc 0.5% Marcaine plain was injected. The foot was then prepped and draped in the usual aseptic fashion. A timeout was performed confirming the correct patient, correct procedure, correct site, preoperative antibiotics, everyone in the room was in agreement.      Attention was directed to the dorsal aspect of the left 2nd digit. There was a full-thickness wound with a necrotic-fibrotic cap to the distal aspect of the digit with purulent drainage appreciated. A #15 blade was used to create a circumferential full-thickness incision around the proximal phalanx base. The wound was then excised revealing the underlying 2nd metatarsal head. The 2nd digit was noted to be soft and necrotic upon bone quality testing consistent with osteomyelitis. The  2nd digit was disarticulated at the level of the 2nd MPJ and passed from the table as specimen to be sent for pathology and culture. All non-viable tissue was sharply excised. There was adequate bleeding appreciated. Vitalized and viable tissue noted surrounding the 2nd MPJ complex. A sagittal saw was used to resect the the 2nd metatarsal head. The remnant 2nd ray was meticulously inspected and found white, hard. Next the surgical site was irrigated with copious amounts of sterile saline. The surgical incision margins were then remodeled to allow for adequate closure. The surgical site was closed in layers utilizing 3-0 nylon for skin closure. A penrose is placed    Dressings consisted of adaptic, 4 x 4s, ABD, Kerlix and an Ace bandage were applied. The patient tolerated the above procedure and anesthesia well without complications. The patient was transported from the operating room to the PACU with vital signs stable and neurovascular status intact to the left foot. Patient was then transferred back to the floor. The patient was counseled at length about the risks of alexsandra Covid-19 during their perioperative period and any recovery window from their procedure. The patient was made aware that alexsandra Covid-19  may worsen their prognosis for recovering from their procedure  and lend to a higher morbidity and/or mortality risk.   All material risks, benefits, and reasonable alternatives including postponing the procedure were discussed. The patient does wish to proceed with the procedure at this time.     Renee Doty DPM   Podiatric Medicine & Surgery   10/14/2021 at 8:34 PM

## 2021-10-15 NOTE — CARE COORDINATION
Patient contacted regarding COVID-19 diagnosis. Discussed COVID-19 related testing which was available at this time. Test results were positive. Patient informed of results, if available? Yes. Ambulatory Care Manager contacted the patient by telephone to perform post discharge assessment. Call within 2 business days of discharge: Yes. Verified name and  with patient as identifiers. Provided introduction to self, and explanation of the CTN/ACM role, and reason for call due to risk factors for infection and/or exposure to COVID-19. Symptoms reviewed with patient who verbalized the following symptoms: loss of taste or smell. Due to no new or worsening symptoms encounter was not routed to provider for escalation. Discussed follow-up appointments. If no appointment was previously scheduled, appointment scheduling offered: No.  [facility] follow up appointment(s):   Future Appointments   Date Time Provider Maryuri Ferguson   10/18/2021  2:30 PM Navneet Lagunas DPM [facility]   10/29/2021 11:00 AM Navneet Lagunas DPM [facility]   2021  3:15 PM Nanci Hadley MD [facility] follow up appointment(s):      Non-face-to-face services provided:  Reviewed and followed up on pending diagnostic tests and treatments-Covid-19 test, ED Chest xray     Advance Care Planning:   Does patient have an Advance Directive:  reviewed and current. Educated patient about risk for severe COVID-19 due to risk factors according to CDC guidelines. ACM reviewed discharge instructions, medical action plan and red flag symptoms with the patient who verbalized understanding. Discussed COVID vaccination status: Yes. Education provided on COVID-19 vaccination as appropriate. Discussed exposure protocols and quarantine with CDC Guidelines.  Patient was given an opportunity to verbalize any questions and concerns and agrees to contact ACM or health care provider for questions related to their healthcare. Reviewed and educated patient on any new and changed medications related to discharge diagnosis     Was patient discharged with a pulse oximeter? No Discussed and confirmed pulse oximeter discharge instructions and when to notify provider or seek emergency care. ACM provided contact information. No further follow-up call identified based on severity of symptoms and risk factors. Steps to help prevent the spread of COVID-19 if you are sick  SOURCE - [email or web address].info/. html     Stay home except to get medical care   ; Stay home: People who are mildly ill with COVID-19 are able to isolate at home during their illness. You should restrict activities outside your home, except for getting medical care.   ; Avoid public areas: Do not go to work, school, or public areas.   ; Avoid public transportation: Avoid using public transportation, ride-sharing, or taxis.  ; Separate yourself from other people and animals in your home   ; Stay away from others: As much as possible, you should stay in a specific room and away from other people in your home. Also, you should use a separate bathroom, if available.   ; Limit contact with pets & animals: You should restrict contact with pets and other animals while you are sick with COVID-19, just like you would around other people. Although there have not been reports of pets or other animals becoming sick with COVID-19, it is still recommended that people sick with COVID-19 limit contact with animals until more information is known about the virus. ; When possible, have another member of your household care for your animals while you are sick. If you are sick with COVID-19, avoid contact with your pet, including petting, snuggling, being kissed or licked, and sharing food.  If you must care for your pet or be around animals while you are sick, wash your hands before and after you interact with pets and wear a facemask. See COVID-19 and Animals for more information. Other considerations   The ill person should eat/be fed in their room if possible. Non-disposable  items used should be handled with gloves and washed with hot water or in a . Clean hands after handling used  items.  If possible, dedicate a lined trash can for the ill person. Use gloves when removing garbage bags, handling, and disposing of trash. Wash hands after handling or disposing of trash.  Consider consulting with your local health department about trash disposal guidance if available. Information for Household Members and Caregivers of Someone who is Sick   Call ahead before visiting your doctor   Call ahead: If you have a medical appointment, call the healthcare provider and tell them that you have or may have COVID-19. This will help the healthcare provider's office take steps to keep other people from getting infected or exposed. Wear a facemask if you are sick   ; If you are sick: You should wear a facemask when you are around other people (e.g., sharing a room or vehicle) or pets and before you enter a healthcare provider's office. ; If you are caring for others: If the person who is sick is not able to wear a facemask (for example, because it causes trouble breathing), then people who live with the person who is sick should not stay in the same room with them, or they should wear a facemask if they enter a room with the person who is sick. Cover your coughs and sneezes   ; Cover: Cover your mouth and nose with a tissue when you cough or sneeze.   ; Dispose: Throw used tissues in a lined trash can.   ; Wash hands: Immediately wash your hands with soap and water for at least 20 seconds or, if soap and water are not available, clean your hands with an alcohol-based hand  that contains at least 60% alcohol. Clean your hands often   ;  Wash hands: Wash your hands often with soap and water for at least 20 seconds, especially after blowing your nose, coughing, or sneezing; going to the bathroom; and before eating or preparing food.   ; Hand : If soap and water are not readily available, use an alcohol-based hand  with at least 60% alcohol, covering all surfaces of your hands and rubbing them together until they feel dry.   ; Soap and water: Soap and water are the best option if hands are visibly dirty.   ; Avoid touching: Avoid touching your eyes, nose, and mouth with unwashed hands. Handwashing Tips   ; Wet your hands with clean, running water (warm or cold), turn off the tap, and apply soap.  ; Lather your hands by rubbing them together with the soap. Lather the backs of your hands, between your fingers, and under your nails. ; Scrub your hands for at least 20 seconds. Need a timer? Hum the [place] from beginning to end twice.  ; Rinse your hands well under clean, running water.  ; Dry your hands using a clean towel or air dry them. Avoid sharing personal household items   ; Do not share: You should not share dishes, drinking glasses, cups, eating utensils, towels, or bedding with other people or pets in your home.   ; Wash thoroughly after use: After using these items, they should be washed thoroughly with soap and water. Clean all high-touch surfaces everyday   ; Clean and disinfect: Practice routine cleaning of high touch surfaces.  ; High touch surfaces include counters, tabletops, doorknobs, bathroom fixtures, toilets, phones, keyboards, tablets, and bedside tables.  ; Disinfect areas with bodily fluids: Also, clean any surfaces that may have blood, stool, or body fluids on them.   ; Household : Use a household cleaning spray or wipe, according to the label instructions.  Labels contain instructions for safe and effective use of the cleaning product including precautions you should take when applying the product, such as wearing gloves and making sure you have good ventilation during use of the product. Monitor your symptoms   Seek medical attention: Seek prompt medical attention if your illness is worsening     (e.g., difficulty breathing).   ; Call your doctor: Before seeking care, call your healthcare provider and tell them that you have, or are being evaluated for, COVID-19.   ; Wear a facemask when sick: Put on a facemask before you enter the facility. These steps will help the healthcare provider's office to keep other people in the office or waiting room from getting infected or exposed. ; Alert health department: Ask your healthcare provider to call the local or state health department. Persons who are placed under active monitoring or facilitated self-monitoring should follow instructions provided by their local health department or occupational health professionals, as appropriate.  ; Call 911 if you have a medical emergency: If you have a medical emergency and need to call 911, notify the dispatch personnel that you have, or are being evaluated for COVID-19. If possible, put on a facemask before emergency medical services arrive.

## 2021-10-15 NOTE — BRIEF OP NOTE
PODIATRY BRIEF OP NOTE    PATIENT NAME: Lanie Meneses  YOB: 1980  -  35 y.o. male  MRN: [id number]  DATE: 10/14/2021  BILLING #: [id number]    Surgeon(s):  Angelic Dong DPM     ASSISTANTS: [place] DPM PGY-1    PRE-OP DIAGNOSIS:   1. Osteomyelitis, 2nd digit, left foot  2. Gangrene, 2nd digit, left foot    POST-OP DIAGNOSIS: Same as above. PROCEDURE:   1. Partial second ray amputation, left foot    ANESTHESIA: Local    HEMOSTASIS: Direct pressure. ESTIMATED BLOOD LOSS: Less than 15cc. MATERIALS:   * No implants in log *    INJECTABLES: 10cc 0.5% marcaine plain pre-operatively. 30cc 1%lidocaine plain intraoperatively. SPECIMEN:   ID Type Source Tests Collected by Time Destination   A : LEFT SECOND TOE Tissue Toe SURGICAL PATHOLOGY Angelic Dong DPM 10/14/2021 7650        COMPLICATIONS: none    FINDINGS: necrotic 2nd toe noted. There was some bleeding during surgery. Patient tolerated procedure. INDICATION FOR PROCEDURE: The patient is a 42-year-old male who has had an infection of the left 2nd digit for some time. Patient has had a necrotic wound for quite sometimes. All treatment options were thoroughly discussed with the patient and it was decided to move forward with amputation of the 2nd digit of left foot. This has failed conservative treatments thus far, and the patient elects to undergo surgical correction. All risks and benefits discussed with the patient in detail. No guarantees were given or implied. Consent signed and in the chart. Covid is negative. PROCEDURE IN DETAIL: Patient was transported from inpatient room to the operating room and placed on the operating table in the supine position with a safety strap across the lap. A local block of 10 cc of one-to-one mixture of 0.5% Marcaine plain injected. The foot was then prepped and draped in the usual aseptic fashion.   A timeout was performed confirming the correct patient, correct procedure, correct site, preoperative antibiotics, everyone in the room was in agreement. Attention was directed to the dorsal aspect of the left 2nd digit. There was a full-thickness wound with a necrotic-fibrotic cap to the distal aspect of the digit with purulent drainage appreciated. Another 30cc of 1% lidocaine plain was injected. A #15 blade was used to create a circumferential full-thickness incision around the wound/ around the proximal phalanx base. The wound was then excised revealing the underlying 2nd metatarsal head. The 2nd digit was noted to be soft and necrotic upon bone quality testing consistent with osteomyelitis. The 2nd digit was disarticulated at the level of the 2nd MPJ and passed from the table as specimen to be sent for pathology and culture. All non-viable tissue was sharply excised. There was adequate bleeding appreciated. Vitalized and viable tissue noted surrounding the 2nd MPJ complex. A sagittal saw was used to resect the 2nd metatarsal head. The remnant 2nd ray was meticulously inspected which shows white and hard bone. Next the surgical site was irrigated with copious amounts of sterile saline. The surgical incision margins were then remodeled to allow for adequate closure. The surgical site was closed in layers utilizing 3-0 nylon for skin closure. A penrose was placed for drainage. Dressings consisted of adaptic, 4 x 4s, ABD, Kerlix and an Ace bandage were applied. The patient tolerated the above procedure well without complications. The patient was transported from the operating room to the PACU with vital signs stable and neurovascular status intact to the left foot. Patient was then transferred back to the floor. The patient was counseled at length about the risks of alexsandra Covid-19 during their perioperative period and any recovery window from their procedure.   The patient was made aware that alexsandra Covid-19  may worsen their prognosis for recovering from their procedure and lend to a higher morbidity and/or mortality risk. All material risks, benefits, and reasonable alternatives including postponing the procedure were discussed. The patient does wish to proceed with the procedure at this time.     Edin Perez DPM   Podiatric Medicine & Surgery   10/14/2021 at 8:17 PM

## 2021-10-18 ENCOUNTER — OFFICE VISIT (OUTPATIENT)
Dept: PODIATRY | Age: 41
End: 2021-10-18

## 2021-10-18 VITALS — HEIGHT: 73 IN | WEIGHT: 230 LBS | BODY MASS INDEX: 30.48 KG/M2

## 2021-10-18 DIAGNOSIS — I96 GANGRENE OF TOE OF LEFT FOOT (HCC): Primary | ICD-10-CM

## 2021-10-18 DIAGNOSIS — L08.9 INFECTION OF TOE: ICD-10-CM

## 2021-10-18 DIAGNOSIS — M86.9 OSTEOMYELITIS OF SECOND TOE OF LEFT FOOT (HCC): ICD-10-CM

## 2021-10-18 DIAGNOSIS — I73.9 PERIPHERAL VASCULAR DISORDER (HCC): ICD-10-CM

## 2021-10-18 DIAGNOSIS — M79.672 PAIN IN LEFT FOOT: ICD-10-CM

## 2021-10-18 DIAGNOSIS — S98.132A AMPUTATION OF TOE OF LEFT FOOT (HCC): ICD-10-CM

## 2021-10-18 LAB — SURGICAL PATHOLOGY REPORT: NORMAL

## 2021-10-18 PROCEDURE — 99024 POSTOP FOLLOW-UP VISIT: CPT | Performed by: PODIATRIST

## 2021-10-18 RX ORDER — OXYCODONE HYDROCHLORIDE AND ACETAMINOPHEN 5; 325 MG/1; MG/1
1 TABLET ORAL EVERY 4 HOURS PRN
Qty: 30 TABLET | Refills: 0 | Status: SHIPPED | OUTPATIENT
Start: 2021-10-18 | End: 2021-10-25

## 2021-10-18 NOTE — PROGRESS NOTES
Subjective: Patient presents to the office today status-post amputation of the left second toe. Patient states they have kept the dressing to the left lower extremity clean, dry, and intact since the surgery. Patient states that he has worn his post op shoe on his left foot since the surgery. Patient states that their pain has been well-controlled with the prescribed pain medication. Patient states that they have been taking their oral antibiotics as prescribed. Patient denies any fever, chills, nausea, vomiting, or night sweats. Review of Systems   Constitutional: Negative for activity change, appetite change, chills, diaphoresis, fatigue and fever. Respiratory: Negative for shortness of breath. Cardiovascular: Negative for leg swelling. Gastrointestinal: Negative for diarrhea and nausea. Endocrine: Negative for cold intolerance, heat intolerance and polyuria. Musculoskeletal: Positive for gait problem. Negative for arthralgias, back pain, joint swelling and myalgias. Skin: Positive for wound. Negative for color change, pallor and rash. Allergic/Immunologic: Negative for environmental allergies and food allergies. Neurological: Negative for dizziness, weakness, light-headedness and numbness. Hematological: Does not bruise/bleed easily. Psychiatric/Behavioral: Negative for behavioral problems, confusion and self-injury. The patient is not nervous/anxious. Objective: Clinical evaluation of the patient reveals dressing to the left lower extremity to be clean, dry, and intact. Removal of the dressing reveals incision to be well-coapted and the sutures intact. There is erythema surrounding the incision site, but it does not extend beyond this area. There is no calor, drainage, or malodor noted to the surgical site. There is moderate non-pitting edema present to the left lower extremity. Assessment:    Diagnosis Orders   1.  Gangrene of toe of left foot ([facility] [address]  oxyCODONE-acetaminophen (PERCOCET) 5-325 MG per tablet   2. Osteomyelitis of second toe of left foot (HCC)  oxyCODONE-acetaminophen (PERCOCET) 5-325 MG per tablet   3. Infection of toe  oxyCODONE-acetaminophen (PERCOCET) 5-325 MG per tablet   4. Amputation of toe of left foot (HCC)  oxyCODONE-acetaminophen (PERCOCET) 5-325 MG per tablet   5. Pain in left foot  oxyCODONE-acetaminophen (PERCOCET) 5-325 MG per tablet   6. Peripheral vascular disorder (HCC)  oxyCODONE-acetaminophen (PERCOCET) 5-325 MG per tablet     Plan: Antibiotic ointment was placed along the incision and a dry sterile dressing was applied. The patient was instructed to keep the dressing clean, dry, and intact until the next visit. Patient to continue to wear his post op shoe on his left foot when WB. Patient given a prescription for Percocet. The patient is to follow up in the office as previously scheduled for likely suture removal. Return in about 11 days (around 10/29/2021) for Second postoperative evaluation.    10/18/2021      Verline Nageotte, DPM

## 2021-10-19 ASSESSMENT — ENCOUNTER SYMPTOMS
BACK PAIN: 0
COLOR CHANGE: 0
DIARRHEA: 0
SHORTNESS OF BREATH: 0
NAUSEA: 0

## 2021-10-29 ENCOUNTER — OFFICE VISIT (OUTPATIENT)
Dept: PODIATRY | Age: 41
End: 2021-10-29

## 2021-10-29 VITALS — WEIGHT: 215 LBS | BODY MASS INDEX: 28.49 KG/M2 | HEIGHT: 73 IN

## 2021-10-29 DIAGNOSIS — S98.132A AMPUTATION OF TOE OF LEFT FOOT (HCC): ICD-10-CM

## 2021-10-29 DIAGNOSIS — Z98.890 POST-OPERATIVE STATE: ICD-10-CM

## 2021-10-29 DIAGNOSIS — I73.9 PERIPHERAL VASCULAR DISORDER (HCC): ICD-10-CM

## 2021-10-29 DIAGNOSIS — M79.672 PAIN IN LEFT FOOT: ICD-10-CM

## 2021-10-29 DIAGNOSIS — I96 GANGRENE OF TOE OF LEFT FOOT (HCC): Primary | ICD-10-CM

## 2021-10-29 DIAGNOSIS — L08.9 INFECTION OF TOE: ICD-10-CM

## 2021-10-29 DIAGNOSIS — M86.9 OSTEOMYELITIS OF SECOND TOE OF LEFT FOOT (HCC): ICD-10-CM

## 2021-10-29 PROCEDURE — 99024 POSTOP FOLLOW-UP VISIT: CPT | Performed by: PODIATRIST

## 2021-10-29 RX ORDER — DOXYCYCLINE HYCLATE 100 MG/1
100 CAPSULE ORAL DAILY
Qty: 10 CAPSULE | Refills: 0 | Status: SHIPPED | OUTPATIENT
Start: 2021-10-29 | End: 2021-11-08

## 2021-10-29 RX ORDER — OXYCODONE HYDROCHLORIDE AND ACETAMINOPHEN 5; 325 MG/1; MG/1
1 TABLET ORAL EVERY 4 HOURS PRN
Qty: 30 TABLET | Refills: 0 | Status: SHIPPED | OUTPATIENT
Start: 2021-10-29 | End: 2021-11-05

## 2021-10-29 NOTE — PROGRESS NOTES
Subjective: Patient presents to the office today for their second post-operative evaluation of amputation of the left second toe. Patient states that they have kept the dressing to the left lower extremity clean, dry, and intact since last visit. Patient states that he has worn his post op shoe on his left foot as instructed. Patient states that their pain continues to be well controlled with the prescribed pain medication. Review of Systems   Constitutional: Negative for activity change, appetite change, chills, diaphoresis, fatigue and fever. Respiratory: Negative for shortness of breath. Cardiovascular: Negative for leg swelling. Gastrointestinal: Negative for diarrhea and nausea. Endocrine: Negative for cold intolerance, heat intolerance and polyuria. Musculoskeletal: Positive for gait problem. Negative for arthralgias, back pain, joint swelling and myalgias. Skin: Positive for wound. Negative for color change, pallor and rash. Allergic/Immunologic: Negative for environmental allergies and food allergies. Neurological: Negative for dizziness, weakness, light-headedness and numbness. Hematological: Does not bruise/bleed easily. Psychiatric/Behavioral: Negative for behavioral problems, confusion and self-injury. The patient is not nervous/anxious. Objective: Clinical evaluation of the patient reveals dressing to the left lower extremity to be clean, dry, and intact. Removal of the dressing reveals incision to be well co-apted and the sutures intact. Tension placed along the incision line does not produce any gapping. There is mild erythema remaining around the incision site, but it does not extend beyond this area. There is no calor, drainage, or malodor noted to the surgical site. There remains mild nonpitting edema to the left lower extremity. The sutures were removed and tension was once again placed along the incision line.  At this point, dehiscence is noted to the wound at the central aspect of the incision. This wound is to the depth of the subcutaneous tissue, with no penetration to the bone. This wound measures 1.9 cm x 0.9 cm x 0.4 cm. Assessment:    Diagnosis Orders   1. Gangrene of toe of left foot (HCC)  doxycycline hyclate (VIBRAMYCIN) 100 MG capsule    oxyCODONE-acetaminophen (PERCOCET) 5-325 MG per tablet   2. Osteomyelitis of second toe of left foot (HCC)  doxycycline hyclate (VIBRAMYCIN) 100 MG capsule    oxyCODONE-acetaminophen (PERCOCET) 5-325 MG per tablet   3. Infection of toe  doxycycline hyclate (VIBRAMYCIN) 100 MG capsule    oxyCODONE-acetaminophen (PERCOCET) 5-325 MG per tablet   4. Amputation of toe of left foot (HCC)  doxycycline hyclate (VIBRAMYCIN) 100 MG capsule    oxyCODONE-acetaminophen (PERCOCET) 5-325 MG per tablet   5. Pain in left foot  doxycycline hyclate (VIBRAMYCIN) 100 MG capsule    oxyCODONE-acetaminophen (PERCOCET) 5-325 MG per tablet   6. Peripheral vascular disorder (HCC)  doxycycline hyclate (VIBRAMYCIN) 100 MG capsule    oxyCODONE-acetaminophen (PERCOCET) 5-325 MG per tablet   7. Post-operative state  doxycycline hyclate (VIBRAMYCIN) 100 MG capsule    oxyCODONE-acetaminophen (PERCOCET) 5-325 MG per tablet     Plan: The wound to the left foot was dressed with antibiotic ointment and a DSD. Patient to change this dressing daily. Patient given a prescription for doxycycline and for Percocet. Return in about 1 week (around 11/5/2021) for Wound Care.      Raphael Malloy DPM

## 2021-11-01 ASSESSMENT — ENCOUNTER SYMPTOMS
COLOR CHANGE: 0
DIARRHEA: 0
NAUSEA: 0
SHORTNESS OF BREATH: 0
BACK PAIN: 0

## 2021-11-05 ENCOUNTER — OFFICE VISIT (OUTPATIENT)
Dept: PODIATRY | Age: 41
End: 2021-11-05
Payer: MEDICARE

## 2021-11-05 VITALS — BODY MASS INDEX: 28.49 KG/M2 | HEIGHT: 73 IN | WEIGHT: 215 LBS

## 2021-11-05 DIAGNOSIS — I96 GANGRENE OF TOE OF LEFT FOOT (HCC): Primary | ICD-10-CM

## 2021-11-05 DIAGNOSIS — M79.672 PAIN IN LEFT FOOT: ICD-10-CM

## 2021-11-05 DIAGNOSIS — S98.132A AMPUTATION OF TOE OF LEFT FOOT (HCC): ICD-10-CM

## 2021-11-05 DIAGNOSIS — L08.9 INFECTION OF TOE: ICD-10-CM

## 2021-11-05 DIAGNOSIS — M86.9 OSTEOMYELITIS OF SECOND TOE OF LEFT FOOT (HCC): ICD-10-CM

## 2021-11-05 DIAGNOSIS — I73.9 PERIPHERAL VASCULAR DISORDER (HCC): ICD-10-CM

## 2021-11-05 DIAGNOSIS — Z98.890 POST-OPERATIVE STATE: ICD-10-CM

## 2021-11-05 PROCEDURE — 99024 POSTOP FOLLOW-UP VISIT: CPT | Performed by: PODIATRIST

## 2021-11-05 PROCEDURE — 11042 DBRDMT SUBQ TIS 1ST 20SQCM/<: CPT | Performed by: PODIATRIST

## 2021-11-08 ASSESSMENT — ENCOUNTER SYMPTOMS
DIARRHEA: 0
NAUSEA: 0
COLOR CHANGE: 0
BACK PAIN: 0
SHORTNESS OF BREATH: 0

## 2021-11-08 NOTE — PROGRESS NOTES
Follow-Up Wound Progress Note   Joshua Treviño  AGE: 35 y.o. GENDER: male  : 1980  TODAY'S DATE:  2021  Subjective:    Joshua Treviño is a 35 y.o. male who presents today for wound check. Wound Location : Left foot. The patients pain is 4 out of 10. Patient states that they have been changing the dressing to the left lower extremity(s) with Silver cell and a DSD daily as instructed since last visit. Modifying factors: Post op wound dehiscence. Review of Systems   Constitutional: Negative for activity change, appetite change, chills, diaphoresis, fatigue and fever. Respiratory: Negative for shortness of breath. Cardiovascular: Negative for leg swelling. Gastrointestinal: Negative for diarrhea and nausea. Endocrine: Negative for cold intolerance, heat intolerance and polyuria. Musculoskeletal: Positive for gait problem. Negative for arthralgias, back pain, joint swelling and myalgias. Skin: Positive for wound. Negative for color change, pallor and rash. Allergic/Immunologic: Negative for environmental allergies and food allergies. Neurological: Negative for dizziness, weakness, light-headedness and numbness. Hematological: Does not bruise/bleed easily. Psychiatric/Behavioral: Negative for behavioral problems, confusion and self-injury. The patient is not nervous/anxious. Objective:   Exam:  There is a full thickness wound to the dorsal aspect of the left foot secondary to dehiscence from amputation of the second toe. There is edema noted to the left foot. There is mild erythema surrounding the wound, but no calor is noted. The wound base is granular with overlying wound slough. The wound is moist, but no active drainage or malodor is noted. Debridement of the wound with a curette produces adequate bleeding. There is no tunneling or undermining noted to the wound. The wound measures 1.9 cm x 0.9 cm x 0.4 cm. Procedure:   The wound(s) to the left foot was debrided with removal of fibrotic, necrotic, and hyperkeratotic tissue, including a layer of surrounding healthy tissue down through and including subcutaneous tissue,using curette. Excisional Debridement  Percent of Wound Debrided: 100%    Wound: is unchanged    Bleeding: Minimal    Hemostasis:   by pressure    Response to treatment:  Well tolerated by patient. Assessment:      Diagnosis Orders   1. Gangrene of toe of left foot ([facility])  MT DEBRIDEMENT, SKIN, SUB-Q TISSUE,=<20 SQ CM   2. Osteomyelitis of second toe of left foot ([facility])  MT DEBRIDEMENT, SKIN, SUB-Q TISSUE,=<20 SQ CM   3. Infection of toe  MT DEBRIDEMENT, SKIN, SUB-Q TISSUE,=<20 SQ CM   4. Amputation of toe of left foot ([facility])  MT DEBRIDEMENT, SKIN, SUB-Q TISSUE,=<20 SQ CM   5. Pain in left foot  MT DEBRIDEMENT, SKIN, SUB-Q TISSUE,=<20 SQ CM   6. Peripheral vascular disorder ([facility])  MT DEBRIDEMENT, SKIN, SUB-Q TISSUE,=<20 SQ CM   7. Post-operative state  MT DEBRIDEMENT, SKIN, SUB-Q TISSUE,=<20 SQ CM           Plan:   Plan for wound -   Treatment:                Dressed with: Silver cell and a DSD. Home care: Patient to change this dressing daily. Abx :Yes; Patient to continue with taking his doxycycline. Cultures :were notobtained. Return in about 1 week (around 11/12/2021) for Wound Care.    11/5/2021        Kristopher Donovan DPM

## 2021-11-12 ENCOUNTER — OFFICE VISIT (OUTPATIENT)
Dept: PODIATRY | Age: 41
End: 2021-11-12
Payer: MEDICARE

## 2021-11-12 VITALS — WEIGHT: 215 LBS | BODY MASS INDEX: 28.49 KG/M2 | HEIGHT: 73 IN

## 2021-11-12 DIAGNOSIS — S98.132A AMPUTATION OF TOE OF LEFT FOOT (HCC): ICD-10-CM

## 2021-11-12 DIAGNOSIS — I96 GANGRENE OF TOE OF LEFT FOOT (HCC): ICD-10-CM

## 2021-11-12 DIAGNOSIS — T81.31XD WOUND DEHISCENCE, SURGICAL, SUBSEQUENT ENCOUNTER: ICD-10-CM

## 2021-11-12 DIAGNOSIS — M79.672 PAIN IN LEFT FOOT: ICD-10-CM

## 2021-11-12 DIAGNOSIS — L97.522 ULCER OF LEFT FOOT WITH FAT LAYER EXPOSED (HCC): Primary | ICD-10-CM

## 2021-11-12 DIAGNOSIS — L08.9 INFECTION OF TOE: ICD-10-CM

## 2021-11-12 DIAGNOSIS — M86.9 OSTEOMYELITIS OF SECOND TOE OF LEFT FOOT (HCC): ICD-10-CM

## 2021-11-12 PROCEDURE — 99999 PR OFFICE/OUTPT VISIT,PROCEDURE ONLY: CPT | Performed by: PODIATRIST

## 2021-11-12 PROCEDURE — 11042 DBRDMT SUBQ TIS 1ST 20SQCM/<: CPT | Performed by: PODIATRIST

## 2021-11-12 ASSESSMENT — ENCOUNTER SYMPTOMS
BACK PAIN: 0
NAUSEA: 0
COLOR CHANGE: 0
SHORTNESS OF BREATH: 0
DIARRHEA: 0

## 2021-11-12 NOTE — PROGRESS NOTES
Follow-Up Wound Progress Note   Chan Espinoza  AGE: 35 y.o. GENDER: male  : 1980  TODAY'S DATE:  2021  Subjective:    Chan Espinoza is a 35 y.o. male who presents today for wound check. Wound Location : Left foot. The patients pain is 4 out of 10. Patient states that they have been changing the dressing to the left lower extremity(s) with Silver cell and a DSD daily as instructed since last visit. Modifying factors: Post-op wound dehiscence. Review of Systems   Constitutional: Negative for activity change, appetite change, chills, diaphoresis, fatigue and fever. Respiratory: Negative for shortness of breath. Cardiovascular: Negative for leg swelling. Gastrointestinal: Negative for diarrhea and nausea. Endocrine: Negative for cold intolerance, heat intolerance and polyuria. Musculoskeletal: Positive for gait problem. Negative for arthralgias, back pain, joint swelling and myalgias. Skin: Positive for wound. Negative for color change, pallor and rash. Allergic/Immunologic: Negative for environmental allergies and food allergies. Neurological: Negative for dizziness, weakness, light-headedness and numbness. Hematological: Does not bruise/bleed easily. Psychiatric/Behavioral: Negative for behavioral problems, confusion and self-injury. The patient is not nervous/anxious. Objective:   Exam:  There is a full thickness wound to the dorsal aspect of the left foot secondary to dehiscence from amputation of the second toe. There is edema noted to the left foot. There is mild erythema surrounding the wound, but no calor is noted. The wound base is granular with overlying wound slough. The wound is moist, but no active drainage or malodor is noted. Debridement of the wound with a curette produces adequate bleeding. There is no tunneling or undermining noted to the wound. The wound measures 1.9 cm x 0.9 cm x 0.3 cm. Procedure:   The wound(s) to the left foot was debrided with removal of fibrotic, necrotic, and hyperkeratotic tissue, including a layer of surrounding healthy tissue down through and including subcutaneous tissue,using curette. Excisional Debridement  Percent of Wound Debrided: 100%    Wound: has improved    Bleeding: Minimal    Hemostasis:   by pressure    Response to treatment:  With complaints of pain. Assessment:      Diagnosis Orders   1. Ulcer of left foot with fat layer exposed ([address]  SC DEBRIDEMENT, SKIN, SUB-Q TISSUE,=<20 SQ CM   2. Wound dehiscence, surgical, subsequent encounter  SC DEBRIDEMENT, SKIN, SUB-Q TISSUE,=<20 SQ CM   3. Gangrene of toe of left foot (HCC)  SC DEBRIDEMENT, SKIN, SUB-Q TISSUE,=<20 SQ CM   4. Osteomyelitis of second toe of left foot (HCC)  SC DEBRIDEMENT, SKIN, SUB-Q TISSUE,=<20 SQ CM   5. Infection of toe  SC DEBRIDEMENT, SKIN, SUB-Q TISSUE,=<20 SQ CM   6. Amputation of toe of left foot (HCC)  SC DEBRIDEMENT, SKIN, SUB-Q TISSUE,=<20 SQ CM   7. Pain in left foot  SC DEBRIDEMENT, SKIN, SUB-Q TISSUE,=<20 SQ CM           Plan:   Plan for wound -   Treatment:                Dressed with: Silver cell and a DSD. Home care: Patient to change this dressing daily. Abx :No Cultures :were notobtained. Return in about 1 week (around 11/19/2021) for Wound Care.    11/12/2021        Angelic Dong DPM

## 2021-11-19 ENCOUNTER — OFFICE VISIT (OUTPATIENT)
Dept: PODIATRY | Age: 41
End: 2021-11-19
Payer: MEDICARE

## 2021-11-19 VITALS — HEIGHT: 73 IN | WEIGHT: 215 LBS | BODY MASS INDEX: 28.49 KG/M2

## 2021-11-19 DIAGNOSIS — M79.672 PAIN IN LEFT FOOT: ICD-10-CM

## 2021-11-19 DIAGNOSIS — L97.522 ULCER OF LEFT FOOT WITH FAT LAYER EXPOSED (HCC): Primary | ICD-10-CM

## 2021-11-19 DIAGNOSIS — T81.31XD WOUND DEHISCENCE, SURGICAL, SUBSEQUENT ENCOUNTER: ICD-10-CM

## 2021-11-19 PROCEDURE — 11042 DBRDMT SUBQ TIS 1ST 20SQCM/<: CPT | Performed by: PODIATRIST

## 2021-11-19 ASSESSMENT — ENCOUNTER SYMPTOMS
DIARRHEA: 0
COLOR CHANGE: 0
SHORTNESS OF BREATH: 0
BACK PAIN: 0
NAUSEA: 0

## 2021-11-19 NOTE — PROGRESS NOTES
Follow-Up Wound Progress Note   Patrick Elias  AGE: 35 y.o. GENDER: male  : 1980  TODAY'S DATE:  2021  Subjective:    Patrick Elias is a 35 y.o. male who presents today for wound check. Wound Location : Left foot. The patients pain is 4 out of 10. Patient states that they have been changing the dressing to the left lower extremity(s) with Silver cell and a DSD daily as instructed since last visit. Modifying factors: Post-op wound dehiscence. Review of Systems   Constitutional: Negative for activity change, appetite change, chills, diaphoresis, fatigue and fever. Respiratory: Negative for shortness of breath. Cardiovascular: Negative for leg swelling. Gastrointestinal: Negative for diarrhea and nausea. Endocrine: Negative for cold intolerance, heat intolerance and polyuria. Musculoskeletal: Positive for gait problem. Negative for arthralgias, back pain, joint swelling and myalgias. Skin: Positive for wound. Negative for color change, pallor and rash. Allergic/Immunologic: Negative for environmental allergies and food allergies. Neurological: Negative for dizziness, weakness, light-headedness and numbness. Hematological: Does not bruise/bleed easily. Psychiatric/Behavioral: Negative for behavioral problems, confusion and self-injury. The patient is not nervous/anxious. Objective:   Exam:  There is a full thickness wound to the dorsal aspect of the left foot secondary to dehiscence from amputation of the second toe. There is edema noted to the left foot. There is no erythema surrounding the wound today and no calor is noted. The wound base is granular with overlying wound slough. The wound is moist, but no active drainage or malodor is noted. Debridement of the wound with a curette produces adequate bleeding. There is no tunneling or undermining noted to the wound. The wound measures 1.6 cm x 0.7 cm x 0.3 cm. Procedure:   The wound(s) to the left foot was debrided with removal of fibrotic, necrotic, and hyperkeratotic tissue, including a layer of surrounding healthy tissue down through and including subcutaneous tissue,using curette. Excisional Debridement  Percent of Wound Debrided: 100%    Wound: has improved    Bleeding: Minimal    Hemostasis:   by pressure    Response to treatment:  With complaints of pain. Assessment:      Diagnosis Orders   1. Ulcer of left foot with fat layer exposed ([address]  NM DEBRIDEMENT, SKIN, SUB-Q TISSUE,=<20 SQ CM   2. Wound dehiscence, surgical, subsequent encounter  NM DEBRIDEMENT, SKIN, SUB-Q TISSUE,=<20 SQ CM   3. Pain in left foot  NM DEBRIDEMENT, SKIN, SUB-Q TISSUE,=<20 SQ CM           Plan:   Plan for wound -   Treatment:                Dressed with: Ana and a DSD. Home care: Patient to change the dressing daily with Ana and a DSD. Patient given a prescription for Ana. Abx :No Cultures :were notobtained. Return in about 2 weeks (around 12/3/2021) for Wound Care.    11/19/2021        Erica Valdovinos DPM

## 2021-11-30 ENCOUNTER — OFFICE VISIT (OUTPATIENT)
Dept: PODIATRY | Age: 41
End: 2021-11-30
Payer: MEDICARE

## 2021-11-30 VITALS — HEIGHT: 73 IN | WEIGHT: 215 LBS | BODY MASS INDEX: 28.49 KG/M2

## 2021-11-30 DIAGNOSIS — T81.31XD WOUND DEHISCENCE, SURGICAL, SUBSEQUENT ENCOUNTER: ICD-10-CM

## 2021-11-30 DIAGNOSIS — M79.672 PAIN IN LEFT FOOT: ICD-10-CM

## 2021-11-30 DIAGNOSIS — L97.522 ULCER OF LEFT FOOT WITH FAT LAYER EXPOSED (HCC): Primary | ICD-10-CM

## 2021-11-30 PROCEDURE — 11042 DBRDMT SUBQ TIS 1ST 20SQCM/<: CPT | Performed by: PODIATRIST

## 2021-11-30 PROCEDURE — 99999 PR OFFICE/OUTPT VISIT,PROCEDURE ONLY: CPT | Performed by: PODIATRIST

## 2021-11-30 ASSESSMENT — ENCOUNTER SYMPTOMS
BACK PAIN: 0
NAUSEA: 0
SHORTNESS OF BREATH: 0
DIARRHEA: 0
COLOR CHANGE: 0

## 2021-11-30 NOTE — PROGRESS NOTES
Follow-Up Wound Progress Note   Obed Cooepr  AGE: 35 y.o. GENDER: male  : 1980  TODAY'S DATE:  2021  Subjective:    Obed oCoper is a 35 y.o. male who presents today for wound check. Wound Location : Left foot. The patients pain is 4 out of 10. Patient states that they have been changing the dressing to the left lower extremity(s) with Ana and a DSD daily as instructed since last visit. Modifying factors: Post-op wound dehiscence. Review of Systems   Constitutional: Negative for activity change, appetite change, chills, diaphoresis, fatigue and fever. Respiratory: Negative for shortness of breath. Cardiovascular: Negative for leg swelling. Gastrointestinal: Negative for diarrhea and nausea. Endocrine: Negative for cold intolerance, heat intolerance and polyuria. Musculoskeletal: Negative for arthralgias, back pain, gait problem, joint swelling and myalgias. Skin: Positive for wound. Negative for color change, pallor and rash. Allergic/Immunologic: Negative for environmental allergies and food allergies. Neurological: Negative for dizziness, weakness, light-headedness and numbness. Hematological: Does not bruise/bleed easily. Psychiatric/Behavioral: Negative for behavioral problems, confusion and self-injury. The patient is not nervous/anxious. Objective:   Exam:  There is a full thickness wound to the dorsal aspect of the left foot secondary to dehiscence from amputation of the second toe. There is edema noted to the left foot. There is no erythema surrounding the wound today and no calor is noted. The wound base is granular with overlying wound slough. The wound is moist, but no active drainage or malodor is noted. Debridement of the wound with a curette produces adequate bleeding. There is no tunneling or undermining noted to the wound. The wound measures 1.4 cm x 0.6 cm x 0.3 cm. Procedure:   The wound(s) to the left foot was debrided with removal of fibrotic, necrotic, and hyperkeratotic tissue, including a layer of surrounding healthy tissue down through and including subcutaneous tissue,using curette. Excisional Debridement  Percent of Wound Debrided: 100%    Wound: has improved    Bleeding: Minimal    Hemostasis:   by pressure    Response to treatment:  With complaints of pain. Assessment:      Diagnosis Orders   1. Ulcer of left foot with fat layer exposed ([address]  RI DEBRIDEMENT, SKIN, SUB-Q TISSUE,=<20 SQ CM   2. Wound dehiscence, surgical, subsequent encounter  RI DEBRIDEMENT, SKIN, SUB-Q TISSUE,=<20 SQ CM   3. Pain in left foot  RI DEBRIDEMENT, SKIN, SUB-Q TISSUE,=<20 SQ CM           Plan:   Plan for wound -   Treatment:                Dressed with: Aan and a DSD. Home care: Patient to change this dressing daily. Abx :No Cultures :were notobtained. Return in about 1 week (around 12/7/2021) for Wound Care.    11/30/2021        Xenia Avila DPM

## 2021-12-07 ENCOUNTER — OFFICE VISIT (OUTPATIENT)
Dept: PODIATRY | Age: 41
End: 2021-12-07
Payer: MEDICARE

## 2021-12-07 VITALS — WEIGHT: 215 LBS | HEIGHT: 73 IN | BODY MASS INDEX: 28.49 KG/M2

## 2021-12-07 DIAGNOSIS — M79.672 PAIN IN LEFT FOOT: ICD-10-CM

## 2021-12-07 DIAGNOSIS — T81.31XD WOUND DEHISCENCE, SURGICAL, SUBSEQUENT ENCOUNTER: ICD-10-CM

## 2021-12-07 DIAGNOSIS — L97.522 ULCER OF LEFT FOOT WITH FAT LAYER EXPOSED (HCC): Primary | ICD-10-CM

## 2021-12-07 PROCEDURE — 11042 DBRDMT SUBQ TIS 1ST 20SQCM/<: CPT | Performed by: PODIATRIST

## 2021-12-08 ASSESSMENT — ENCOUNTER SYMPTOMS
DIARRHEA: 0
COLOR CHANGE: 0
SHORTNESS OF BREATH: 0
NAUSEA: 0
BACK PAIN: 0

## 2021-12-08 NOTE — PROGRESS NOTES
Follow-Up Wound Progress Note   Jeffrey Souza  AGE: 35 y.o. GENDER: male  : 1980  TODAY'S DATE:  2021  Subjective:    Jeffrey Souza is a 35 y.o. male who presents today for wound check. Wound Location : Left foot. The patients pain is 4 out of 10. Patient states that they have been changing the dressing to the left lower extremity(s) with Ana and a DSD daily as instructed since last visit. Modifying factors: Post-op wound dehiscence. Review of Systems   Constitutional: Negative for activity change, appetite change, chills, diaphoresis, fatigue and fever. Respiratory: Negative for shortness of breath. Cardiovascular: Negative for leg swelling. Gastrointestinal: Negative for diarrhea and nausea. Endocrine: Negative for cold intolerance, heat intolerance and polyuria. Musculoskeletal: Negative for arthralgias, back pain, gait problem, joint swelling and myalgias. Skin: Positive for wound. Negative for color change, pallor and rash. Allergic/Immunologic: Negative for environmental allergies and food allergies. Neurological: Negative for dizziness, weakness, light-headedness and numbness. Hematological: Does not bruise/bleed easily. Psychiatric/Behavioral: Negative for behavioral problems, confusion and self-injury. The patient is not nervous/anxious. Objective:   Exam:  There remains a full thickness wound to the dorsal aspect of the left foot secondary to dehiscence from amputation of the second toe. There is mild edema noted to the left foot. There is no erythema surrounding the wound today and no calor is noted. The wound base is granular with overlying wound slough. The wound is moist, but no active drainage or malodor is noted. Debridement of the wound with a curette produces adequate bleeding. There is no tunneling or undermining noted to the wound. The wound measures 0.9 cm x 0.3 cm x 0.2 cm. Procedure:   The wound(s) to the left foot was debrided with removal of fibrotic, necrotic, and hyperkeratotic tissue, including a layer of surrounding healthy tissue down through and including subcutaneous tissue,using curette. Excisional Debridement  Percent of Wound Debrided: 100%    Wound: has improved    Bleeding: Minimal    Hemostasis:   by pressure    Response to treatment:  With complaints of pain. Assessment:      Diagnosis Orders   1. Ulcer of left foot with fat layer exposed ([address]  NH DEBRIDEMENT, SKIN, SUB-Q TISSUE,=<20 SQ CM   2. Wound dehiscence, surgical, subsequent encounter  NH DEBRIDEMENT, SKIN, SUB-Q TISSUE,=<20 SQ CM   3. Pain in left foot  NH DEBRIDEMENT, SKIN, SUB-Q TISSUE,=<20 SQ CM           Plan:   Plan for wound -   Treatment:                Dressed with: Ana and a DSD. Home care: Patient to change this dressing daily. Abx :No Cultures :were notobtained. Return in about 1 week (around 12/14/2021) for Wound Care.    12/7/2021        Kimberlee Guillen DPM

## 2021-12-14 ENCOUNTER — OFFICE VISIT (OUTPATIENT)
Dept: PODIATRY | Age: 41
End: 2021-12-14
Payer: MEDICARE

## 2021-12-14 VITALS — WEIGHT: 220 LBS | HEIGHT: 73 IN | BODY MASS INDEX: 29.16 KG/M2

## 2021-12-14 DIAGNOSIS — M79.672 PAIN IN LEFT FOOT: ICD-10-CM

## 2021-12-14 DIAGNOSIS — L97.522 ULCER OF LEFT FOOT WITH FAT LAYER EXPOSED (HCC): Primary | ICD-10-CM

## 2021-12-14 PROCEDURE — 11042 DBRDMT SUBQ TIS 1ST 20SQCM/<: CPT | Performed by: PODIATRIST

## 2021-12-14 RX ORDER — HYDROXYZINE HYDROCHLORIDE 25 MG/1
TABLET, FILM COATED ORAL
COMMUNITY
Start: 2021-12-09 | End: 2022-05-26 | Stop reason: ALTCHOICE

## 2021-12-14 RX ORDER — APIXABAN 5 MG/1
TABLET, FILM COATED ORAL
COMMUNITY
Start: 2021-12-09

## 2021-12-14 ASSESSMENT — ENCOUNTER SYMPTOMS
NAUSEA: 0
SHORTNESS OF BREATH: 0
BACK PAIN: 0
DIARRHEA: 0
COLOR CHANGE: 0

## 2021-12-14 NOTE — PROGRESS NOTES
Follow-Up Wound Progress Note   Patrick Elias  AGE: 35 y.o. GENDER: male  : 1980  TODAY'S DATE:  2021  Subjective:    Patrick Elias is a 35 y.o. male who presents today for wound check. Wound Location : Left foot. The patients pain is 3 out of 10. Patient states that they have been changing the dressing to the left lower extremity(s) with Ana and a DSD daily as instructed since last visit. Modifying factors: Post-op wound dehiscence. Review of Systems   Constitutional: Negative for activity change, appetite change, chills, diaphoresis, fatigue and fever. Respiratory: Negative for shortness of breath. Cardiovascular: Negative for leg swelling. Gastrointestinal: Negative for diarrhea and nausea. Endocrine: Negative for cold intolerance, heat intolerance and polyuria. Musculoskeletal: Negative for arthralgias, back pain, gait problem, joint swelling and myalgias. Skin: Positive for wound. Negative for color change, pallor and rash. Allergic/Immunologic: Negative for environmental allergies and food allergies. Neurological: Negative for dizziness, weakness, light-headedness and numbness. Hematological: Does not bruise/bleed easily. Psychiatric/Behavioral: Negative for behavioral problems, confusion and self-injury. The patient is not nervous/anxious. Objective:   Exam:  There remains a full thickness wound to the dorsal aspect of the left foot secondary to dehiscence from amputation of the second toe. There is mild edema noted to the left foot. There is no erythema surrounding the wound today and no calor is noted. The wound base is granular with overlying wound slough. The wound is moist, but no active drainage or malodor is noted. Debridement of the wound with a curette produces adequate bleeding. There is no tunneling or undermining noted to the wound. The wound measures 0.4 cm x 0.2 cm x 0.2 cm. Procedure:   The wound(s) to the left foot was debrided with removal of fibrotic, necrotic, and hyperkeratotic tissue, including a layer of surrounding healthy tissue down through and including subcutaneous tissue,using curette. Excisional Debridement  Percent of Wound Debrided: 100%    Wound: has improved    Bleeding: Minimal    Hemostasis:   by pressure    Response to treatment:  With complaints of pain. Assessment:      Diagnosis Orders   1. Ulcer of left foot with fat layer exposed ([address]  MD DEBRIDEMENT, SKIN, SUB-Q TISSUE,=<20 SQ CM   2. Pain in left foot  MD DEBRIDEMENT, SKIN, SUB-Q TISSUE,=<20 SQ CM           Plan:   Plan for wound -   Treatment:                Dressed with: Antibiotic ointment and a band aid. Home care: Patient to discontinue use of the Ana and is to now change the dressing daily with antibiotic ointment and a band aid. Abx :No Cultures :were notobtained. Return in about 3 weeks (around 1/4/2022) for Wound Care.    12/14/2021        Osmany Marvin DPM

## 2022-01-04 ENCOUNTER — OFFICE VISIT (OUTPATIENT)
Dept: PODIATRY | Age: 42
End: 2022-01-04
Payer: MEDICARE

## 2022-01-04 VITALS — BODY MASS INDEX: 29.16 KG/M2 | HEIGHT: 73 IN | WEIGHT: 220 LBS

## 2022-01-04 DIAGNOSIS — L97.522 ULCER OF LEFT FOOT WITH FAT LAYER EXPOSED (HCC): Primary | ICD-10-CM

## 2022-01-04 DIAGNOSIS — T81.31XD WOUND DEHISCENCE, SURGICAL, SUBSEQUENT ENCOUNTER: ICD-10-CM

## 2022-01-04 DIAGNOSIS — M79.672 PAIN IN LEFT FOOT: ICD-10-CM

## 2022-01-04 PROCEDURE — G8417 CALC BMI ABV UP PARAM F/U: HCPCS | Performed by: PODIATRIST

## 2022-01-04 PROCEDURE — G8427 DOCREV CUR MEDS BY ELIG CLIN: HCPCS | Performed by: PODIATRIST

## 2022-01-04 PROCEDURE — 4004F PT TOBACCO SCREEN RCVD TLK: CPT | Performed by: PODIATRIST

## 2022-01-04 PROCEDURE — G8484 FLU IMMUNIZE NO ADMIN: HCPCS | Performed by: PODIATRIST

## 2022-01-04 PROCEDURE — 99213 OFFICE O/P EST LOW 20 MIN: CPT | Performed by: PODIATRIST

## 2022-01-04 RX ORDER — ATORVASTATIN CALCIUM 40 MG/1
TABLET, FILM COATED ORAL
COMMUNITY
Start: 2021-12-22

## 2022-01-04 ASSESSMENT — ENCOUNTER SYMPTOMS
BACK PAIN: 0
NAUSEA: 0
DIARRHEA: 0
COLOR CHANGE: 0
SHORTNESS OF BREATH: 0

## 2022-01-04 NOTE — PROGRESS NOTES
[facility] Podiatry  Return Patient Progress Note    Subjective: Diamond Scot 35 y.o. male that presents for follow up evaluation of wound to the left foot. Chief Complaint   Patient presents with    Wound Check     left foot    Patient's treatment thus far has included daily dressing changes with antibiotic ointment and a band aid. Pain is rated 0 out of 10 and is described as none. Patient has been following my prescribed course of therapy as instructed. Review of Systems   Constitutional: Negative for activity change, appetite change, chills, diaphoresis, fatigue and fever. Respiratory: Negative for shortness of breath. Cardiovascular: Negative for leg swelling. Gastrointestinal: Negative for diarrhea and nausea. Endocrine: Negative for cold intolerance, heat intolerance and polyuria. Musculoskeletal: Negative for arthralgias, back pain, gait problem, joint swelling and myalgias. Skin: Positive for wound. Negative for color change, pallor and rash. Allergic/Immunologic: Negative for environmental allergies and food allergies. Neurological: Negative for dizziness, weakness, light-headedness and numbness. Hematological: Does not bruise/bleed easily. Psychiatric/Behavioral: Negative for behavioral problems, confusion and self-injury. The patient is not nervous/anxious. Objective: Clinical evaluation of the patient reveals eschar to the dorsal aspect of the left foot secondary to dehiscence from amputation of the second toe. This eschar is lytic. Removal of this eschar does not reveal any remaining open wound. There is no erythema, calor, drainage, or malodor noted to the left foot. There remains mild edema to the left foot. Assessment:    Diagnosis Orders   1. Ulcer of left foot with fat layer exposed ([address]     2. Pain in left foot     3. Wound dehiscence, surgical, subsequent encounter           Plan: 1. Clinical evaluation of the patient.  2. Patient informed that his wound has healed and he can discontinue dressing changes at this time. 3. Return if symptoms worsen or fail to improve.    1/4/2022      Diomedes Casey DPM

## 2022-01-16 ENCOUNTER — HOSPITAL ENCOUNTER (EMERGENCY)
Age: 42
Discharge: HOME | End: 2022-01-16
Attending: EMERGENCY MEDICINE
Payer: MEDICARE

## 2022-01-16 VITALS
DIASTOLIC BLOOD PRESSURE: 85 MMHG | HEIGHT: 74 IN | TEMPERATURE: 98.5 F | WEIGHT: 221 LBS | SYSTOLIC BLOOD PRESSURE: 139 MMHG | HEART RATE: 72 BPM | OXYGEN SATURATION: 98 % | BODY MASS INDEX: 28.36 KG/M2 | RESPIRATION RATE: 14 BRPM

## 2022-01-16 DIAGNOSIS — E89.0 POSTOPERATIVE HYPOTHYROIDISM: ICD-10-CM

## 2022-01-16 DIAGNOSIS — K02.9 PAIN DUE TO DENTAL CARIES: Primary | ICD-10-CM

## 2022-01-16 PROCEDURE — 99282 EMERGENCY DEPT VISIT SF MDM: CPT

## 2022-01-16 RX ORDER — AMOXICILLIN 500 MG/1
500 CAPSULE ORAL 2 TIMES DAILY
Qty: 14 CAPSULE | Refills: 0 | Status: SHIPPED | OUTPATIENT
Start: 2022-01-16 | End: 2022-01-23

## 2022-01-16 ASSESSMENT — PAIN SCALES - GENERAL: PAINLEVEL_OUTOF10: 7

## 2022-01-16 NOTE — ED PROVIDER NOTES
[facility]  ED  Emergency Department Encounter  EmergencyMedicine Resident     Pt Name:Dustin Janece Osler  MRN: [id number]  [place] 1980  Date of evaluation: 1/16/22  PCP:  Rich Gonzales    This patient was evaluated in the Emergency Department for symptoms described in the history of present illness. The patient was evaluated in the context of the global COVID-19 pandemic, which necessitated consideration that the patient might be at risk for infection with the SARS-CoV-2 virus that causes COVID-19. Institutional protocols and algorithms that pertain to the evaluation of patients at risk for COVID-19 are in a state of rapid change based on information released by regulatory bodies including the CDC and federal and state organizations. These policies and algorithms were followed during the patient's care in the ED. CHIEF COMPLAINT       Chief Complaint   Patient presents with    Dental Pain       HISTORY OF PRESENT ILLNESS  (Location/Symptom, Timing/Onset, Context/Setting, Quality, Duration, Modifying Factors, Severity.)      Sagar Busch is a 35 y.o. male who presents with pain and swelling at the right jaw since 2 days, 10/10, aggravated by chewing, associated with dental pain. To ibuprofen today with amelioration of pain and swelling. No recent dental work. Has poor dentition. History of thyroid cancer status post thyroidectomy 2016, on levothyroxine, DVT in 2016 on Eliquis daily, skin cancer from resected more 2016, ischemic PAD with hypercholesterol status post amputation of left second toe on atorvastatin. Smoker, used to smoke 1 pack daily prior to thyroid cancer, now smokes 5 to 6 cigarettes daily. Family history of CAD in uncles and grandparents. Admits to having tinnitus, cough, numbness of feet, cyanosis of left foot. Denies fever, rhinorrhea, sore throat, vomiting, diarrhea, shortness of breath, claudication, calf pain.     PAST MEDICAL / SURGICAL / SOCIAL / FAMILY HISTORY      has a past medical history of Cancer ([facility] Gangrene ([facility] Headache, Hx of blood clots, Hypothyroidism, Osteomyelitis ([facility] Papillary thyroid carcinoma ([facility] and PVD (peripheral vascular disease) ([facility] has a past surgical history that includes Total Thyroidectomy (Bilateral, 2017); IR US THYROID BIOPSY; Colonoscopy (2020); and Toe amputation (Left, 10/14/2021). Social History     Socioeconomic History    Marital status: Single     Spouse name: Not on file    Number of children: Not on file    Years of education: Not on file    Highest education level: Not on file   Occupational History    Not on file   Tobacco Use    Smoking status: Current Every Day Smoker     Packs/day: 0.50    Smokeless tobacco: Never Used    Tobacco comment: trying to cut back   Substance and Sexual Activity    Alcohol use: Yes     Comment: rare    Drug use: Yes     Types: Marijuana Deirdre Jomar)     Comment: daily    Sexual activity: Not on file   Other Topics Concern    Not on file   Social History Narrative    Not on file     Social Determinants of Health     Financial Resource Strain:     Difficulty of Paying Living Expenses: Not on file   Food Insecurity:     Worried About Running Out of Food in the Last Year: Not on file    Chong of Food in the Last Year: Not on file   Transportation Needs:     Lack of Transportation (Medical): Not on file    Lack of Transportation (Non-Medical):  Not on file   Physical Activity:     Days of Exercise per Week: Not on file    Minutes of Exercise per Session: Not on file   Stress:     Feeling of Stress : Not on file   Social Connections:     Frequency of Communication with Friends and Family: Not on file    Frequency of Social Gatherings with Friends and Family: Not on file    Attends Latter day Services: Not on file    Active Member of Clubs or Organizations: Not on file    Attends Club or Organization Meetings: Not on file    Marital Status: Not on file Intimate Partner Violence:     Fear of Current or Ex-Partner: Not on file    Emotionally Abused: Not on file    Physically Abused: Not on file    Sexually Abused: Not on file   Housing Stability:     Unable to Pay for Housing in the Last Year: Not on file    Number of [place] in the Last Year: Not on file    Unstable Housing in the Last Year: Not on file       Family History   Problem Relation Age of Onset    Cancer Mother     High Blood Pressure Father     Asthma Brother        Allergies:  Patient has no known allergies. Home Medications:  Prior to Admission medications    Medication Sig Start Date End Date Taking? Authorizing Provider   amoxicillin (AMOXIL) 500 MG capsule Take 1 capsule by mouth 2 times daily for 7 days 1/16/22 1/23/22 Yes Blanka Pollard MD   atorvastatin (LIPITOR) 40 MG tablet  12/22/21   Historical Provider, MD   hydrOXYzine (ATARAX) 25 MG tablet  12/9/21   Historical Provider, MD   ELIQUIS 5 MG TABS tablet  12/9/21   Historical Provider, MD   [facility] FEMI WOUND DRESSING MATRIX Apply topically Please dispense 1 box. Apply to left foot daily 11/19/21   Narvis Orchard, DPM   Gauze Pads & Dressings 2\"X2\" PADS Apply to left foot once daily. 8/26/21   Khai Pale, DPM   Gauze Bandages (SOF-DEV CONFORMING BANDAGE) MISC Apply to left foot once daily. 8/26/21   Khai Pale, DPM   Povidone-Iodine 7.5 % SWAB Please apply to left 2nd digit daily with dressing change. 8/26/21   Khai Pale, DPM   levothyroxine (SYNTHROID) 175 MCG tablet Once a day 12/22/20   Day Lai MD   aspirin 81 MG tablet Take 81 mg by mouth daily    Historical Provider, MD   Cholecalciferol (VITAMIN D3) 2000 units TABS Take by mouth    Historical Provider, MD       REVIEW OF SYSTEMS    (2-9 systems for level 4, 10 or more for level 5)      Review of Systems   Constitutional: Negative for activity change, appetite change, chills, fatigue and fever.    HENT: Positive for dental problem (dental pain teeth 31), facial swelling and tinnitus. Negative for congestion, drooling, ear pain, hearing loss, mouth sores, postnasal drip, rhinorrhea, sneezing, sore throat and trouble swallowing. Eyes: Negative for photophobia, pain, discharge, redness and visual disturbance. Respiratory: Negative for cough, choking, shortness of breath and wheezing. Cardiovascular: Negative for chest pain, palpitations and leg swelling. Gastrointestinal: Negative for abdominal distention, blood in stool, diarrhea and vomiting. Endocrine: Negative for polyuria. Genitourinary: Negative for dysuria and hematuria. Musculoskeletal: Negative for gait problem, myalgias and neck stiffness. Allergic/Immunologic: Negative for food allergies. Neurological: Positive for numbness and headaches. Negative for dizziness, tremors, seizures, syncope, facial asymmetry, speech difficulty, weakness and light-headedness. Hematological: Negative for adenopathy. PHYSICAL EXAM   (up to 7 for level 4, 8 or more for level 5)      INITIAL VITALS:   /85   Pulse 72   Temp 98.5 °F (36.9 °C) (Oral)   Resp 14   Ht 6' 2\" (1.88 m)   Wt 221 lb (100.2 kg)   SpO2 98%   BMI 28.37 kg/m²     Physical Exam  Constitutional:       Appearance: Normal appearance. HENT:      Head: Normocephalic and atraumatic. Right Ear: Tympanic membrane, ear canal and external ear normal. There is no impacted cerumen. Left Ear: Tympanic membrane, ear canal and external ear normal. There is no impacted cerumen. Nose: Nose normal. No congestion or rhinorrhea. Mouth/Throat:      Mouth: Mucous membranes are moist.      Pharynx: No oropharyngeal exudate or posterior oropharyngeal erythema. Comments: Poor dentition  Eyes:      General:         Right eye: No discharge. Left eye: No discharge. Pupils: Pupils are equal, round, and reactive to light.    Cardiovascular:      Rate and Rhythm: Normal rate and regular rhythm. Pulses: Normal pulses. Heart sounds: Normal heart sounds. Pulmonary:      Effort: Pulmonary effort is normal.      Breath sounds: Rhonchi present. Abdominal:      General: Abdomen is flat. There is no distension. Palpations: There is no mass. Musculoskeletal:         General: Deformity present. Cervical back: Normal range of motion and neck supple. No rigidity or tenderness. Right lower leg: No edema. Left lower leg: No edema. Lymphadenopathy:      Cervical: No cervical adenopathy. Skin:     Capillary Refill: Capillary refill takes more than 3 seconds. Coloration: Skin is not jaundiced. Findings: No bruising or rash. Comments: Left foot cyannosis   Neurological:      General: No focal deficit present. Mental Status: He is alert. Cranial Nerves: No cranial nerve deficit. Sensory: No sensory deficit. DIFFERENTIAL  DIAGNOSIS     PLAN (LABS / IMAGING / EKG):  No orders of the defined types were placed in this encounter. MEDICATIONS ORDERED:  Orders Placed This Encounter   Medications    amoxicillin (AMOXIL) 500 MG capsule     Sig: Take 1 capsule by mouth 2 times daily for 7 days     Dispense:  14 capsule     Refill:  0       DDX: dental pain, root canal, salivary gland inflammation, lymphadenopathy    DIAGNOSTIC RESULTS / EMERGENCY DEPARTMENT COURSE / MDM   LAB RESULTS:  No results found for this visit on 01/16/22. RADIOLOGY:  No results found. EMERGENCY DEPARTMENT COURSE:     Discharge to follow-up with dentist, PCP, endocrinologist and vascular. PROCEDURES:  none    CONSULTS:  None        FINAL IMPRESSION      1.  Pain due to dental caries          DISPOSITION / PLAN     DISPOSITION Decision To Discharge 01/16/2022 06:32:49 PM      PATIENT REFERRED TO:  Christo Kendrick in 1 week      Torey Nicholson [facility]  [address] [facility]  [phone]  Schedule an appointment as soon as possible for a visit in 1 day        DISCHARGE MEDICATIONS:  Discharge Medication List as of 1/16/2022  6:40 PM      START taking these medications    Details   amoxicillin (AMOXIL) 500 MG capsule Take 1 capsule by mouth 2 times daily for 7 days, Disp-14 capsule, R-0Print             Xavier Denny MD   Emergency Medicine Resident    (Please note that portions of thisnote were completed with a voice recognition program.  Efforts were made to edit the dictations but occasionally words are mis-transcribed.) ]     Xavier Denny MD  Resident  01/17/22 [id number]

## 2022-01-16 NOTE — ED PROVIDER NOTES
[address] [facility]     Emergency Department     Faculty Attestation    I performed a history and physical examination of the patient and discussed management with the resident. I reviewed the resident´s note and agree with the documented findings and plan of care. Any areas of disagreement are noted on the chart. I was personally present for the key portions of any procedures. I have documented in the chart those procedures where I was not present during the key portions. I have reviewed the emergency nurses triage note. I agree with the chief complaint, past medical history, past surgical history, allergies, medications, social and family history as documented unless otherwise noted below. For Physician Assistant/ Nurse Practitioner cases/documentation I have personally evaluated this patient and have completed at least one if not all key elements of the E/M (history, physical exam, and MDM). Additional findings are as noted. Good airway, no drooling, no sublingual swelling, pain around tooth number 31 with mild overlying facial swelling, no swelling over the neck, no cervical lymphadenopathy. Heart exam normal. No meningeal signs. Neuro intact.     Ava Huber MD [address] [facility]  Attending Physician         Ina Escoto MD  01/16/22 [id number]

## 2022-01-16 NOTE — ED NOTES
Pt. Arrives to the ED w/ list of complaints states that the main concern is the dental pain on the R side of his mouth. States that his sister told him his eye is black, eyes appear WDL, Pt. States that he has some pain in his legs and feet. A&Ox4, RR even and non-labored, NAD noted, med student at bedside to assess pt.       Marichuy Parker RN  01/16/22 [id number]

## 2022-01-17 ASSESSMENT — ENCOUNTER SYMPTOMS
EYE REDNESS: 0
CHOKING: 0
COUGH: 0
RHINORRHEA: 0
WHEEZING: 0
TROUBLE SWALLOWING: 0
BLOOD IN STOOL: 0
ABDOMINAL DISTENTION: 0
SHORTNESS OF BREATH: 0
SORE THROAT: 0
VOMITING: 0
FACIAL SWELLING: 1
DIARRHEA: 0
PHOTOPHOBIA: 0
EYE PAIN: 0
EYE DISCHARGE: 0

## 2022-01-18 RX ORDER — LEVOTHYROXINE SODIUM 175 UG/1
TABLET ORAL
Qty: 30 TABLET | Refills: 11 | OUTPATIENT
Start: 2022-01-18

## 2022-01-19 DIAGNOSIS — E89.0 POSTOPERATIVE HYPOTHYROIDISM: ICD-10-CM

## 2022-01-19 RX ORDER — LEVOTHYROXINE SODIUM 175 UG/1
TABLET ORAL
Qty: 30 TABLET | Refills: 0 | Status: SHIPPED | OUTPATIENT
Start: 2022-01-19 | End: 2022-02-18 | Stop reason: SDUPTHER

## 2022-01-19 NOTE — TELEPHONE ENCOUNTER
patient requesting medication refill.  Please approve or deny this request.    Rx requested:  Requested Prescriptions     Pending Prescriptions Disp Refills    levothyroxine (SYNTHROID) 175 MCG tablet 30 tablet 0     Sig: Once a day         Last Office Visit:   12/22/2020      Next Visit Date:  Future Appointments   Date Time Provider Maryuri Ferguson   1/27/2022  3:15 PM Wes French MD [address] [place] [address]

## 2022-02-18 ENCOUNTER — OFFICE VISIT (OUTPATIENT)
Dept: ENDOCRINOLOGY | Age: 42
End: 2022-02-18
Payer: MEDICARE

## 2022-02-18 VITALS
HEART RATE: 67 BPM | DIASTOLIC BLOOD PRESSURE: 78 MMHG | BODY MASS INDEX: 28.23 KG/M2 | WEIGHT: 220 LBS | SYSTOLIC BLOOD PRESSURE: 126 MMHG | HEIGHT: 74 IN

## 2022-02-18 DIAGNOSIS — C73 PAPILLARY THYROID CARCINOMA (HCC): ICD-10-CM

## 2022-02-18 DIAGNOSIS — E89.0 POSTOPERATIVE HYPOTHYROIDISM: Primary | ICD-10-CM

## 2022-02-18 DIAGNOSIS — E89.0 POSTOPERATIVE HYPOTHYROIDISM: ICD-10-CM

## 2022-02-18 LAB
T4 FREE: 1.59 NG/DL (ref 0.84–1.68)
TSH SERPL DL<=0.05 MIU/L-ACNC: 1.73 UIU/ML (ref 0.44–3.86)

## 2022-02-18 PROCEDURE — G8484 FLU IMMUNIZE NO ADMIN: HCPCS | Performed by: INTERNAL MEDICINE

## 2022-02-18 PROCEDURE — G8427 DOCREV CUR MEDS BY ELIG CLIN: HCPCS | Performed by: INTERNAL MEDICINE

## 2022-02-18 PROCEDURE — G8417 CALC BMI ABV UP PARAM F/U: HCPCS | Performed by: INTERNAL MEDICINE

## 2022-02-18 PROCEDURE — 4004F PT TOBACCO SCREEN RCVD TLK: CPT | Performed by: INTERNAL MEDICINE

## 2022-02-18 PROCEDURE — 99213 OFFICE O/P EST LOW 20 MIN: CPT | Performed by: INTERNAL MEDICINE

## 2022-02-18 RX ORDER — LEVOTHYROXINE SODIUM 175 UG/1
TABLET ORAL
Qty: 30 TABLET | Refills: 5 | Status: SHIPPED | OUTPATIENT
Start: 2022-02-18 | End: 2022-04-05 | Stop reason: SDUPTHER

## 2022-02-18 ASSESSMENT — ENCOUNTER SYMPTOMS: COLOR CHANGE: 1

## 2022-02-18 NOTE — PROGRESS NOTES
2/18/2022    Assessment:       Diagnosis Orders   1. Postoperative hypothyroidism     2. Papillary thyroid carcinoma (HCC)           PLAN:     Orders Placed This Encounter   Procedures    TSH     Standing Status:   Future     Number of Occurrences:   1     Standing Expiration Date:   2/18/2023    T4, Free     Standing Status:   Future     Number of Occurrences:   1     Standing Expiration Date:   2/18/2023    Anti-Thyroglobulin Antibody     Standing Status:   Future     Number of Occurrences:   1     Standing Expiration Date:   2/18/2023    Thyroglobulin     Standing Status:   Future     Number of Occurrences:   1     Standing Expiration Date:   2/18/2023     Continue levothyroxine 175 mcg daily repeat labs follow-up in 3 months  Subjective:     Chief Complaint   Patient presents with    Hypothyroidism     Vitals:    02/18/22 1454   BP: 126/78   Site: Right Upper Arm   Position: Sitting   Cuff Size: Large Adult   Pulse: 67   Weight: 220 lb (99.8 kg)   Height: 6' 2\" (1.88 m)     Wt Readings from Last 3 Encounters:   02/18/22 220 lb (99.8 kg)   01/16/22 221 lb (100.2 kg)   01/04/22 220 lb (99.8 kg)     BP Readings from Last 3 Encounters:   02/18/22 126/78   01/16/22 139/85   10/14/21 (!) 140/79     Follow-up on hypothyroidism status post thyroidectomy for papillary thyroid carcinoma patient only had labs done recently with a TSH of 5.74 no thyroglobulin level to review labs were done 12/9/2021  Patient also seeing neurosurgery surgeon for gangrene due to peripheral vascular disease more than likely due to many years of smoking had amputation done of his left second toe    Other  This is a recurrent (Hypothyroidism) problem. The current episode started more than 1 year ago. The problem has been waxing and waning. Associated symptoms include fatigue. Exacerbated by: Thyroidectomy. Treatments tried: Levothyroxine. The treatment provided moderate relief.      Past Medical History:   Diagnosis Date    Cancer [facility]) Thyroid    Gangrene ([facility]     left 2nd toe    Headache     Hx of blood clots 08/2021    DVT, [facility]    Hypothyroidism     Osteomyelitis ([facility]     [facility]    radiation and surgery    PVD (peripheral vascular disease) ([facility]      Past Surgical History:   Procedure Laterality Date    COLONOSCOPY  2020    IR US THYROID BIOPSY      TOE AMPUTATION Left 10/14/2021    2ND TOE AMPUTATION performed by Gloria Crook DPM at [address] Bilateral 2017    31 lymph nodes removed, 3 total surgeries     Social History     Socioeconomic History    Marital status: Single     Spouse name: Not on file    Number of children: Not on file    Years of education: Not on file    Highest education level: Not on file   Occupational History    Not on file   Tobacco Use    Smoking status: Current Every Day Smoker     Packs/day: 0.50    Smokeless tobacco: Never Used    Tobacco comment: trying to cut back   Substance and Sexual Activity    Alcohol use: Yes     Comment: rare    Drug use: Yes     Types: Marijuana Alpheus Elisha)     Comment: daily    Sexual activity: Not on file   Other Topics Concern    Not on file   Social History Narrative    Not on file     Social Determinants of Health     Financial Resource Strain:     Difficulty of Paying Living Expenses: Not on file   Food Insecurity:     Worried About Running Out of Food in the Last Year: Not on file    Chong of Food in the Last Year: Not on file   Transportation Needs:     Lack of Transportation (Medical): Not on file    Lack of Transportation (Non-Medical):  Not on file   Physical Activity:     Days of Exercise per Week: Not on file    Minutes of Exercise per Session: Not on file   Stress:     Feeling of Stress : Not on file   Social Connections:     Frequency of Communication with Friends and Family: Not on file    Frequency of Social Gatherings with Friends and Family: Not on file    Attends [employer] Services: Not on file    Active Member of Clubs or Organizations: Not on file    Attends Club or Organization Meetings: Not on file    Marital Status: Not on file   Intimate Partner Violence:     Fear of Current or Ex-Partner: Not on file    Emotionally Abused: Not on file    Physically Abused: Not on file    Sexually Abused: Not on file   Housing Stability:     Unable to Pay for Housing in the Last Year: Not on file    Number of [place] in the Last Year: Not on file    Unstable Housing in the Last Year: Not on file     Family History   Problem Relation Age of Onset    Cancer Mother     High Blood Pressure Father     Asthma Brother      No Known Allergies    Current Outpatient Medications:     levothyroxine (SYNTHROID) 175 MCG tablet, Once a day, Disp: 30 tablet, Rfl: 0    atorvastatin (LIPITOR) 40 MG tablet, , Disp: , Rfl:     hydrOXYzine (ATARAX) 25 MG tablet, , Disp: , Rfl:     ELIQUIS 5 MG TABS tablet, , Disp: , Rfl:     PROMOGRAN FEMI WOUND DRESSING MATRIX, Apply topically Please dispense 1 box. Apply to left foot daily, Disp: 10 each, Rfl: 0    Gauze Pads & Dressings 2\"X2\" PADS, Apply to left foot once daily. , Disp: 30 each, Rfl: 3    Gauze Bandages (SOF-DEV CONFORMING BANDAGE) MISC, Apply to left foot once daily. , Disp: 96 each, Rfl: 3    Povidone-Iodine 7.5 % SWAB, Please apply to left 2nd digit daily with dressing change., Disp: 50 each, Rfl: 1    aspirin 81 MG tablet, Take 81 mg by mouth daily, Disp: , Rfl:     Cholecalciferol (VITAMIN D3) 2000 units TABS, Take by mouth, Disp: , Rfl:   Lab Results   Component Value Date     10/14/2021    K 4.4 10/14/2021     10/14/2021    CO2 28 10/14/2021    BUN 14 10/14/2021    CREATININE 0.94 10/14/2021    GLUCOSE 98 10/14/2021    CALCIUM 9.8 10/14/2021    LABGLOM >60 10/14/2021    GFRAA >60 10/14/2021     Lab Results   Component Value Date    WBC 14.6 (H) 10/14/2021    HGB 17.9 (H) 10/14/2021    HCT 53.7 (H) 10/14/2021    MCV 89.3 10/14/2021     (H) 10/14/2021     Lab Results   Component Value Date    TSH 0.39 03/11/2020    TSH 0.097 (L) 08/26/2019    TSH 0.663 01/04/2019    T4FREE 2.17 (H) 08/26/2019    T4FREE 1.68 01/04/2019    T4FREE 2.00 (H) 12/13/2018     No results found for: TPOABS    Review of Systems   Constitutional: Positive for fatigue. Endocrine: Negative. Skin: Positive for color change. All other systems reviewed and are negative. Objective:   Physical Exam  Vitals reviewed. Constitutional:       Appearance: Normal appearance. HENT:      Head: Normocephalic and atraumatic. Hair is normal.      Right Ear: External ear normal.      Left Ear: External ear normal.      Nose: Nose normal.   Eyes:      General: No scleral icterus. Right eye: No discharge. Left eye: No discharge. Extraocular Movements: Extraocular movements intact. Conjunctiva/sclera: Conjunctivae normal.   Neck:      Trachea: Trachea normal.   Cardiovascular:      Rate and Rhythm: Normal rate. Pulmonary:      Effort: Pulmonary effort is normal.   Musculoskeletal:         General: Normal range of motion. Cervical back: Normal range of motion and neck supple. Feet:    Neurological:      General: No focal deficit present. Mental Status: He is alert and oriented to person, place, and time.    Psychiatric:         Mood and Affect: Mood normal.         Behavior: Behavior normal.

## 2022-02-20 LAB
THYROGLOBULIN AB: <0.9 IU/ML (ref 0–4)
THYROGLOBULIN BY LC-MS/MS, SERUM/PLASMA: ABNORMAL NG/ML (ref 1.3–31.8)
THYROGLOBULIN, SERUM OR PLASMA: 0.1 NG/ML (ref 1.3–31.8)

## 2022-04-05 DIAGNOSIS — E89.0 POSTOPERATIVE HYPOTHYROIDISM: ICD-10-CM

## 2022-04-05 RX ORDER — LEVOTHYROXINE SODIUM 175 UG/1
TABLET ORAL
Qty: 90 TABLET | Refills: 3 | Status: SHIPPED | OUTPATIENT
Start: 2022-04-05

## 2022-04-05 NOTE — TELEPHONE ENCOUNTER
Pharmacy requesting medication refill.  Please approve or deny this request.    Rx requested:  Requested Prescriptions     Pending Prescriptions Disp Refills    levothyroxine (SYNTHROID) 175 MCG tablet 90 tablet 3     Sig: Once a day         Last Office Visit:   2/18/2022      Next Visit Date:  Future Appointments   Date Time Provider Maryuri Ferguson   5/20/2022  4:15 PM Nayeli Toure MD [facility]

## 2022-05-24 ENCOUNTER — TELEPHONE (OUTPATIENT)
Dept: ADMINISTRATIVE | Age: 42
End: 2022-05-24

## 2022-05-24 ENCOUNTER — TELEPHONE (OUTPATIENT)
Dept: PODIATRY | Age: 42
End: 2022-05-24

## 2022-05-24 NOTE — TELEPHONE ENCOUNTER
Patient is a pt of Dr. Gina Pizarro, he states his toe next to big toe on the right side is discolored, saw his primary care doctor today, was told to make appt , pt requesting Thursday, unable to reach office please call pt [phone]. [facility]/sc

## 2022-05-26 ENCOUNTER — OFFICE VISIT (OUTPATIENT)
Dept: PODIATRY | Age: 42
End: 2022-05-26
Payer: MEDICARE

## 2022-05-26 VITALS — WEIGHT: 220 LBS | HEIGHT: 74 IN | BODY MASS INDEX: 28.23 KG/M2

## 2022-05-26 DIAGNOSIS — I73.9 PERIPHERAL VASCULAR DISEASE (HCC): Primary | ICD-10-CM

## 2022-05-26 DIAGNOSIS — I99.8 ISCHEMIA OF TOE: ICD-10-CM

## 2022-05-26 DIAGNOSIS — M79.672 PAIN IN LEFT FOOT: ICD-10-CM

## 2022-05-26 DIAGNOSIS — M79.671 PAIN IN RIGHT FOOT: ICD-10-CM

## 2022-05-26 PROCEDURE — G8417 CALC BMI ABV UP PARAM F/U: HCPCS | Performed by: PODIATRIST

## 2022-05-26 PROCEDURE — 99213 OFFICE O/P EST LOW 20 MIN: CPT | Performed by: PODIATRIST

## 2022-05-26 PROCEDURE — G8427 DOCREV CUR MEDS BY ELIG CLIN: HCPCS | Performed by: PODIATRIST

## 2022-05-26 PROCEDURE — 4004F PT TOBACCO SCREEN RCVD TLK: CPT | Performed by: PODIATRIST

## 2022-05-26 RX ORDER — CILOSTAZOL 50 MG/1
50 TABLET ORAL 2 TIMES DAILY
Qty: 60 TABLET | Refills: 3 | Status: SHIPPED | OUTPATIENT
Start: 2022-05-26

## 2022-05-26 NOTE — PROGRESS NOTES
[address] [place] [address] Podiatry  Return Patient Progress Note    Subjective: [facility] 35 y.o. male that presents with concern of purple discoloration to the right second toe and left great toe. Chief Complaint   Patient presents with    Foot Pain     right 2nd toe very purple/ left foot very painful hallux starting to turn purple    Patient states that this has been present for about one week. Patient denies any injury to the right foot. Patient does have arterial disease and is currently taking ASA, Eliquis, and Lipitor. Pain is rated 4 out of 10 and is described as intermittent. Review of Systems   Constitutional: Negative for activity change, appetite change, chills, diaphoresis, fatigue and fever. Respiratory: Negative for shortness of breath. Cardiovascular: Negative for leg swelling. Gastrointestinal: Negative for diarrhea and nausea. Endocrine: Negative for cold intolerance, heat intolerance and polyuria. Musculoskeletal: Negative for arthralgias, back pain, gait problem, joint swelling and myalgias. Skin: Positive for color change. Negative for pallor, rash and wound. Allergic/Immunologic: Negative for environmental allergies and food allergies. Neurological: Negative for dizziness, weakness, light-headedness and numbness. Hematological: Does not bruise/bleed easily. Psychiatric/Behavioral: Negative for behavioral problems, confusion and self-injury. The patient is not nervous/anxious. Objective: Clinical evaluation of the patient reveals purple discoloration to the right second toe and the left hallux. There is a decrease in temperature to these two toes. There is no erythema or open wound noted to either of these toes. Pedal pulses are palpable and are biphasic on doppler to the bilateral DP and triphasic on doppler to the bilateral PT. Assessment:    Diagnosis Orders   1. Peripheral vascular disease (HCC)  cilostazol (PLETAL) 50 MG tablet   2.  Ischemia of toe  cilostazol (PLETAL) 50 MG tablet   3. Pain in right foot  cilostazol (PLETAL) 50 MG tablet   4. Pain in left foot  cilostazol (PLETAL) 50 MG tablet         Plan: 1. Clinical evaluation of the patient. 2. Patient informed that I believe he has microvascular arterial disease. Patient given a prescription for Pletal to help with this. Patient is to see his vascular surgeon and is to let him know that he is taking this medication. 3. Patient to return to the office as needed for care.    5/26/2022      Lew Hunter DPM

## 2022-05-27 ASSESSMENT — ENCOUNTER SYMPTOMS
SHORTNESS OF BREATH: 0
NAUSEA: 0
COLOR CHANGE: 1
DIARRHEA: 0
BACK PAIN: 0

## 2023-05-12 DIAGNOSIS — E89.0 POSTOPERATIVE HYPOTHYROIDISM: ICD-10-CM

## 2023-05-12 RX ORDER — LEVOTHYROXINE SODIUM 175 UG/1
TABLET ORAL
Qty: 90 TABLET | Refills: 3 | OUTPATIENT
Start: 2023-05-12

## 2023-05-16 DIAGNOSIS — E89.0 POSTOPERATIVE HYPOTHYROIDISM: ICD-10-CM

## 2023-05-17 ENCOUNTER — OFFICE VISIT (OUTPATIENT)
Dept: ENDOCRINOLOGY | Age: 43
End: 2023-05-17
Payer: MEDICARE

## 2023-05-17 VITALS
WEIGHT: 222 LBS | BODY MASS INDEX: 29.42 KG/M2 | HEIGHT: 73 IN | DIASTOLIC BLOOD PRESSURE: 64 MMHG | OXYGEN SATURATION: 97 % | SYSTOLIC BLOOD PRESSURE: 110 MMHG | HEART RATE: 61 BPM

## 2023-05-17 DIAGNOSIS — E89.0 POSTOPERATIVE HYPOTHYROIDISM: ICD-10-CM

## 2023-05-17 DIAGNOSIS — C73 PAPILLARY THYROID CARCINOMA (HCC): ICD-10-CM

## 2023-05-17 DIAGNOSIS — E89.0 POSTOPERATIVE HYPOTHYROIDISM: Primary | ICD-10-CM

## 2023-05-17 LAB
T4 FREE SERPL-MCNC: 1.48 NG/DL (ref 0.84–1.68)
TSH REFLEX: 0.1 UIU/ML (ref 0.44–3.86)

## 2023-05-17 PROCEDURE — 4004F PT TOBACCO SCREEN RCVD TLK: CPT | Performed by: INTERNAL MEDICINE

## 2023-05-17 PROCEDURE — 99213 OFFICE O/P EST LOW 20 MIN: CPT | Performed by: INTERNAL MEDICINE

## 2023-05-17 PROCEDURE — G8417 CALC BMI ABV UP PARAM F/U: HCPCS | Performed by: INTERNAL MEDICINE

## 2023-05-17 PROCEDURE — G8427 DOCREV CUR MEDS BY ELIG CLIN: HCPCS | Performed by: INTERNAL MEDICINE

## 2023-05-17 RX ORDER — HYDROXYUREA 500 MG/1
CAPSULE ORAL
COMMUNITY
Start: 2023-03-02

## 2023-05-17 RX ORDER — LEVOTHYROXINE SODIUM 175 UG/1
TABLET ORAL
Qty: 90 TABLET | Refills: 3 | Status: SHIPPED | OUTPATIENT
Start: 2023-05-17

## 2023-05-17 NOTE — PROGRESS NOTES
GLUCOSE 98 10/14/2021    CALCIUM 9.8 10/14/2021    LABGLOM >60 10/14/2021    GFRAA >60 10/14/2021     Lab Results   Component Value Date    WBC 14.6 (H) 10/14/2021    HGB 17.9 (H) 10/14/2021    HCT 53.7 (H) 10/14/2021    MCV 89.3 10/14/2021     (H) 10/14/2021     No results found for: LABA1C  No results found for: CHOLFAST, TRIGLYCFAST, HDL, LDLCALC, CHOL, TRIG  No results found for: TESTM  Lab Results   Component Value Date    TSH 1.730 02/18/2022    TSH 0.39 03/11/2020    TSH 0.097 (L) 08/26/2019    T4FREE 1.59 02/18/2022    T4FREE 2.17 (H) 08/26/2019    T4FREE 1.68 01/04/2019     No results found for: TPOABS    Review of Systems   Cardiovascular:  Negative for palpitations. Endocrine: Negative for cold intolerance and heat intolerance. Neurological:  Negative for tremors. Hematological:         Recent diagnosis of polycythemia vera   All other systems reviewed and are negative. Objective:   Physical Exam  Vitals reviewed. Constitutional:       General: He is not in acute distress. Appearance: Normal appearance. He is obese. HENT:      Head: Normocephalic and atraumatic. Right Ear: External ear normal.      Left Ear: External ear normal.      Nose: Nose normal.   Eyes:      General: No scleral icterus. Right eye: No discharge. Left eye: No discharge. Extraocular Movements: Extraocular movements intact. Conjunctiva/sclera: Conjunctivae normal.   Cardiovascular:      Rate and Rhythm: Normal rate. Pulmonary:      Effort: Pulmonary effort is normal.   Musculoskeletal:         General: Normal range of motion. Cervical back: Normal range of motion and neck supple. Neurological:      General: No focal deficit present. Mental Status: He is alert and oriented to person, place, and time.    Psychiatric:         Mood and Affect: Mood normal.         Behavior: Behavior normal.

## 2023-05-18 RX ORDER — LEVOTHYROXINE SODIUM 175 UG/1
TABLET ORAL
Qty: 90 TABLET | Refills: 3 | OUTPATIENT
Start: 2023-05-18

## 2023-05-19 LAB — THYROGLOB IGG SER-ACNC: <12 IU/ML (ref 0–40)

## 2024-05-16 DIAGNOSIS — E89.0 POSTOPERATIVE HYPOTHYROIDISM: ICD-10-CM

## 2024-05-17 RX ORDER — LEVOTHYROXINE SODIUM 175 UG/1
TABLET ORAL
Qty: 90 TABLET | Refills: 3 | Status: SHIPPED | OUTPATIENT
Start: 2024-05-17

## 2024-10-09 ENCOUNTER — HOSPITAL ENCOUNTER (OUTPATIENT)
Age: 44
Discharge: HOME | End: 2024-10-09
Payer: MEDICARE

## 2024-10-09 LAB — TSH SERPL DL<=0.05 MIU/L-ACNC: 3.83 UIU/ML (ref 0.27–4.2)

## 2024-10-09 PROCEDURE — 84443 ASSAY THYROID STIM HORMONE: CPT

## 2024-10-09 PROCEDURE — 36415 COLL VENOUS BLD VENIPUNCTURE: CPT

## 2024-10-09 PROCEDURE — 84432 ASSAY OF THYROGLOBULIN: CPT

## 2024-10-13 LAB — THYROGLOB SERPL-MCNC: <0.5 NG/ML (ref 1.3–31.8)

## 2024-10-15 NOTE — DISCHARGE INSTRUCTIONS
----------------------------------------------------------------------------------------------------------------                                                ENT  ~  Discharge Instructions   ----------------------------------------------------------------------------------------------------------------    You have undergone direct laryngoscopy / bronchoscopy with biopsy    ENT Surgeon: Dr. Fernández    What to Expect During Recovery:  - You may have a low grade fever (100-101 F) for 1-3 days   - You may experience mild nausea/vomiting for 1-3 days    When to Call ENT Nurse Line:  - If you show signs of dehydration such as dark colored urine and dry lips  - If you have excessive vomiting that lasts more than 12 hours  - If you have a fever higher than 101 F   - If you have any questions about medications or your recovery    When to Come to the Emergency Room or Call 911:  - If you are bleeding from their mouth or throat    - If you are having difficulty breathing  - If you are not able to stay awake  - If you are very sick and you feel that you need immediate medical attention    Activity & Limitations:  No restrictions. May return to work the day after surgery    Diet: Advance to regular diet as tolerated    Medications:   -Okay to take Tylenol (650mg) every 6 hours.     Follow-up:   Will call patient with results of biopsy     Useful Numbers:      ENT Nurse triage line     [phone] option 3 (ENT-related questions or concerns, 8am-4pm, Monday through Friday)   Main Office         [phone]  (to schedule routine appointments)    After hours contact number [phone] (After 4pm Monday through Friday and weekends; ask to speak with ENT provider on call)      No alcoholic beverages, no driving or operating machinery, no making important decisions for 24 hours.   You may have a normal diet but should eat lightly day of surgery.  Drink plenty of fluids.  Urinate within 8 hours after surgery, if unable to urinate

## 2024-10-16 ENCOUNTER — TELEPHONE (OUTPATIENT)
Dept: ONCOLOGY | Age: 44
End: 2024-10-16

## 2024-10-16 NOTE — TELEPHONE ENCOUNTER
Debra, PCC , alerted writer pt scheduled for dual MO/RO consults 10/28 to arrive @ 0845 for Dr. Norwood consult followed by 930 Dr. [place] consult.    Name: Wilmar Goldberg  : 1980  MRN: [id number]    Oncology Navigation- Initial Note:    Intake-  Contact Type: Telephone    Continuum of Care: Diagnosis/Active Treatment    Smoking hx: Current, 1/2 PPD, was 2 PPD, & started smoking @ 8 years old.  Discussed MH smoking cessation referral, pt declined.  Smoking cessation assistance and resources provided     Notes: Introductory phone call made to patient.  Instructed patient writer will be navigating oncology care & may call writer w/questions/concerns @ [phone] prn.  Discussed potential barriers to care, pt c/o dysphagia & denied weight loss.  Inquired on dental health, pt stated missing teeth & not seen by dentist in years.  Discussed need for dental clearance & requested pt contact medical insurance to inquire on in network dental providers.  Pt stated \"I don't fuck with dentist\" & stated pulled own teeth in past..  Encouraged pt to discuss further w/radiation oncologist, pt verbalized understanding & agreeable.  Inquired on alcohol/drug use.  Pt stated rarely drinks & smokes marijuana daily.  Pt updated on 10/28 dual MO/RO appt details.  Pt verbalized understanding & denied questions/concerns.  Encouraged to contact writer prn.  [facility] written materials along w/writer's business card mailed to patient.  Will continue to follow.     Electronically signed by Haley Wu RN on 10/16/2024 at 2:41 PM

## 2024-10-16 NOTE — TELEPHONE ENCOUNTER
Name: Wilmar Goldberg  : 1980  MRN: [id number]    Oncology Navigation- Initial Note:    Intake-  Contact Type: Telephone    Notes: Dr. Fernández requesting navigation.  Upon review of chart noted pt scheduled 10/21 for DL w/bx & referrals placed to MO/RO.  Attempted to contact pt, no answer, VM left notified writer navigating oncology care & requested contact writer @ [phone].  Will continue to follow.    Electronically signed by Haley Wu RN on 10/16/2024 at 9:01 AM

## 2024-10-17 ENCOUNTER — TELEPHONE (OUTPATIENT)
Dept: OTOLARYNGOLOGY | Age: 44
End: 2024-10-17

## 2024-10-17 DIAGNOSIS — G89.18 ACUTE POSTOPERATIVE PAIN: Primary | ICD-10-CM

## 2024-10-17 RX ORDER — ACETAMINOPHEN 325 MG/1
650 TABLET ORAL EVERY 6 HOURS PRN
Qty: 120 TABLET | Refills: 1 | Status: SHIPPED | OUTPATIENT
Start: 2024-10-17

## 2024-10-17 RX ORDER — IBUPROFEN 400 MG/1
400 TABLET, FILM COATED ORAL EVERY 6 HOURS PRN
Qty: 120 TABLET | Refills: 1 | Status: SHIPPED | OUTPATIENT
Start: 2024-10-17

## 2024-10-20 ENCOUNTER — ANESTHESIA EVENT (OUTPATIENT)
Dept: OPERATING ROOM | Age: 44
End: 2024-10-20
Payer: MEDICARE

## 2024-10-21 ENCOUNTER — HOSPITAL ENCOUNTER (OUTPATIENT)
Age: 44
Setting detail: OUTPATIENT SURGERY
Discharge: HOME | End: 2024-10-21
Attending: STUDENT IN AN ORGANIZED HEALTH CARE EDUCATION/TRAINING PROGRAM | Admitting: STUDENT IN AN ORGANIZED HEALTH CARE EDUCATION/TRAINING PROGRAM
Payer: MEDICARE

## 2024-10-21 ENCOUNTER — ANESTHESIA (OUTPATIENT)
Dept: OPERATING ROOM | Age: 44
End: 2024-10-21
Payer: MEDICARE

## 2024-10-21 VITALS
OXYGEN SATURATION: 94 % | WEIGHT: 225 LBS | HEART RATE: 52 BPM | RESPIRATION RATE: 15 BRPM | SYSTOLIC BLOOD PRESSURE: 112 MMHG | DIASTOLIC BLOOD PRESSURE: 73 MMHG | TEMPERATURE: 97 F | BODY MASS INDEX: 29.82 KG/M2 | HEIGHT: 73 IN

## 2024-10-21 DIAGNOSIS — R49.0 HOARSENESS: ICD-10-CM

## 2024-10-21 DIAGNOSIS — C73 PAPILLARY THYROID CARCINOMA (HCC): ICD-10-CM

## 2024-10-21 PROCEDURE — 2580000003 HC RX 258: Performed by: ANESTHESIOLOGY

## 2024-10-21 PROCEDURE — 88342 IMHCHEM/IMCYTCHM 1ST ANTB: CPT

## 2024-10-21 PROCEDURE — 7100000010 HC PHASE II RECOVERY - FIRST 15 MIN: Performed by: STUDENT IN AN ORGANIZED HEALTH CARE EDUCATION/TRAINING PROGRAM

## 2024-10-21 PROCEDURE — 3700000001 HC ADD 15 MINUTES (ANESTHESIA): Performed by: STUDENT IN AN ORGANIZED HEALTH CARE EDUCATION/TRAINING PROGRAM

## 2024-10-21 PROCEDURE — 7100000011 HC PHASE II RECOVERY - ADDTL 15 MIN: Performed by: STUDENT IN AN ORGANIZED HEALTH CARE EDUCATION/TRAINING PROGRAM

## 2024-10-21 PROCEDURE — 6360000002 HC RX W HCPCS: Performed by: ANESTHESIOLOGY

## 2024-10-21 PROCEDURE — 6360000002 HC RX W HCPCS: Performed by: NURSE ANESTHETIST, CERTIFIED REGISTERED

## 2024-10-21 PROCEDURE — 31535 LARYNGOSCOPY W/BIOPSY: CPT | Performed by: STUDENT IN AN ORGANIZED HEALTH CARE EDUCATION/TRAINING PROGRAM

## 2024-10-21 PROCEDURE — 88305 TISSUE EXAM BY PATHOLOGIST: CPT

## 2024-10-21 PROCEDURE — 3700000000 HC ANESTHESIA ATTENDED CARE: Performed by: STUDENT IN AN ORGANIZED HEALTH CARE EDUCATION/TRAINING PROGRAM

## 2024-10-21 PROCEDURE — 6370000000 HC RX 637 (ALT 250 FOR IP): Performed by: ANESTHESIOLOGY

## 2024-10-21 PROCEDURE — 2500000003 HC RX 250 WO HCPCS: Performed by: ANESTHESIOLOGY

## 2024-10-21 PROCEDURE — 88341 IMHCHEM/IMCYTCHM EA ADD ANTB: CPT

## 2024-10-21 PROCEDURE — 3600000014 HC SURGERY LEVEL 4 ADDTL 15MIN: Performed by: STUDENT IN AN ORGANIZED HEALTH CARE EDUCATION/TRAINING PROGRAM

## 2024-10-21 PROCEDURE — 2709999900 HC NON-CHARGEABLE SUPPLY: Performed by: STUDENT IN AN ORGANIZED HEALTH CARE EDUCATION/TRAINING PROGRAM

## 2024-10-21 PROCEDURE — 7100000000 HC PACU RECOVERY - FIRST 15 MIN: Performed by: STUDENT IN AN ORGANIZED HEALTH CARE EDUCATION/TRAINING PROGRAM

## 2024-10-21 PROCEDURE — 3600000004 HC SURGERY LEVEL 4 BASE: Performed by: STUDENT IN AN ORGANIZED HEALTH CARE EDUCATION/TRAINING PROGRAM

## 2024-10-21 PROCEDURE — 2500000003 HC RX 250 WO HCPCS: Performed by: NURSE ANESTHETIST, CERTIFIED REGISTERED

## 2024-10-21 PROCEDURE — 7100000001 HC PACU RECOVERY - ADDTL 15 MIN: Performed by: STUDENT IN AN ORGANIZED HEALTH CARE EDUCATION/TRAINING PROGRAM

## 2024-10-21 PROCEDURE — 88331 PATH CONSLTJ SURG 1 BLK 1SPC: CPT

## 2024-10-21 PROCEDURE — 6370000000 HC RX 637 (ALT 250 FOR IP): Performed by: STUDENT IN AN ORGANIZED HEALTH CARE EDUCATION/TRAINING PROGRAM

## 2024-10-21 PROCEDURE — 2580000003 HC RX 258: Performed by: STUDENT IN AN ORGANIZED HEALTH CARE EDUCATION/TRAINING PROGRAM

## 2024-10-21 RX ORDER — LIDOCAINE HYDROCHLORIDE 40 MG/ML
INJECTION, SOLUTION RETROBULBAR
Status: DISCONTINUED | OUTPATIENT
Start: 2024-10-21 | End: 2024-10-21 | Stop reason: SDUPTHER

## 2024-10-21 RX ORDER — DEXMEDETOMIDINE HYDROCHLORIDE 4 UG/ML
INJECTION, SOLUTION INTRAVENOUS
Status: DISCONTINUED | OUTPATIENT
Start: 2024-10-21 | End: 2024-10-21

## 2024-10-21 RX ORDER — LIDOCAINE HYDROCHLORIDE 40 MG/ML
SOLUTION TOPICAL PRN
Status: DISCONTINUED | OUTPATIENT
Start: 2024-10-21 | End: 2024-10-21 | Stop reason: HOSPADM

## 2024-10-21 RX ORDER — OXYMETAZOLINE HYDROCHLORIDE 0.05 G/100ML
SPRAY NASAL PRN
Status: DISCONTINUED | OUTPATIENT
Start: 2024-10-21 | End: 2024-10-21 | Stop reason: HOSPADM

## 2024-10-21 RX ORDER — LABETALOL HYDROCHLORIDE 5 MG/ML
10 INJECTION, SOLUTION INTRAVENOUS
Status: DISCONTINUED | OUTPATIENT
Start: 2024-10-21 | End: 2024-10-21 | Stop reason: HOSPADM

## 2024-10-21 RX ORDER — IPRATROPIUM BROMIDE AND ALBUTEROL SULFATE 2.5; .5 MG/3ML; MG/3ML
1 SOLUTION RESPIRATORY (INHALATION) ONCE
Status: COMPLETED | OUTPATIENT
Start: 2024-10-21 | End: 2024-10-21

## 2024-10-21 RX ORDER — ONDANSETRON 2 MG/ML
INJECTION INTRAMUSCULAR; INTRAVENOUS
Status: DISCONTINUED | OUTPATIENT
Start: 2024-10-21 | End: 2024-10-21 | Stop reason: SDUPTHER

## 2024-10-21 RX ORDER — SODIUM CHLORIDE 0.9 % (FLUSH) 0.9 %
5-40 SYRINGE (ML) INJECTION PRN
Status: DISCONTINUED | OUTPATIENT
Start: 2024-10-21 | End: 2024-10-21 | Stop reason: HOSPADM

## 2024-10-21 RX ORDER — HYDRALAZINE HYDROCHLORIDE 20 MG/ML
10 INJECTION INTRAMUSCULAR; INTRAVENOUS
Status: DISCONTINUED | OUTPATIENT
Start: 2024-10-21 | End: 2024-10-21 | Stop reason: HOSPADM

## 2024-10-21 RX ORDER — MIDAZOLAM HYDROCHLORIDE 1 MG/ML
INJECTION, SOLUTION INTRAMUSCULAR; INTRAVENOUS
Status: DISCONTINUED | OUTPATIENT
Start: 2024-10-21 | End: 2024-10-21 | Stop reason: SDUPTHER

## 2024-10-21 RX ORDER — GLYCOPYRROLATE 1 MG/5 ML
SYRINGE (ML) INTRAVENOUS
Status: DISCONTINUED | OUTPATIENT
Start: 2024-10-21 | End: 2024-10-21 | Stop reason: SDUPTHER

## 2024-10-21 RX ORDER — MAGNESIUM HYDROXIDE 1200 MG/15ML
LIQUID ORAL CONTINUOUS PRN
Status: DISCONTINUED | OUTPATIENT
Start: 2024-10-21 | End: 2024-10-21 | Stop reason: HOSPADM

## 2024-10-21 RX ORDER — ROCURONIUM BROMIDE 10 MG/ML
INJECTION, SOLUTION INTRAVENOUS
Status: DISCONTINUED | OUTPATIENT
Start: 2024-10-21 | End: 2024-10-21 | Stop reason: SDUPTHER

## 2024-10-21 RX ORDER — SODIUM CHLORIDE 0.9 % (FLUSH) 0.9 %
5-40 SYRINGE (ML) INJECTION EVERY 12 HOURS SCHEDULED
Status: DISCONTINUED | OUTPATIENT
Start: 2024-10-21 | End: 2024-10-21 | Stop reason: HOSPADM

## 2024-10-21 RX ORDER — NALOXONE HYDROCHLORIDE 0.4 MG/ML
INJECTION, SOLUTION INTRAMUSCULAR; INTRAVENOUS; SUBCUTANEOUS PRN
Status: DISCONTINUED | OUTPATIENT
Start: 2024-10-21 | End: 2024-10-21 | Stop reason: HOSPADM

## 2024-10-21 RX ORDER — DEXAMETHASONE SODIUM PHOSPHATE 10 MG/ML
INJECTION, SOLUTION INTRAMUSCULAR; INTRAVENOUS
Status: DISCONTINUED | OUTPATIENT
Start: 2024-10-21 | End: 2024-10-21 | Stop reason: SDUPTHER

## 2024-10-21 RX ORDER — DEXMEDETOMIDINE HYDROCHLORIDE 100 UG/ML
INJECTION, SOLUTION INTRAVENOUS
Status: DISCONTINUED | OUTPATIENT
Start: 2024-10-21 | End: 2024-10-21 | Stop reason: SDUPTHER

## 2024-10-21 RX ORDER — SODIUM CHLORIDE 9 MG/ML
INJECTION, SOLUTION INTRAVENOUS PRN
Status: DISCONTINUED | OUTPATIENT
Start: 2024-10-21 | End: 2024-10-21 | Stop reason: HOSPADM

## 2024-10-21 RX ORDER — ONDANSETRON 2 MG/ML
4 INJECTION INTRAMUSCULAR; INTRAVENOUS
Status: DISCONTINUED | OUTPATIENT
Start: 2024-10-21 | End: 2024-10-21 | Stop reason: HOSPADM

## 2024-10-21 RX ORDER — PROPOFOL 10 MG/ML
INJECTION, EMULSION INTRAVENOUS
Status: DISCONTINUED | OUTPATIENT
Start: 2024-10-21 | End: 2024-10-21 | Stop reason: SDUPTHER

## 2024-10-21 RX ORDER — FENTANYL CITRATE 50 UG/ML
INJECTION, SOLUTION INTRAMUSCULAR; INTRAVENOUS
Status: DISCONTINUED | OUTPATIENT
Start: 2024-10-21 | End: 2024-10-21 | Stop reason: SDUPTHER

## 2024-10-21 RX ADMIN — Medication 25 MG: at 12:08

## 2024-10-21 RX ADMIN — DEXMEDETOMIDINE HYDROCHLORIDE 8 MCG: 100 INJECTION, SOLUTION INTRAVENOUS at 12:17

## 2024-10-21 RX ADMIN — PROPOFOL 50 MG: 10 INJECTION, EMULSION INTRAVENOUS at 12:10

## 2024-10-21 RX ADMIN — Medication 25 MG: at 12:11

## 2024-10-21 RX ADMIN — HYDROMORPHONE HYDROCHLORIDE 0.5 MG: 1 INJECTION, SOLUTION INTRAMUSCULAR; INTRAVENOUS; SUBCUTANEOUS at 13:56

## 2024-10-21 RX ADMIN — DEXMEDETOMIDINE HYDROCHLORIDE 4 MCG: 100 INJECTION, SOLUTION INTRAVENOUS at 12:12

## 2024-10-21 RX ADMIN — FENTANYL CITRATE 25 MCG: 50 INJECTION, SOLUTION INTRAMUSCULAR; INTRAVENOUS at 12:46

## 2024-10-21 RX ADMIN — DEXAMETHASONE SODIUM PHOSPHATE 12 MG: 10 INJECTION INTRAMUSCULAR; INTRAVENOUS at 12:43

## 2024-10-21 RX ADMIN — FENTANYL CITRATE 25 MCG: 50 INJECTION, SOLUTION INTRAMUSCULAR; INTRAVENOUS at 12:09

## 2024-10-21 RX ADMIN — LIDOCAINE HYDROCHLORIDE 5 ML: 40 INJECTION, SOLUTION RETROBULBAR; TOPICAL at 11:21

## 2024-10-21 RX ADMIN — DEXMEDETOMIDINE HYDROCHLORIDE 8 MCG: 100 INJECTION, SOLUTION INTRAVENOUS at 12:10

## 2024-10-21 RX ADMIN — SODIUM CHLORIDE: 9 INJECTION, SOLUTION INTRAVENOUS at 13:05

## 2024-10-21 RX ADMIN — DEXMEDETOMIDINE HYDROCHLORIDE 4 MCG: 100 INJECTION, SOLUTION INTRAVENOUS at 12:06

## 2024-10-21 RX ADMIN — HYDROMORPHONE HYDROCHLORIDE 0.5 MG: 1 INJECTION, SOLUTION INTRAMUSCULAR; INTRAVENOUS; SUBCUTANEOUS at 14:11

## 2024-10-21 RX ADMIN — PROPOFOL 50 MG: 10 INJECTION, EMULSION INTRAVENOUS at 13:02

## 2024-10-21 RX ADMIN — SODIUM CHLORIDE: 9 INJECTION, SOLUTION INTRAVENOUS at 12:02

## 2024-10-21 RX ADMIN — ONDANSETRON 4 MG: 2 INJECTION INTRAMUSCULAR; INTRAVENOUS at 13:00

## 2024-10-21 RX ADMIN — Medication 0.2 MG: at 12:07

## 2024-10-21 RX ADMIN — ROCURONIUM BROMIDE 30 MG: 10 INJECTION INTRAVENOUS at 12:25

## 2024-10-21 RX ADMIN — DEXMEDETOMIDINE HYDROCHLORIDE 4 MCG: 100 INJECTION, SOLUTION INTRAVENOUS at 12:04

## 2024-10-21 RX ADMIN — Medication 25 MG: at 12:14

## 2024-10-21 RX ADMIN — MIDAZOLAM 2 MG: 1 INJECTION INTRAMUSCULAR; INTRAVENOUS at 12:06

## 2024-10-21 RX ADMIN — DEXMEDETOMIDINE HYDROCHLORIDE 8 MCG: 100 INJECTION, SOLUTION INTRAVENOUS at 12:14

## 2024-10-21 RX ADMIN — PROPOFOL 50 MG: 10 INJECTION, EMULSION INTRAVENOUS at 12:08

## 2024-10-21 RX ADMIN — PROPOFOL 50 MG: 10 INJECTION, EMULSION INTRAVENOUS at 12:05

## 2024-10-21 RX ADMIN — DEXMEDETOMIDINE HYDROCHLORIDE 4 MCG: 100 INJECTION, SOLUTION INTRAVENOUS at 12:08

## 2024-10-21 RX ADMIN — FENTANYL CITRATE 50 MCG: 50 INJECTION, SOLUTION INTRAMUSCULAR; INTRAVENOUS at 12:24

## 2024-10-21 RX ADMIN — PROPOFOL 50 MG: 10 INJECTION, EMULSION INTRAVENOUS at 12:14

## 2024-10-21 RX ADMIN — IPRATROPIUM BROMIDE AND ALBUTEROL SULFATE 1 DOSE: .5; 2.5 SOLUTION RESPIRATORY (INHALATION) at 10:36

## 2024-10-21 RX ADMIN — FENTANYL CITRATE 25 MCG: 50 INJECTION, SOLUTION INTRAMUSCULAR; INTRAVENOUS at 12:14

## 2024-10-21 RX ADMIN — Medication 25 MG: at 12:05

## 2024-10-21 RX ADMIN — SUGAMMADEX 200 MG: 100 INJECTION, SOLUTION INTRAVENOUS at 13:00

## 2024-10-21 ASSESSMENT — PAIN DESCRIPTION - PAIN TYPE
TYPE: ACUTE PAIN
TYPE: SURGICAL PAIN
TYPE: ACUTE PAIN

## 2024-10-21 ASSESSMENT — PAIN - FUNCTIONAL ASSESSMENT
PAIN_FUNCTIONAL_ASSESSMENT: 0-10
PAIN_FUNCTIONAL_ASSESSMENT: FACE, LEGS, ACTIVITY, CRY, AND CONSOLABILITY (FLACC)

## 2024-10-21 ASSESSMENT — PAIN DESCRIPTION - DESCRIPTORS
DESCRIPTORS: SHARP
DESCRIPTORS: BURNING;THROBBING;SHARP
DESCRIPTORS: SHARP
DESCRIPTORS: BURNING;THROBBING

## 2024-10-21 ASSESSMENT — PAIN DESCRIPTION - ORIENTATION
ORIENTATION: INNER
ORIENTATION: INNER

## 2024-10-21 ASSESSMENT — PAIN SCALES - GENERAL
PAINLEVEL_OUTOF10: 7
PAINLEVEL_OUTOF10: 10
PAINLEVEL_OUTOF10: 8
PAINLEVEL_OUTOF10: 8

## 2024-10-21 ASSESSMENT — PAIN DESCRIPTION - FREQUENCY
FREQUENCY: CONTINUOUS
FREQUENCY: CONTINUOUS

## 2024-10-21 ASSESSMENT — PAIN DESCRIPTION - LOCATION
LOCATION: THROAT

## 2024-10-21 ASSESSMENT — PAIN DESCRIPTION - ONSET: ONSET: ON-GOING

## 2024-10-21 NOTE — ANESTHESIA PRE PROCEDURE
Department of Anesthesiology  Preprocedure Note       Name:  Wilmar Goldberg   Age:  44 y.o.  :  1980                                          MRN:  [id number]         Date:  10/21/2024      Surgeon: Surgeon(s):  Peter Fernández MD    Procedure: Procedure(s):  DIRECT LARYNGOSCOPY, BIOPSIES  POSSIBLE FINE NEEDLE ASPIRATION OF NECK, POSSIBLE EXCISIONAL LYMPH NODE BIOPSY  (ULTRASOUND FOR CORE NEEDLE BIOPSY, WILL SEND FROZEN)    Medications prior to admission:   Prior to Admission medications    Medication Sig Start Date End Date Taking? Authorizing Provider   ibuprofen (IBU) 400 MG tablet Take 1 tablet by mouth every 6 hours as needed for Pain (1-2 tabs (400-800mg) to alternate with Tylenol so that you are taking something for pain every 3 hours) 10/17/24  Yes Ryan Zarate FNP   acetaminophen (TYLENOL) 325 MG tablet Take 2 tablets by mouth every 6 hours as needed for Pain Alternate with Motrin so that you are taking something for pain every 3 hours 10/17/24  Yes Ryan Zarate FNP   carBAMazepine (CARBATROL) 300 MG extended release capsule Take 1 capsule by mouth 2 times daily 24 Yes Boris Garcia MD   levothyroxine (SYNTHROID) 175 MCG tablet TAKE 1 TABLET BY MOUTH DAILY 24  Yes Jordon Avina MD   hydroxyurea (HYDREA) 500 MG chemo capsule  3/2/23  Yes Boris Garcia MD   ELIQUIS 5 MG TABS tablet  21  Yes Boris Garcia MD   aspirin 81 MG tablet Take 1 tablet by mouth daily   Yes Boris Garcia MD   atorvastatin (LIPITOR) 40 MG tablet  21   ProviderBoris MD       Current medications:    No current facility-administered medications for this encounter.       Allergies:  No Known Allergies    Problem List:    Patient Active Problem List   Diagnosis Code    Thyroid cancer (HCC) C73    Hypothyroidism E03.9    Papillary thyroid carcinoma (HCC) C73    Arterial embolism (HCC) I74.9    Tobacco abuse Z72.0    Cellulitis of third toe of left foot L03.032

## 2024-10-21 NOTE — BRIEF OP NOTE
Brief Postoperative Note      Patient: Wilmar Goldberg  YOB: 1980  MRN: [id number]    Date of Procedure: 10/21/2024    Pre-Op Diagnosis Codes:      * Papillary thyroid carcinoma (HCC) [C73]     * Hoarseness [R49.0]    Post-Op Diagnosis: Same       Procedure(s):  DIRECT LARYNGOSCOPY, BIOPSIES    Surgeon(s):  Peter Fernández MD    Assistant:  * No surgical staff found *    Anesthesia: General    Estimated Blood Loss (mL): Minimal    Complications: None    Specimens:   ID Type Source Tests Collected by Time Destination   A : RIGHT LARYNGEAL EPIGLOTTIS Tissue Larynx SURGICAL PATHOLOGY Peter Fernández MD 10/21/2024 1237    B : RIGHT LARYNGEAL EPIGLOTTIS Tissue Larynx SURGICAL PATHOLOGY Peter Fernández MD 10/21/2024 1242        Implants:  No      Drains:   None      Findings:  Infection Present At Time Of Surgery (PATOS) (choose all levels that have infection present):  No infection present  Other Findings: Large supraglottic mass involving the laryngeal epiglottis extending onto right false vocal fold. Frozen pathology consistent with SCC  This procedure was not performed to treat cancer       Electronically signed by Peter Fernández MD on 10/21/2024 at 1:01 PM

## 2024-10-21 NOTE — H&P
History and Physical    Pt Name: Wilmar Goldberg  MRN: [id number]  YOB: 1980  Date of evaluation: 10/21/2024  Primary Care Physician: Irene Braga APRN - NP    SUBJECTIVE:   History of Chief Complaint:    Wilmar Goldberg is a 44 y.o. male who is scheduled today for DIRECT LARYNGOSCOPY, BIOPSIES, POSSIBLE FINE NEEDLE ASPIRATION OF NECK, POSSIBLE EXCISIONAL LYMPH NODE BIOPSY. He reports complaints of voice hoarseness, supraglottic mass, and reports trouble swallowing and swollen neck lymph nodes. He reports symptom onset was \"second week of July\", 2024. He rates his related pain a 10/10. He reports being a one ppd cigarette smoker.   Allergies  has No Known Allergies.  Medications  Prior to Admission medications    Medication Sig Start Date End Date Taking? Authorizing Provider   ibuprofen (IBU) 400 MG tablet Take 1 tablet by mouth every 6 hours as needed for Pain (1-2 tabs (400-800mg) to alternate with Tylenol so that you are taking something for pain every 3 hours) 10/17/24  Yes Ryan Zarate FNP   acetaminophen (TYLENOL) 325 MG tablet Take 2 tablets by mouth every 6 hours as needed for Pain Alternate with Motrin so that you are taking something for pain every 3 hours 10/17/24  Yes Ryan Zarate FNP   carBAMazepine (CARBATROL) 300 MG extended release capsule Take 1 capsule by mouth 2 times daily 7/9/24 12/6/24 Yes Boris Garcia MD   levothyroxine (SYNTHROID) 175 MCG tablet TAKE 1 TABLET BY MOUTH DAILY 5/17/24  Yes Jordon Avina MD   hydroxyurea (HYDREA) 500 MG chemo capsule  3/2/23  Yes Boris Garcia MD   ELIQUIS 5 MG TABS tablet  12/9/21  Yes Boris Garcia MD   aspirin 81 MG tablet Take 1 tablet by mouth daily   Yes Boris Garcia MD   atorvastatin (LIPITOR) 40 MG tablet  12/22/21   ProviderBoris MD     Past Medical History    has a past medical history of Cancer (HCC), Gangrene (HCC), Headache, Hoarseness of voice, Hx of blood clots, Hypothyroidism,

## 2024-10-22 DIAGNOSIS — J38.7 SUPRAGLOTTIC MASS: ICD-10-CM

## 2024-10-22 DIAGNOSIS — C73 PAPILLARY THYROID CARCINOMA (HCC): ICD-10-CM

## 2024-10-22 DIAGNOSIS — C32.9 LARYNX CANCER (HCC): Primary | ICD-10-CM

## 2024-10-22 NOTE — PROGRESS NOTES
ORDER NOTE    -Order for PET and CT neck placed today.     Patient requires repeat CT neck for tumor staging.  Specifically, must assess postcricoid area, paraglottic space, thyroid cartilage integrity, preepiglottic space, etc.  This cannot be assessed fully with PET alone    Peter Fernández MD  Otolaryngology - Head and Neck Surgery  [facility] @ [facility]

## 2024-10-22 NOTE — OP NOTE
OPERATIVE REPORT    PATIENT NAME: Wilmar Goldberg    MRN#: [id number]    : 1980    DATE OF SURGERY:10/21/2024    Service: Otolaryngology    Surgeons and Role:     * Peter Fernández MD - Primary      Assistant:   Mukesh Michelle MD - Resident Surgeon     Preoperative Diagnosis:   Papillary thyroid carcinoma (HCC) [C73]  Hoarseness [R49.0]     Postoperative Diagnosis:   same    Procedure:   DIRECT LARYNGOSCOPY, BIOPSIES, N/A     Anesthesia Type:   General Endotracheal    Complications:  None     Estimated Blood Loss:   Minimal    Pathologic Specimen:   ID Type Source Tests Collected by Time Destination   A : RIGHT LARYNGEAL EPIGLOTTIS Tissue Larynx SURGICAL PATHOLOGY Peter Fernández MD 10/21/2024 1237    B : RIGHT LARYNGEAL EPIGLOTTIS Tissue Larynx SURGICAL PATHOLOGY Peter Fernández MD 10/21/2024 1242          Operative Findings:   Larynx: abnormal - Large supraglottic mass involving the laryngeal epiglottis extending onto right false vocal fold. Frozen pathology consistent with SCC     Grade I view without cricoid pressure - [place] laryngoscope used      Tonsils: Normal  Base of Tongue: Normal    Epiglottis: abnormal - see above  False folds: abnormal - see above  AE folds: normal  Arytenoids: normal    Glottis:    Vocal folds:  abnormal - Right vocal fold edema    Subglottis:  normal    INDICATIONS AND CONSENT  The patient was seen and evaluated by the Otolaryngology practice.  After history and physical examination, recommendations were made to proceed to the operating room for the above listed procedures.  Indications, risks and benefits were discussed with the patient, who agreed to proceed and signed proper informed consent.    DESCRIPTION OF PROCEDURE:  The patient was taken to the operating room and laid supine on the operating room table. General mask inhalational anesthesia was induced by the anesthesiology team.   Proper surgeon-initiated time-out was performed.  Once an adequate level of

## 2024-10-24 LAB — SURGICAL PATHOLOGY REPORT: NORMAL

## 2024-10-26 NOTE — ANESTHESIA POSTPROCEDURE EVALUATION
Department of Anesthesiology  Postprocedure Note    Patient: Wilmar Goldberg  MRN: [id number]  YOB: 1980  Date of evaluation: 10/26/2024    Procedure Summary       Date: 10/21/24 Room / Location: [address]    Anesthesia Start: 1202 Anesthesia Stop: 1330    Procedure: DIRECT LARYNGOSCOPY, BIOPSIES Diagnosis:       Papillary thyroid carcinoma (HCC)      Hoarseness      (Papillary thyroid carcinoma (HCC) [C73])      (Hoarseness [R49.0])    Surgeons: Peter Fernández MD Responsible Provider: Vin Ivan MD    Anesthesia Type: general ASA Status: 2            Anesthesia Type: No value filed.    Cassy Phase I: Cassy Score: 8    Cassy Phase II: Cassy Score: 10    Anesthesia Post Evaluation    Patient location during evaluation: PACU  Patient participation: complete - patient participated  Level of consciousness: awake  Airway patency: patent  Nausea & Vomiting: no nausea and no vomiting  Cardiovascular status: blood pressure returned to baseline  Respiratory status: acceptable  Hydration status: euvolemic  Comments: /73   Pulse 52   Temp 97 °F (36.1 °C)   Resp 15   Ht 1.854 m (6' 1\")   Wt 102.1 kg (225 lb)   SpO2 94%   BMI 29.69 kg/m²     Pain management: adequate        No notable events documented.

## 2024-10-28 ENCOUNTER — TELEPHONE (OUTPATIENT)
Dept: ONCOLOGY | Age: 44
End: 2024-10-28

## 2024-10-28 ENCOUNTER — INITIAL CONSULT (OUTPATIENT)
Dept: ONCOLOGY | Age: 44
End: 2024-10-28
Payer: MEDICARE

## 2024-10-28 VITALS
DIASTOLIC BLOOD PRESSURE: 72 MMHG | OXYGEN SATURATION: 99 % | HEART RATE: 65 BPM | HEIGHT: 73 IN | WEIGHT: 225.2 LBS | RESPIRATION RATE: 18 BRPM | TEMPERATURE: 98 F | BODY MASS INDEX: 29.85 KG/M2 | SYSTOLIC BLOOD PRESSURE: 108 MMHG

## 2024-10-28 DIAGNOSIS — D45 POLYCYTHEMIA VERA (HCC): ICD-10-CM

## 2024-10-28 DIAGNOSIS — Z72.0 TOBACCO ABUSE: ICD-10-CM

## 2024-10-28 DIAGNOSIS — C32.9 LARYNGEAL CANCER (HCC): Primary | ICD-10-CM

## 2024-10-28 DIAGNOSIS — C73 PAPILLARY THYROID CARCINOMA (HCC): ICD-10-CM

## 2024-10-28 DIAGNOSIS — C32.9 LARYNGEAL CANCER (HCC): ICD-10-CM

## 2024-10-28 DIAGNOSIS — C73 THYROID CANCER (HCC): Primary | ICD-10-CM

## 2024-10-28 PROCEDURE — 99205 OFFICE O/P NEW HI 60 MIN: CPT | Performed by: INTERNAL MEDICINE

## 2024-10-28 PROCEDURE — G8427 DOCREV CUR MEDS BY ELIG CLIN: HCPCS | Performed by: INTERNAL MEDICINE

## 2024-10-28 PROCEDURE — G8484 FLU IMMUNIZE NO ADMIN: HCPCS | Performed by: INTERNAL MEDICINE

## 2024-10-28 PROCEDURE — G8419 CALC BMI OUT NRM PARAM NOF/U: HCPCS | Performed by: INTERNAL MEDICINE

## 2024-10-28 NOTE — TELEPHONE ENCOUNTER
Instructions   from Dr. Neris Norwood MD    Navigator to transfer the care to [place].   RV PRN         Called [facility] to cancelled today's appointment per Dr. Cohen.  RV PRN  Note routed navigator.

## 2024-10-28 NOTE — TELEPHONE ENCOUNTER
Name: Wilmar Goldberg  : 1980  MRN: [id number]    Oncology Navigation Follow-Up Note    Contact Type:  Telephone    Notes: Johanny, PCC , alerted writer pt requesting transfer of care to [place].  Spoke w/pt to inquire on cancer center.  Pt requested referral sent to [facility].  Referrals placed for medical & radiation oncology.  Will continue to follow.      Electronically signed by Haley Wu RN on 10/28/2024 at 3:03 PM

## 2024-10-29 ENCOUNTER — TELEPHONE (OUTPATIENT)
Dept: ONCOLOGY | Age: 44
End: 2024-10-29

## 2024-10-30 ENCOUNTER — TELEPHONE (OUTPATIENT)
Dept: ONCOLOGY | Age: 44
End: 2024-10-30

## 2024-10-30 NOTE — TELEPHONE ENCOUNTER
Name: Wilmar Goldberg  : 1980  MRN: [id number]    Oncology Navigation Follow-Up Note    Contact Type:  Telephone    Notes: Upon review of [facility] care everywhere noted pt scheduled 10/31 w/Dr. Hawkins, [facility].  Attempted to contact pt, no answer, VM left updated on findings, notified navigation ended, & encouraged to contact prn.      Electronically signed by Haley Wu RN on 10/30/2024 at 10:01 AM

## 2025-05-27 DIAGNOSIS — E89.0 POSTOPERATIVE HYPOTHYROIDISM: ICD-10-CM

## 2025-05-27 DIAGNOSIS — E89.0 POSTOPERATIVE HYPOTHYROIDISM: Primary | ICD-10-CM

## 2025-05-27 RX ORDER — LEVOTHYROXINE SODIUM 175 UG/1
175 TABLET ORAL DAILY
Qty: 90 TABLET | Refills: 3 | OUTPATIENT
Start: 2025-05-27

## 2025-05-27 NOTE — TELEPHONE ENCOUNTER
Requested Prescriptions     Pending Prescriptions Disp Refills    levothyroxine (SYNTHROID) 175 MCG tablet [Pharmacy Med Name: LEVOTHYROXINE 175 MCG TABLET] 90 tablet 3     Sig: TAKE 1 TABLET BY MOUTH DAILY      Patient   has had cancer and has been struggling to get into an appt please refill and he is scheduled 6-2-25

## 2025-05-28 ENCOUNTER — TELEPHONE (OUTPATIENT)
Age: 45
End: 2025-05-28

## 2025-05-28 ENCOUNTER — HOSPITAL ENCOUNTER (OUTPATIENT)
Age: 45
Setting detail: SPECIMEN
Discharge: HOME | End: 2025-05-28
Payer: MEDICARE

## 2025-05-28 DIAGNOSIS — E89.0 POSTOPERATIVE HYPOTHYROIDISM: ICD-10-CM

## 2025-05-28 DIAGNOSIS — E89.0 POSTOPERATIVE HYPOTHYROIDISM: Primary | ICD-10-CM

## 2025-05-28 LAB
ANION GAP SERPL CALCULATED.3IONS-SCNC: 8 MMOL/L (ref 9–16)
BUN SERPL-MCNC: 14 MG/DL (ref 6–20)
CALCIUM SERPL-MCNC: 9.5 MG/DL (ref 8.6–10.4)
CHLORIDE SERPL-SCNC: 103 MMOL/L (ref 98–107)
CO2 SERPL-SCNC: 28 MMOL/L (ref 20–31)
CREAT SERPL-MCNC: 0.9 MG/DL (ref 0.7–1.2)
GFR, ESTIMATED: >90 ML/MIN/1.73M2
GLUCOSE SERPL-MCNC: 101 MG/DL (ref 74–99)
POTASSIUM SERPL-SCNC: 4.1 MMOL/L (ref 3.7–5.3)
SODIUM SERPL-SCNC: 139 MMOL/L (ref 136–145)
T4 FREE SERPL-MCNC: 1.7 NG/DL (ref 0.9–1.7)
TSH SERPL DL<=0.05 MIU/L-ACNC: <0.01 UIU/ML (ref 0.27–4.2)

## 2025-05-28 PROCEDURE — 80048 BASIC METABOLIC PNL TOTAL CA: CPT

## 2025-05-28 PROCEDURE — 36415 COLL VENOUS BLD VENIPUNCTURE: CPT

## 2025-05-28 PROCEDURE — 84439 ASSAY OF FREE THYROXINE: CPT

## 2025-05-28 PROCEDURE — 84443 ASSAY THYROID STIM HORMONE: CPT

## 2025-06-02 ENCOUNTER — OFFICE VISIT (OUTPATIENT)
Age: 45
End: 2025-06-02
Payer: MEDICARE

## 2025-06-02 VITALS
HEART RATE: 67 BPM | DIASTOLIC BLOOD PRESSURE: 66 MMHG | BODY MASS INDEX: 23.59 KG/M2 | WEIGHT: 178 LBS | HEIGHT: 73 IN | SYSTOLIC BLOOD PRESSURE: 106 MMHG

## 2025-06-02 DIAGNOSIS — C73 PAPILLARY THYROID CARCINOMA (HCC): ICD-10-CM

## 2025-06-02 DIAGNOSIS — E89.0 POSTOPERATIVE HYPOTHYROIDISM: Primary | ICD-10-CM

## 2025-06-02 PROCEDURE — G8420 CALC BMI NORM PARAMETERS: HCPCS | Performed by: INTERNAL MEDICINE

## 2025-06-02 PROCEDURE — 99213 OFFICE O/P EST LOW 20 MIN: CPT | Performed by: INTERNAL MEDICINE

## 2025-06-02 PROCEDURE — 4004F PT TOBACCO SCREEN RCVD TLK: CPT | Performed by: INTERNAL MEDICINE

## 2025-06-02 PROCEDURE — G8427 DOCREV CUR MEDS BY ELIG CLIN: HCPCS | Performed by: INTERNAL MEDICINE

## 2025-06-02 PROCEDURE — 99212 OFFICE O/P EST SF 10 MIN: CPT | Performed by: INTERNAL MEDICINE

## 2025-06-02 RX ORDER — PANTOPRAZOLE SODIUM 40 MG/1
40 TABLET, DELAYED RELEASE ORAL DAILY
COMMUNITY
Start: 2025-03-03

## 2025-06-02 RX ORDER — PROCHLORPERAZINE MALEATE 10 MG
TABLET ORAL
COMMUNITY
Start: 2025-01-23

## 2025-06-02 RX ORDER — MAGNESIUM OXIDE 400 MG/1
400 TABLET ORAL DAILY
COMMUNITY
Start: 2025-04-03

## 2025-06-02 RX ORDER — ONDANSETRON 8 MG/1
TABLET, FILM COATED ORAL
COMMUNITY
Start: 2025-01-23

## 2025-06-02 RX ORDER — LEVOTHYROXINE SODIUM 150 UG/1
TABLET ORAL
Qty: 90 TABLET | Refills: 5 | Status: SHIPPED | OUTPATIENT
Start: 2025-06-02

## 2025-06-02 ASSESSMENT — ENCOUNTER SYMPTOMS
VOICE CHANGE: 1
TROUBLE SWALLOWING: 1

## 2025-06-02 NOTE — PROGRESS NOTES
6/2/2025    Assessment:       Diagnosis Orders   1. Postoperative hypothyroidism  Basic Metabolic Panel    T4, Free    TSH reflex to FT4    levothyroxine (SYNTHROID) 150 MCG tablet    Thyroglobulin    Anti-Thyroglobulin Antibody      2. Papillary thyroid carcinoma (HCC)            PLAN:     Orders Placed This Encounter   Procedures    Basic Metabolic Panel     Standing Status:   Future     Expected Date:   6/2/2025     Expiration Date:   6/2/2026    T4, Free     Standing Status:   Future     Expected Date:   6/2/2025     Expiration Date:   6/2/2026    TSH reflex to FT4     Standing Status:   Future     Expected Date:   6/2/2025     Expiration Date:   6/2/2026    Thyroglobulin     Standing Status:   Future     Expected Date:   6/2/2025     Expiration Date:   6/2/2026    Anti-Thyroglobulin Antibody     Standing Status:   Future     Expected Date:   6/2/2025     Expiration Date:   6/2/2026     Orders Placed This Encounter   Medications    levothyroxine (SYNTHROID) 150 MCG tablet     Sig: TAKE 1 TABLET BY MOUTH DAILY     Dispense:  90 tablet     Refill:  5     Lower Synthroid to 150 mcg daily patient to follow-up in 3 to 6 months time  Subjective:     Chief Complaint   Patient presents with    Hypothyroidism     Papillary thyroid carcinoma     Vitals:    06/02/25 1514   BP: 106/66   Pulse: 67   Weight: 80.7 kg (178 lb)   Height: 1.854 m (6' 0.99\")     Wt Readings from Last 3 Encounters:   06/02/25 80.7 kg (178 lb)   10/28/24 102.2 kg (225 lb 3.2 oz)   10/21/24 102.1 kg (225 lb)     BP Readings from Last 3 Encounters:   06/02/25 106/66   10/28/24 108/72   10/21/24 112/73     Follow-up on hypothyroidism on levothyroxine 175 mcg daily history of papillary thyroid carcinoma thyroglobulin's have been stable last 1 was done in October of last year patient's TSH was suppressed free T4 elevated has been losing weight getting G-tube feeding history of laryngeal carcinoma recently    Thyroid Problem  Presents for follow-up visit.

## (undated) DEVICE — GLOVE ORANGE PI 8   MSG9080

## (undated) DEVICE — MASTISOL ADHESIVE LIQ 2/3ML

## (undated) DEVICE — Device

## (undated) DEVICE — 3M™ STERI-STRIP™ REINFORCED ADHESIVE SKIN CLOSURES, R1547, 1/2 IN X 4 IN (12 MM X 100 MM), 6 STRIPS/ENVELOPE: Brand: 3M™ STERI-STRIP™

## (undated) DEVICE — SYRINGE MED 5ML STD CLR PLAS LUERLOCK TIP N CTRL DISP

## (undated) DEVICE — DRAPE,REIN 53X77,STERILE: Brand: MEDLINE

## (undated) DEVICE — SURGICAL SUCTION CONNECTING TUBE WITH MALE CONNECTOR AND SUCTION CLAMP, 2 FT. LONG (.6 M), 5 MM I.D.: Brand: CONMED

## (undated) DEVICE — TOWEL,OR,DSP,ST,NATURAL,DLX,4/PK,20PK/CS: Brand: MEDLINE

## (undated) DEVICE — CORD ES L12FT BPLR FRCP

## (undated) DEVICE — POSITIONER HD W8XH4XL8.5IN RASPBERRY FOAM SLT

## (undated) DEVICE — GOWN,SIRUS,NONRNF,SETINSLV,XL,20/CS: Brand: MEDLINE

## (undated) DEVICE — MARKER SKIN GENTIAN VLT FN TIP W/ 6IN RUL L RESVR MED GRD

## (undated) DEVICE — COVER,MAYO STAND,STERILE: Brand: MEDLINE

## (undated) DEVICE — SUTURE PROL SZ 4-0 L30IN NONABSORBABLE BLU SH L26MM 1/2 CIR 8831H

## (undated) DEVICE — DRESSING TRNSPAR W5XL4.5IN FLM SHT SEMIPERMEABLE WIND

## (undated) DEVICE — SUTURE PERMAHAND SZ 3-0 L18IN NONABSORBABLE BLK SILK BRAID A184H

## (undated) DEVICE — SUTURE ETHLN SZ 3-0 L18IN NONABSORBABLE BLK FS-1 L24MM 3/8 663H

## (undated) DEVICE — DRAIN SURG L18IN DIA025IN 100% SIL RADPQ FOR CLS WND DRNAGE

## (undated) DEVICE — SPONGE,NEURO,0.5"X3",XR,STRL,LF,10/PK: Brand: MEDLINE

## (undated) DEVICE — TUBING, SUCTION, 9/32" X 20', STRAIGHT: Brand: MEDLINE INDUSTRIES, INC.

## (undated) DEVICE — GLOVE SURG SZ 85 L12IN FNGR ORTHO 126MIL CRM LTX FREE

## (undated) DEVICE — MARKER,SKIN,WI/RULER AND LABELS: Brand: MEDLINE

## (undated) DEVICE — KIT,ANTI FOG,W/SPONGE & FLUID,SOFT PACK: Brand: MEDLINE

## (undated) DEVICE — STRAP ARMBRD W1.5XL32IN FOAM STR YET SFT W/ HK AND LOOP

## (undated) DEVICE — SINGLE PORT MANIFOLD: Brand: NEPTUNE 2

## (undated) DEVICE — STRAP,POSITIONING,KNEE/BODY,FOAM,4X60": Brand: MEDLINE

## (undated) DEVICE — PAD,ABDOMINAL,8"X7.5",ST,LF,20/BX: Brand: MEDLINE INDUSTRIES, INC.

## (undated) DEVICE — MERCY HEALTH ST CHARLES: Brand: MEDLINE INDUSTRIES, INC.

## (undated) DEVICE — SOLUTION PREP PAINT POV IOD FOR SKIN MUCOUS MEM

## (undated) DEVICE — SYRINGE MED 10ML LUERLOCK TIP W/O SFTY DISP

## (undated) DEVICE — BLADE ES ELASTOMERIC COAT INSUL DURABLE BEND UPTO 90DEG

## (undated) DEVICE — GAUZE,SPONGE,4"X4",16PLY,XRAY,STRL,LF: Brand: MEDLINE

## (undated) DEVICE — NEEDLE HYPO 27GA L15IN REG BVL W O SFTY FOR SYR DISPOSABLE

## (undated) DEVICE — ZIMMER® STERILE DISPOSABLE TOURNIQUET CUFF WITH PLC, DUAL PORT, SINGLE BLADDER, 18 IN. (46 CM)

## (undated) DEVICE — SAW BLADE OSCILLATING

## (undated) DEVICE — PREMIUM DRY TRAY LF: Brand: MEDLINE INDUSTRIES, INC.